# Patient Record
Sex: MALE | Race: WHITE
[De-identification: names, ages, dates, MRNs, and addresses within clinical notes are randomized per-mention and may not be internally consistent; named-entity substitution may affect disease eponyms.]

---

## 2018-02-25 ENCOUNTER — HOSPITAL ENCOUNTER (INPATIENT)
Dept: HOSPITAL 100 - ED | Age: 68
LOS: 1 days | Discharge: HOME | DRG: 343 | End: 2018-02-26
Payer: MEDICARE

## 2018-02-25 VITALS — BODY MASS INDEX: 29.1 KG/M2 | BODY MASS INDEX: 28 KG/M2 | BODY MASS INDEX: 29.2 KG/M2

## 2018-02-25 VITALS
HEART RATE: 120 BPM | RESPIRATION RATE: 18 BRPM | OXYGEN SATURATION: 97 % | SYSTOLIC BLOOD PRESSURE: 138 MMHG | TEMPERATURE: 98.42 F | DIASTOLIC BLOOD PRESSURE: 96 MMHG

## 2018-02-25 DIAGNOSIS — E11.9: ICD-10-CM

## 2018-02-25 DIAGNOSIS — N40.0: ICD-10-CM

## 2018-02-25 DIAGNOSIS — Z79.84: ICD-10-CM

## 2018-02-25 DIAGNOSIS — K35.80: Primary | ICD-10-CM

## 2018-02-25 LAB
ALANINE AMINOTRANSFER ALT/SGPT: 25 U/L (ref 16–61)
ALBUMIN SERPL-MCNC: 3.6 G/DL (ref 3.2–5)
ALKALINE PHOSPHATASE: 69 U/L (ref 45–117)
ANION GAP: 8 (ref 5–15)
AST(SGOT): 14 U/L (ref 15–37)
BILIRUB DIRECT SERPL-MCNC: 0.1 MG/DL (ref 0–0.3)
BUN SERPL-MCNC: 21 MG/DL (ref 7–18)
BUN/CREAT RATIO: 10.7 RATIO (ref 10–20)
CALCIUM SERPL-MCNC: 8.7 MG/DL (ref 8.5–10.1)
CARBON DIOXIDE: 30 MMOL/L (ref 21–32)
CHLORIDE: 103 MMOL/L (ref 98–107)
DEPRECATED RDW RBC: 43 FL (ref 35.1–43.9)
DIFFERENTIAL INDICATED: (no result)
ERYTHROCYTE [DISTWIDTH] IN BLOOD: 13.1 % (ref 11.6–14.6)
EST GLOM FILT RATE - AFR AMER: 44 ML/MIN (ref 60–?)
ESTIMATED CREATININE CLEARANCE: 39.82 ML/MIN
GLOBULIN: 2.8 G/DL (ref 2.2–4.2)
GLUCOSE, DIPSTICK: 1000 MG/DL
GLUCOSE: 187 MG/DL (ref 74–106)
HCT VFR BLD AUTO: 46.5 % (ref 40–54)
HEMOGLOBIN: 16.4 G/DL (ref 13–16.5)
HGB BLD-MCNC: 16.4 G/DL (ref 13–16.5)
IMMATURE GRANULOCYTES COUNT: 0.02 X10^3/UL (ref 0–0)
LIPASE: 232 U/L (ref 73–393)
MCV RBC: 91.7 FL (ref 80–94)
MEAN CORP HGB CONC: 35.3 G/GL (ref 32–36)
MEAN PLATELET VOL.: 10.5 FL (ref 6.2–12)
MUCOUS THREADS URNS QL MICRO: (no result) /HPF
PLATELET # BLD: 148 K/MM3 (ref 150–450)
PLATELET COUNT: 148 K/MM3 (ref 150–450)
POSITIVE COUNT: NO
POSITIVE DIFFERENTIAL: NO
POSITIVE MORPHOLOGY: NO
POTASSIUM: 4 MMOL/L (ref 3.5–5.1)
RBC # BLD AUTO: 5.07 M/MM3 (ref 4.6–6.2)
RBC DISTRIBUTION WIDTH CV: 13.1 % (ref 11.6–14.6)
RBC DISTRIBUTION WIDTH SD: 43 FL (ref 35.1–43.9)
RBC UR QL: (no result) /HPF (ref 0–5)
SP GR UR: 1.01 (ref 1–1.03)
SQUAMOUS URNS QL MICRO: (no result) /HPF (ref 0–5)
URINE PRESERVATIVE: (no result)
WBC # BLD AUTO: 10.1 K/MM3 (ref 4.4–11)
WHITE BLOOD COUNT: 10.1 K/MM3 (ref 4.4–11)

## 2018-02-25 PROCEDURE — 99282 EMERGENCY DEPT VISIT SF MDM: CPT

## 2018-02-25 PROCEDURE — 74176 CT ABD & PELVIS W/O CONTRAST: CPT

## 2018-02-25 PROCEDURE — 97802 MEDICAL NUTRITION INDIV IN: CPT

## 2018-02-25 PROCEDURE — 88304 TISSUE EXAM BY PATHOLOGIST: CPT

## 2018-02-25 PROCEDURE — 81001 URINALYSIS AUTO W/SCOPE: CPT

## 2018-02-25 PROCEDURE — A4216 STERILE WATER/SALINE, 10 ML: HCPCS

## 2018-02-25 PROCEDURE — 80076 HEPATIC FUNCTION PANEL: CPT

## 2018-02-25 PROCEDURE — 82962 GLUCOSE BLOOD TEST: CPT

## 2018-02-25 PROCEDURE — 83690 ASSAY OF LIPASE: CPT

## 2018-02-25 PROCEDURE — 93005 ELECTROCARDIOGRAM TRACING: CPT

## 2018-02-25 PROCEDURE — 85025 COMPLETE CBC W/AUTO DIFF WBC: CPT

## 2018-02-25 PROCEDURE — 80048 BASIC METABOLIC PNL TOTAL CA: CPT

## 2018-02-25 RX ADMIN — SODIUM CHLORIDE 125 ML: 9 INJECTION, SOLUTION INTRAVENOUS at 22:01

## 2018-02-26 VITALS
HEART RATE: 92 BPM | SYSTOLIC BLOOD PRESSURE: 121 MMHG | RESPIRATION RATE: 16 BRPM | OXYGEN SATURATION: 94 % | DIASTOLIC BLOOD PRESSURE: 71 MMHG

## 2018-02-26 VITALS
RESPIRATION RATE: 16 BRPM | DIASTOLIC BLOOD PRESSURE: 66 MMHG | SYSTOLIC BLOOD PRESSURE: 106 MMHG | OXYGEN SATURATION: 94 % | TEMPERATURE: 98.6 F | HEART RATE: 108 BPM

## 2018-02-26 VITALS — OXYGEN SATURATION: 94 %

## 2018-02-26 VITALS
SYSTOLIC BLOOD PRESSURE: 104 MMHG | OXYGEN SATURATION: 94 % | RESPIRATION RATE: 16 BRPM | TEMPERATURE: 98.06 F | DIASTOLIC BLOOD PRESSURE: 62 MMHG | HEART RATE: 100 BPM

## 2018-02-26 VITALS
OXYGEN SATURATION: 99 % | HEART RATE: 104 BPM | TEMPERATURE: 97.52 F | RESPIRATION RATE: 16 BRPM | SYSTOLIC BLOOD PRESSURE: 121 MMHG | DIASTOLIC BLOOD PRESSURE: 71 MMHG

## 2018-02-26 VITALS
SYSTOLIC BLOOD PRESSURE: 121 MMHG | DIASTOLIC BLOOD PRESSURE: 67 MMHG | HEART RATE: 98 BPM | OXYGEN SATURATION: 96 % | RESPIRATION RATE: 16 BRPM

## 2018-02-26 VITALS
DIASTOLIC BLOOD PRESSURE: 75 MMHG | HEART RATE: 90 BPM | RESPIRATION RATE: 16 BRPM | SYSTOLIC BLOOD PRESSURE: 121 MMHG | OXYGEN SATURATION: 96 %

## 2018-02-26 VITALS
OXYGEN SATURATION: 96 % | TEMPERATURE: 98.24 F | DIASTOLIC BLOOD PRESSURE: 71 MMHG | RESPIRATION RATE: 14 BRPM | SYSTOLIC BLOOD PRESSURE: 121 MMHG | HEART RATE: 90 BPM

## 2018-02-26 VITALS
TEMPERATURE: 99.32 F | HEART RATE: 103 BPM | RESPIRATION RATE: 18 BRPM | OXYGEN SATURATION: 94 % | SYSTOLIC BLOOD PRESSURE: 107 MMHG | DIASTOLIC BLOOD PRESSURE: 68 MMHG

## 2018-02-26 VITALS
OXYGEN SATURATION: 94 % | HEART RATE: 91 BPM | DIASTOLIC BLOOD PRESSURE: 60 MMHG | RESPIRATION RATE: 12 BRPM | SYSTOLIC BLOOD PRESSURE: 107 MMHG | TEMPERATURE: 97.5 F

## 2018-02-26 VITALS
TEMPERATURE: 97.3 F | SYSTOLIC BLOOD PRESSURE: 120 MMHG | RESPIRATION RATE: 16 BRPM | HEART RATE: 91 BPM | OXYGEN SATURATION: 92 % | DIASTOLIC BLOOD PRESSURE: 69 MMHG

## 2018-02-26 VITALS — TEMPERATURE: 98 F | HEART RATE: 88 BPM | RESPIRATION RATE: 16 BRPM | OXYGEN SATURATION: 96 %

## 2018-02-26 VITALS — DIASTOLIC BLOOD PRESSURE: 71 MMHG | SYSTOLIC BLOOD PRESSURE: 121 MMHG

## 2018-02-26 VITALS
RESPIRATION RATE: 16 BRPM | OXYGEN SATURATION: 96 % | SYSTOLIC BLOOD PRESSURE: 121 MMHG | DIASTOLIC BLOOD PRESSURE: 71 MMHG | HEART RATE: 96 BPM

## 2018-02-26 PROCEDURE — 0DTJ4ZZ RESECTION OF APPENDIX, PERCUTANEOUS ENDOSCOPIC APPROACH: ICD-10-PCS | Performed by: SURGERY

## 2018-02-26 RX ADMIN — SODIUM CHLORIDE 125 ML: 9 INJECTION, SOLUTION INTRAVENOUS at 05:23

## 2018-02-26 RX ADMIN — BUPIVACAINE HYDROCHLORIDE AND EPINEPHRINE BITARTRATE 30 ML: 5; .005 INJECTION, SOLUTION EPIDURAL; INTRACAUDAL; PERINEURAL at 04:35

## 2024-09-09 ENCOUNTER — APPOINTMENT (OUTPATIENT)
Dept: CARDIOLOGY | Facility: HOSPITAL | Age: 74
DRG: 322 | End: 2024-09-09
Payer: MEDICARE

## 2024-09-09 ENCOUNTER — APPOINTMENT (OUTPATIENT)
Dept: RADIOLOGY | Facility: HOSPITAL | Age: 74
DRG: 322 | End: 2024-09-09
Payer: MEDICARE

## 2024-09-09 ENCOUNTER — HOSPITAL ENCOUNTER (INPATIENT)
Facility: HOSPITAL | Age: 74
LOS: 1 days | Discharge: HOME | DRG: 322 | End: 2024-09-11
Attending: EMERGENCY MEDICINE | Admitting: HOSPITALIST
Payer: MEDICARE

## 2024-09-09 DIAGNOSIS — I21.4 NON-ST ELEVATION (NSTEMI) MYOCARDIAL INFARCTION (MULTI): ICD-10-CM

## 2024-09-09 DIAGNOSIS — I25.10 CORONARY ARTERY DISEASE INVOLVING NATIVE CORONARY ARTERY OF NATIVE HEART, UNSPECIFIED WHETHER ANGINA PRESENT: ICD-10-CM

## 2024-09-09 DIAGNOSIS — R79.89 ELEVATED TROPONIN: ICD-10-CM

## 2024-09-09 DIAGNOSIS — R07.9 CHEST PAIN, UNSPECIFIED TYPE: Primary | ICD-10-CM

## 2024-09-09 DIAGNOSIS — R07.89 OTHER CHEST PAIN: ICD-10-CM

## 2024-09-09 DIAGNOSIS — I20.89 EXERTIONAL ANGINA (CMS-HCC): ICD-10-CM

## 2024-09-09 LAB
ALBUMIN SERPL BCP-MCNC: 4.1 G/DL (ref 3.4–5)
ALP SERPL-CCNC: 57 U/L (ref 33–136)
ALT SERPL W P-5'-P-CCNC: 17 U/L (ref 10–52)
ANION GAP SERPL CALC-SCNC: 10 MMOL/L (ref 10–20)
ANION GAP SERPL CALC-SCNC: 12 MMOL/L (ref 10–20)
APTT PPP: 31 SECONDS (ref 27–38)
AST SERPL W P-5'-P-CCNC: 17 U/L (ref 9–39)
BASOPHILS # BLD AUTO: 0.05 X10*3/UL (ref 0–0.1)
BASOPHILS NFR BLD AUTO: 0.7 %
BILIRUB SERPL-MCNC: 0.5 MG/DL (ref 0–1.2)
BNP SERPL-MCNC: 54 PG/ML (ref 0–99)
BUN SERPL-MCNC: 16 MG/DL (ref 6–23)
BUN SERPL-MCNC: 16 MG/DL (ref 6–23)
CALCIUM SERPL-MCNC: 9.2 MG/DL (ref 8.6–10.3)
CALCIUM SERPL-MCNC: 9.2 MG/DL (ref 8.6–10.3)
CARDIAC TROPONIN I PNL SERPL HS: 54 NG/L (ref 0–20)
CARDIAC TROPONIN I PNL SERPL HS: 65 NG/L (ref 0–20)
CHLORIDE SERPL-SCNC: 102 MMOL/L (ref 98–107)
CHLORIDE SERPL-SCNC: 106 MMOL/L (ref 98–107)
CHOLEST SERPL-MCNC: 149 MG/DL (ref 0–199)
CHOLESTEROL/HDL RATIO: 5
CO2 SERPL-SCNC: 26 MMOL/L (ref 21–32)
CO2 SERPL-SCNC: 26 MMOL/L (ref 21–32)
CREAT SERPL-MCNC: 1.09 MG/DL (ref 0.5–1.3)
CREAT SERPL-MCNC: 1.19 MG/DL (ref 0.5–1.3)
D DIMER PPP FEU-MCNC: 418 NG/ML FEU
EGFRCR SERPLBLD CKD-EPI 2021: 65 ML/MIN/1.73M*2
EGFRCR SERPLBLD CKD-EPI 2021: 72 ML/MIN/1.73M*2
EOSINOPHIL # BLD AUTO: 0.3 X10*3/UL (ref 0–0.4)
EOSINOPHIL NFR BLD AUTO: 4.4 %
ERYTHROCYTE [DISTWIDTH] IN BLOOD BY AUTOMATED COUNT: 12.1 % (ref 11.5–14.5)
GLUCOSE BLD MANUAL STRIP-MCNC: 226 MG/DL (ref 74–99)
GLUCOSE SERPL-MCNC: 194 MG/DL (ref 74–99)
GLUCOSE SERPL-MCNC: 258 MG/DL (ref 74–99)
HCT VFR BLD AUTO: 49.2 % (ref 41–52)
HDLC SERPL-MCNC: 30 MG/DL
HGB BLD-MCNC: 16.7 G/DL (ref 13.5–17.5)
HOLD SPECIMEN: NORMAL
IMM GRANULOCYTES # BLD AUTO: 0.01 X10*3/UL (ref 0–0.5)
IMM GRANULOCYTES NFR BLD AUTO: 0.1 % (ref 0–0.9)
INR PPP: 0.9 (ref 0.9–1.1)
LDLC SERPL CALC-MCNC: 59 MG/DL
LYMPHOCYTES # BLD AUTO: 2 X10*3/UL (ref 0.8–3)
LYMPHOCYTES NFR BLD AUTO: 29.2 %
MAGNESIUM SERPL-MCNC: 1.93 MG/DL (ref 1.6–2.4)
MCH RBC QN AUTO: 31.3 PG (ref 26–34)
MCHC RBC AUTO-ENTMCNC: 33.9 G/DL (ref 32–36)
MCV RBC AUTO: 92 FL (ref 80–100)
MONOCYTES # BLD AUTO: 0.5 X10*3/UL (ref 0.05–0.8)
MONOCYTES NFR BLD AUTO: 7.3 %
NEUTROPHILS # BLD AUTO: 3.98 X10*3/UL (ref 1.6–5.5)
NEUTROPHILS NFR BLD AUTO: 58.3 %
NON HDL CHOLESTEROL: 119 MG/DL (ref 0–149)
NRBC BLD-RTO: 0 /100 WBCS (ref 0–0)
PLATELET # BLD AUTO: 190 X10*3/UL (ref 150–450)
POTASSIUM SERPL-SCNC: 4.4 MMOL/L (ref 3.5–5.3)
POTASSIUM SERPL-SCNC: 4.6 MMOL/L (ref 3.5–5.3)
PROT SERPL-MCNC: 6.1 G/DL (ref 6.4–8.2)
PROTHROMBIN TIME: 10.7 SECONDS (ref 9.8–12.8)
RBC # BLD AUTO: 5.33 X10*6/UL (ref 4.5–5.9)
SODIUM SERPL-SCNC: 135 MMOL/L (ref 136–145)
SODIUM SERPL-SCNC: 138 MMOL/L (ref 136–145)
T4 FREE SERPL-MCNC: 1.41 NG/DL (ref 0.61–1.12)
TRIGL SERPL-MCNC: 301 MG/DL (ref 0–149)
TSH SERPL-ACNC: 0.22 MIU/L (ref 0.44–3.98)
VLDL: 60 MG/DL (ref 0–40)
WBC # BLD AUTO: 6.8 X10*3/UL (ref 4.4–11.3)

## 2024-09-09 PROCEDURE — 84443 ASSAY THYROID STIM HORMONE: CPT | Performed by: NURSE PRACTITIONER

## 2024-09-09 PROCEDURE — 85730 THROMBOPLASTIN TIME PARTIAL: CPT | Performed by: PHYSICIAN ASSISTANT

## 2024-09-09 PROCEDURE — 93005 ELECTROCARDIOGRAM TRACING: CPT

## 2024-09-09 PROCEDURE — G0378 HOSPITAL OBSERVATION PER HR: HCPCS

## 2024-09-09 PROCEDURE — 82947 ASSAY GLUCOSE BLOOD QUANT: CPT

## 2024-09-09 PROCEDURE — 2500000001 HC RX 250 WO HCPCS SELF ADMINISTERED DRUGS (ALT 637 FOR MEDICARE OP): Performed by: PHYSICIAN ASSISTANT

## 2024-09-09 PROCEDURE — 84484 ASSAY OF TROPONIN QUANT: CPT | Performed by: EMERGENCY MEDICINE

## 2024-09-09 PROCEDURE — 36415 COLL VENOUS BLD VENIPUNCTURE: CPT | Performed by: NURSE PRACTITIONER

## 2024-09-09 PROCEDURE — 2500000004 HC RX 250 GENERAL PHARMACY W/ HCPCS (ALT 636 FOR OP/ED): Performed by: NURSE PRACTITIONER

## 2024-09-09 PROCEDURE — 99285 EMERGENCY DEPT VISIT HI MDM: CPT

## 2024-09-09 PROCEDURE — 83880 ASSAY OF NATRIURETIC PEPTIDE: CPT | Performed by: PHYSICIAN ASSISTANT

## 2024-09-09 PROCEDURE — 84439 ASSAY OF FREE THYROXINE: CPT | Performed by: NURSE PRACTITIONER

## 2024-09-09 PROCEDURE — 71045 X-RAY EXAM CHEST 1 VIEW: CPT | Performed by: RADIOLOGY

## 2024-09-09 PROCEDURE — 2500000002 HC RX 250 W HCPCS SELF ADMINISTERED DRUGS (ALT 637 FOR MEDICARE OP, ALT 636 FOR OP/ED): Performed by: INTERNAL MEDICINE

## 2024-09-09 PROCEDURE — 80061 LIPID PANEL: CPT | Performed by: NURSE PRACTITIONER

## 2024-09-09 PROCEDURE — 2500000001 HC RX 250 WO HCPCS SELF ADMINISTERED DRUGS (ALT 637 FOR MEDICARE OP): Performed by: NURSE PRACTITIONER

## 2024-09-09 PROCEDURE — 99291 CRITICAL CARE FIRST HOUR: CPT | Performed by: STUDENT IN AN ORGANIZED HEALTH CARE EDUCATION/TRAINING PROGRAM

## 2024-09-09 PROCEDURE — 2500000001 HC RX 250 WO HCPCS SELF ADMINISTERED DRUGS (ALT 637 FOR MEDICARE OP): Performed by: STUDENT IN AN ORGANIZED HEALTH CARE EDUCATION/TRAINING PROGRAM

## 2024-09-09 PROCEDURE — 83735 ASSAY OF MAGNESIUM: CPT | Performed by: PHYSICIAN ASSISTANT

## 2024-09-09 PROCEDURE — 85025 COMPLETE CBC W/AUTO DIFF WBC: CPT | Performed by: EMERGENCY MEDICINE

## 2024-09-09 PROCEDURE — 85610 PROTHROMBIN TIME: CPT | Performed by: PHYSICIAN ASSISTANT

## 2024-09-09 PROCEDURE — 36415 COLL VENOUS BLD VENIPUNCTURE: CPT | Performed by: EMERGENCY MEDICINE

## 2024-09-09 PROCEDURE — 71045 X-RAY EXAM CHEST 1 VIEW: CPT

## 2024-09-09 PROCEDURE — 96372 THER/PROPH/DIAG INJ SC/IM: CPT | Performed by: NURSE PRACTITIONER

## 2024-09-09 PROCEDURE — 85379 FIBRIN DEGRADATION QUANT: CPT | Performed by: PHYSICIAN ASSISTANT

## 2024-09-09 PROCEDURE — 84075 ASSAY ALKALINE PHOSPHATASE: CPT | Performed by: EMERGENCY MEDICINE

## 2024-09-09 PROCEDURE — 82374 ASSAY BLOOD CARBON DIOXIDE: CPT | Performed by: NURSE PRACTITIONER

## 2024-09-09 PROCEDURE — 2500000002 HC RX 250 W HCPCS SELF ADMINISTERED DRUGS (ALT 637 FOR MEDICARE OP, ALT 636 FOR OP/ED): Performed by: NURSE PRACTITIONER

## 2024-09-09 RX ORDER — ACETAMINOPHEN 325 MG/1
650 TABLET ORAL EVERY 4 HOURS PRN
Status: DISCONTINUED | OUTPATIENT
Start: 2024-09-09 | End: 2024-09-11 | Stop reason: HOSPADM

## 2024-09-09 RX ORDER — GLIPIZIDE 5 MG/1
5 TABLET ORAL
Status: DISCONTINUED | OUTPATIENT
Start: 2024-09-10 | End: 2024-09-11 | Stop reason: HOSPADM

## 2024-09-09 RX ORDER — DEXTROSE 50 % IN WATER (D50W) INTRAVENOUS SYRINGE
12.5
Status: DISCONTINUED | OUTPATIENT
Start: 2024-09-09 | End: 2024-09-11 | Stop reason: HOSPADM

## 2024-09-09 RX ORDER — DEXTROSE 50 % IN WATER (D50W) INTRAVENOUS SYRINGE
25
Status: DISCONTINUED | OUTPATIENT
Start: 2024-09-09 | End: 2024-09-11 | Stop reason: HOSPADM

## 2024-09-09 RX ORDER — MULTIVIT WITH IRON,MINERALS
1 TABLET,CHEWABLE ORAL EVERY MORNING
Status: ON HOLD | COMMUNITY

## 2024-09-09 RX ORDER — LEVOTHYROXINE SODIUM 112 UG/1
112 TABLET ORAL DAILY
Status: ON HOLD | COMMUNITY

## 2024-09-09 RX ORDER — HEPARIN SODIUM 10000 [USP'U]/100ML
0-4000 INJECTION, SOLUTION INTRAVENOUS CONTINUOUS
Status: DISCONTINUED | OUTPATIENT
Start: 2024-09-09 | End: 2024-09-09

## 2024-09-09 RX ORDER — LEVOTHYROXINE SODIUM 112 UG/1
112 TABLET ORAL DAILY
Status: DISCONTINUED | OUTPATIENT
Start: 2024-09-10 | End: 2024-09-11 | Stop reason: HOSPADM

## 2024-09-09 RX ORDER — TRIAMCINOLONE ACETONIDE 1 MG/G
CREAM TOPICAL DAILY PRN
Status: ON HOLD | COMMUNITY

## 2024-09-09 RX ORDER — ONDANSETRON 4 MG/1
4 TABLET, ORALLY DISINTEGRATING ORAL EVERY 8 HOURS PRN
Status: DISCONTINUED | OUTPATIENT
Start: 2024-09-09 | End: 2024-09-11 | Stop reason: HOSPADM

## 2024-09-09 RX ORDER — INSULIN LISPRO 100 [IU]/ML
0-10 INJECTION, SOLUTION INTRAVENOUS; SUBCUTANEOUS
Status: DISCONTINUED | OUTPATIENT
Start: 2024-09-09 | End: 2024-09-11 | Stop reason: HOSPADM

## 2024-09-09 RX ORDER — SITAGLIPTIN AND METFORMIN HYDROCHLORIDE 1000; 50 MG/1; MG/1
1 TABLET, FILM COATED ORAL
Status: ON HOLD | COMMUNITY

## 2024-09-09 RX ORDER — TAMSULOSIN HYDROCHLORIDE 0.4 MG/1
0.4 CAPSULE ORAL NIGHTLY
Status: DISCONTINUED | OUTPATIENT
Start: 2024-09-09 | End: 2024-09-11 | Stop reason: HOSPADM

## 2024-09-09 RX ORDER — SIMVASTATIN 20 MG/1
20 TABLET, FILM COATED ORAL NIGHTLY
COMMUNITY
End: 2024-09-11 | Stop reason: HOSPADM

## 2024-09-09 RX ORDER — PANTOPRAZOLE SODIUM 40 MG/1
40 TABLET, DELAYED RELEASE ORAL
Status: DISCONTINUED | OUTPATIENT
Start: 2024-09-10 | End: 2024-09-11 | Stop reason: HOSPADM

## 2024-09-09 RX ORDER — PHENYLEPHRINE HCL 10 MG
1000 TABLET ORAL 2 TIMES DAILY
Status: ON HOLD | COMMUNITY

## 2024-09-09 RX ORDER — NAPROXEN SODIUM 220 MG/1
1 TABLET ORAL DAILY
Status: ON HOLD | COMMUNITY

## 2024-09-09 RX ORDER — NAPROXEN SODIUM 220 MG/1
324 TABLET, FILM COATED ORAL ONCE
Status: COMPLETED | OUTPATIENT
Start: 2024-09-09 | End: 2024-09-09

## 2024-09-09 RX ORDER — ASPIRIN 81 MG/1
81 TABLET ORAL DAILY
Status: DISCONTINUED | OUTPATIENT
Start: 2024-09-10 | End: 2024-09-11 | Stop reason: HOSPADM

## 2024-09-09 RX ORDER — ONDANSETRON HYDROCHLORIDE 2 MG/ML
4 INJECTION, SOLUTION INTRAVENOUS EVERY 8 HOURS PRN
Status: DISCONTINUED | OUTPATIENT
Start: 2024-09-09 | End: 2024-09-11 | Stop reason: HOSPADM

## 2024-09-09 RX ORDER — ENOXAPARIN SODIUM 100 MG/ML
40 INJECTION SUBCUTANEOUS EVERY 24 HOURS
Status: DISCONTINUED | OUTPATIENT
Start: 2024-09-09 | End: 2024-09-11 | Stop reason: HOSPADM

## 2024-09-09 RX ORDER — LISINOPRIL 5 MG/1
5 TABLET ORAL DAILY
Status: ON HOLD | COMMUNITY

## 2024-09-09 RX ORDER — BISMUTH SUBSALICYLATE 262 MG
1 TABLET,CHEWABLE ORAL DAILY
Status: ON HOLD | COMMUNITY

## 2024-09-09 RX ORDER — MULTIVITAMIN/IRON/FOLIC ACID 18MG-0.4MG
1 TABLET ORAL DAILY
Status: ON HOLD | COMMUNITY

## 2024-09-09 RX ORDER — METOPROLOL SUCCINATE 25 MG/1
25 TABLET, EXTENDED RELEASE ORAL DAILY
Status: DISCONTINUED | OUTPATIENT
Start: 2024-09-09 | End: 2024-09-11 | Stop reason: HOSPADM

## 2024-09-09 RX ORDER — POLYETHYLENE GLYCOL 3350 17 G/17G
17 POWDER, FOR SOLUTION ORAL DAILY
Status: DISCONTINUED | OUTPATIENT
Start: 2024-09-09 | End: 2024-09-11 | Stop reason: HOSPADM

## 2024-09-09 RX ORDER — TAMSULOSIN HYDROCHLORIDE 0.4 MG/1
0.4 CAPSULE ORAL DAILY
Status: ON HOLD | COMMUNITY

## 2024-09-09 RX ORDER — TESTOSTERONE CYPIONATE 200 MG/ML
200 INJECTION, SOLUTION INTRAMUSCULAR
Status: ON HOLD | COMMUNITY

## 2024-09-09 RX ORDER — ATORVASTATIN CALCIUM 40 MG/1
40 TABLET, FILM COATED ORAL NIGHTLY
Status: DISCONTINUED | OUTPATIENT
Start: 2024-09-09 | End: 2024-09-11 | Stop reason: HOSPADM

## 2024-09-09 RX ORDER — OMEPRAZOLE 40 MG/1
40 CAPSULE, DELAYED RELEASE ORAL
Status: ON HOLD | COMMUNITY

## 2024-09-09 RX ORDER — GLIPIZIDE 5 MG/1
5 TABLET ORAL
Status: ON HOLD | COMMUNITY

## 2024-09-09 RX ORDER — LISINOPRIL 5 MG/1
5 TABLET ORAL NIGHTLY
Status: DISCONTINUED | OUTPATIENT
Start: 2024-09-09 | End: 2024-09-11 | Stop reason: HOSPADM

## 2024-09-09 RX ORDER — ASPIRIN 81 MG/1
81 TABLET ORAL DAILY
Status: ON HOLD | COMMUNITY

## 2024-09-09 RX ORDER — GLUCOSAMINE/CHONDR SU A SOD 167-133 MG
500 CAPSULE ORAL DAILY
COMMUNITY
End: 2024-09-11 | Stop reason: HOSPADM

## 2024-09-09 RX ORDER — SIMVASTATIN 20 MG/1
20 TABLET, FILM COATED ORAL NIGHTLY
Status: DISCONTINUED | OUTPATIENT
Start: 2024-09-09 | End: 2024-09-09

## 2024-09-09 RX ORDER — IBUPROFEN 100 MG/5ML
1000 SUSPENSION, ORAL (FINAL DOSE FORM) ORAL DAILY
Status: ON HOLD | COMMUNITY

## 2024-09-09 ASSESSMENT — COGNITIVE AND FUNCTIONAL STATUS - GENERAL
DAILY ACTIVITIY SCORE: 24
CLIMB 3 TO 5 STEPS WITH RAILING: A LITTLE
MOBILITY SCORE: 23
PATIENT BASELINE BEDBOUND: NO

## 2024-09-09 ASSESSMENT — HEART SCORE
ECG: NORMAL
HEART SCORE: 6
AGE: 65+
RISK FACTORS: >2 RISK FACTORS OR HX OF ATHEROSCLEROTIC DISEASE
TROPONIN: 1-3 TIMES NORMAL LIMIT
HISTORY: MODERATELY SUSPICIOUS

## 2024-09-09 ASSESSMENT — ENCOUNTER SYMPTOMS
BACK PAIN: 0
ABDOMINAL PAIN: 0
FEVER: 0
SEIZURES: 0
PALPITATIONS: 0
ARTHRALGIAS: 0
VOMITING: 0
HEMATURIA: 0
COLOR CHANGE: 0
EYE PAIN: 0
SORE THROAT: 0
SHORTNESS OF BREATH: 0
CHILLS: 0
COUGH: 0
DYSURIA: 0

## 2024-09-09 ASSESSMENT — ACTIVITIES OF DAILY LIVING (ADL)
JUDGMENT_ADEQUATE_SAFELY_COMPLETE_DAILY_ACTIVITIES: YES
HEARING - RIGHT EAR: HEARING AID
HEARING - LEFT EAR: HEARING AID
ADEQUATE_TO_COMPLETE_ADL: YES
BATHING: INDEPENDENT
WALKS IN HOME: INDEPENDENT
LACK_OF_TRANSPORTATION: NO
FEEDING YOURSELF: INDEPENDENT
GROOMING: INDEPENDENT
PATIENT'S MEMORY ADEQUATE TO SAFELY COMPLETE DAILY ACTIVITIES?: YES
DRESSING YOURSELF: INDEPENDENT
TOILETING: INDEPENDENT

## 2024-09-09 ASSESSMENT — COLUMBIA-SUICIDE SEVERITY RATING SCALE - C-SSRS
6. HAVE YOU EVER DONE ANYTHING, STARTED TO DO ANYTHING, OR PREPARED TO DO ANYTHING TO END YOUR LIFE?: NO
1. IN THE PAST MONTH, HAVE YOU WISHED YOU WERE DEAD OR WISHED YOU COULD GO TO SLEEP AND NOT WAKE UP?: NO
2. HAVE YOU ACTUALLY HAD ANY THOUGHTS OF KILLING YOURSELF?: NO

## 2024-09-09 ASSESSMENT — PAIN SCALES - GENERAL
PAINLEVEL_OUTOF10: 0 - NO PAIN
PAINLEVEL_OUTOF10: 3
PAINLEVEL_OUTOF10: 0 - NO PAIN
PAINLEVEL_OUTOF10: 3

## 2024-09-09 ASSESSMENT — PAIN - FUNCTIONAL ASSESSMENT
PAIN_FUNCTIONAL_ASSESSMENT: 0-10

## 2024-09-09 ASSESSMENT — PAIN DESCRIPTION - LOCATION: LOCATION: CHEST

## 2024-09-09 ASSESSMENT — PAIN DESCRIPTION - ORIENTATION: ORIENTATION: LEFT

## 2024-09-09 ASSESSMENT — PAIN DESCRIPTION - DESCRIPTORS: DESCRIPTORS: TIGHTNESS

## 2024-09-09 NOTE — ED PROVIDER NOTES
Patient is a 74-year-old male who presents to the emergency room with a chief complaint of chest pain.  Patient states that he has had chest pain mainly to his left side for the past week.  He states that this chest pain is present with exertion.  He states that the chest pain was specifically bad on Friday although still comes and goes when he is exerting himself.  He states that when he was walking from the parking lot into the hospital he developed chest pain that went away with rest.  At times he has associated shortness of breath.  He denies any fever or chills.  No cough.  He does have a history of hypertension, hypercholesterolemia, diabetes mellitus, and a family history of coronary artery disease.  Patient states that he did have a stress test approximately 5 years ago.  He denies any history of heart stents or heart attacks.           Review of Systems   Constitutional:  Negative for chills and fever.   HENT:  Negative for ear pain and sore throat.    Eyes:  Negative for pain and visual disturbance.   Respiratory:  Negative for cough and shortness of breath.    Cardiovascular:  Positive for chest pain. Negative for palpitations.   Gastrointestinal:  Negative for abdominal pain and vomiting.   Genitourinary:  Negative for dysuria and hematuria.   Musculoskeletal:  Negative for arthralgias and back pain.   Skin:  Negative for color change and rash.   Neurological:  Negative for seizures and syncope.   All other systems reviewed and are negative.       Physical Exam  Vitals and nursing note reviewed.   Constitutional:       General: He is not in acute distress.     Appearance: He is well-developed.   HENT:      Head: Normocephalic and atraumatic.   Eyes:      Conjunctiva/sclera: Conjunctivae normal.   Cardiovascular:      Rate and Rhythm: Normal rate and regular rhythm.      Heart sounds: No murmur heard.  Pulmonary:      Effort: Pulmonary effort is normal. No respiratory distress.      Breath sounds: Normal  breath sounds. No decreased breath sounds, wheezing, rhonchi or rales.   Abdominal:      Palpations: Abdomen is soft.      Tenderness: There is no abdominal tenderness.   Musculoskeletal:         General: No swelling. Normal range of motion.      Cervical back: Normal range of motion and neck supple.      Right lower leg: No edema.      Left lower leg: No edema.   Skin:     General: Skin is warm and dry.      Capillary Refill: Capillary refill takes less than 2 seconds.   Neurological:      General: No focal deficit present.      Mental Status: He is alert.   Psychiatric:         Mood and Affect: Mood normal.          Labs Reviewed   COMPREHENSIVE METABOLIC PANEL - Abnormal       Result Value    Glucose 258 (*)     Sodium 135 (*)     Potassium 4.6      Chloride 102      Bicarbonate 26      Anion Gap 12      Urea Nitrogen 16      Creatinine 1.19      eGFR 65      Calcium 9.2      Albumin 4.1      Alkaline Phosphatase 57      Total Protein 6.1 (*)     AST 17      Bilirubin, Total 0.5      ALT 17     SERIAL TROPONIN-INITIAL - Abnormal    Troponin I, High Sensitivity 65 (*)     Narrative:     Less than 99th percentile of normal range cutoff-  Female and children under 18 years old <14 ng/L; Male <21 ng/L: Negative  Repeat testing should be performed if clinically indicated.     Female and children under 18 years old 14-50 ng/L; Male 21-50 ng/L:  Consistent with possible cardiac damage and possible increased clinical   risk. Serial measurements may help to assess extent of myocardial damage.     >50 ng/L: Consistent with cardiac damage, increased clinical risk and  myocardial infarction. Serial measurements may help assess extent of   myocardial damage.      NOTE: Children less than 1 year old may have higher baseline troponin   levels and results should be interpreted in conjunction with the overall   clinical context.     NOTE: Troponin I testing is performed using a different   testing methodology at [facility] than at other   [facility] hospitals. Direct result comparisons should only   be made within the same method.   SERIAL TROPONIN, 1 HOUR - Abnormal    Troponin I, High Sensitivity 54 (*)     Narrative:     Less than 99th percentile of normal range cutoff-  Female and children under 18 years old <14 ng/L; Male <21 ng/L: Negative  Repeat testing should be performed if clinically indicated.     Female and children under 18 years old 14-50 ng/L; Male 21-50 ng/L:  Consistent with possible cardiac damage and possible increased clinical   risk. Serial measurements may help to assess extent of myocardial damage.     >50 ng/L: Consistent with cardiac damage, increased clinical risk and  myocardial infarction. Serial measurements may help assess extent of   myocardial damage.      NOTE: Children less than 1 year old may have higher baseline troponin   levels and results should be interpreted in conjunction with the overall   clinical context.     NOTE: Troponin I testing is performed using a different   testing methodology at [facility] than at other   [facility]. Direct result comparisons should only   be made within the same method.   MAGNESIUM - Normal    Magnesium 1.93     APTT - Normal    aPTT 31      Narrative:     The APTT is no longer used for monitoring Unfractionated Heparin Therapy. For monitoring Heparin Therapy, use the Heparin Assay.   PROTIME-INR - Normal    Protime 10.7      INR 0.9     D-DIMER, VTE EXCLUSION - Normal    D-Dimer, Quantitative VTE Exclusion 418      Narrative:     The VTE Exclusion D-Dimer assay is reported in ng/mL Fibrinogen Equivalent Units (FEU).    Per 's instructions for use, a value of less than 500 ng/mL (FEU) may help to exclude DVT or PE in outpatients when the assay is used with a clinical pretest probability assessment.(AEMR must utilize and document eCalc 'Wells Score Deep Vein Thrombosis Risk' for DVT exclusion only. Emergency Department should  utilize  Guidelines for Emergency Department Use of the VTE Exclusion D-Dimer and Clinical Pretest probability assessment model for DVT or PE exclusion.)   B-TYPE NATRIURETIC PEPTIDE - Normal    BNP 54      Narrative:        <100 pg/mL - Heart failure unlikely  100-299 pg/mL - Intermediate probability of acute heart                  failure exacerbation. Correlate with clinical                  context and patient history.    >=300 pg/mL - Heart Failure likely. Correlate with clinical                  context and patient history.    BNP testing is performed using different testing methodology at [facility] than at other [facility]. Direct result comparisons should only be made within the same method.      CBC WITH AUTO DIFFERENTIAL    WBC 6.8      nRBC 0.0      RBC 5.33      Hemoglobin 16.7      Hematocrit 49.2      MCV 92      MCH 31.3      MCHC 33.9      RDW 12.1      Platelets 190      Neutrophils % 58.3      Immature Granulocytes %, Automated 0.1      Lymphocytes % 29.2      Monocytes % 7.3      Eosinophils % 4.4      Basophils % 0.7      Neutrophils Absolute 3.98      Immature Granulocytes Absolute, Automated 0.01      Lymphocytes Absolute 2.00      Monocytes Absolute 0.50      Eosinophils Absolute 0.30      Basophils Absolute 0.05     TROPONIN SERIES- (INITIAL, 1 HR)    Narrative:     The following orders were created for panel order Troponin Series, (0, 1 HR).  Procedure                               Abnormality         Status                     ---------                               -----------         ------                     Troponin I, High Sensiti...[[id number]]  Abnormal            Final result               Troponin, High Sensitivi...[[id number]]  Abnormal            Final result                 Please view results for these tests on the individual orders.        XR chest 1 view   Final Result   1.  No evidence of acute cardiopulmonary process.                  MACRO:   None         Signed by: Sunita Akinsdylan 9/9/2024 11:59 AM   Dictation workstation:   [id number]           ECG 12 lead    Performed by: Leana Rodriguez PA-C  Authorized by: Leana Rodriguez PA-C    ECG interpreted by ED Physician in the absence of a cardiologist: yes    Comments:      EKG shows sinus tachycardia with a rate of 104 bpm.  No ST elevation.  Incomplete right bundle branch block.  SD interval is 162 ms and QRS duration is 108 ms.EKG interpretation per myself, Leana Rodriguez       Medical Decision Making  HEART SCORE 6    Patient is a 74-year-old male, Gael Norris hx of HTN, Hyperlipidemia, DM, and family history who presents with chest pain for 1 week.  His pain is present with exertion and is alleviated with rest. Was essentially pain free when he arrived to the ED but states that he had pain while walking in that has since resolved. Initial troponin was 65, repeat 54. Labwork otherwise unremarkable. EKG showed sinus tachycardia. He was given aspirin and nitroglycerin. Disucssed with cardiologist, Dr. Dixon who will perform cardiac catheterization tomorrow. Did not recommend heparin at this time. Patient seen and evaluated by myself along with attending physician, Dr. Kaye    Amount and/or Complexity of Data Reviewed  Labs: ordered. Decision-making details documented in ED Course.  Radiology: ordered. Decision-making details documented in ED Course.     Details: 1 view chest x-rays shows no acute process    Risk  Decision regarding hospitalization.         Diagnoses as of 09/09/24 1331   Chest pain, unspecified type   Exertional angina (CMS-HCC)   Elevated troponin                    Leana Rodriguez PA-C  09/09/24 1331

## 2024-09-09 NOTE — ASSESSMENT & PLAN NOTE
-Telemetry  -Cardiac diet n.p.o. at midnight  -Cardiology consulted and cath scheduled for tomorrow  -CBC BMP in the morning    Chronic conditions    Diabetes mellitus  -Cardiac diet controlled diet  -A1c 7.4  -Sliding scale insulin Accu-Cheks    Hyperlipidemia  -Continue aspirin and statin    Hypertension  -Currently continue home meds    For thyroidism  -Sinew levothyroxine    DVT prophylaxis continue Lovenox SCDs frequent ambulation    Position Home 24 to 48 hours when medically stable

## 2024-09-09 NOTE — Clinical Note
Angioplasty of the middle left anterior descending lesion. Inflation 1: Pressure = 15 susan; Duration = 15 sec. Inflation 2: Pressure = 15 susan; Duration = 15 sec. Removed after inflations

## 2024-09-09 NOTE — Clinical Note
Angioplasty of the left anterior descending diagonal lesion. Inflation 1: Pressure = 8 susan; Duration = 10 sec. Inflation 2: Pressure = 8 susan; Duration = 8 sec. Removed after inflations

## 2024-09-09 NOTE — H&P
History Of Present Illness  Gael Burnette is a 74 y.o. male past medical history of diabetes mellitus, hypertension, hyperlipidemia hypothyroidism hypergonadism presented to the emergency department with chief complaint of chest pain.Initial troponin was 65, repeat 54. Labwork otherwise unremarkable. EKG showed sinus tachycardia. He was given aspirin and nitroglycerin.  Case was discussed with Dr. Cooper who will perform cardiac catheterization in the morning.    Patient currently chest pain-free patient's daughter who is an NP is at bedside.  Patient states that he has had chest pain mainly to his left side for the past week. He states that this chest pain is present with exertion.  No associated symptoms.     Past Medical History  No past medical history on file.    Surgical History  No past surgical history on file.     Social History  He has no history on file for tobacco use, alcohol use, and drug use.    Family History  No family history on file.     Allergies  Patient has no known allergies.    Review of Systems   Constitutional:  Negative for chills and fever.   HENT:  Negative for ear pain and sore throat.    Eyes:  Negative for pain and visual disturbance.   Respiratory:  Negative for cough and shortness of breath.    Cardiovascular:  Positive for chest pain. Negative for palpitations.   Gastrointestinal:  Negative for abdominal pain and vomiting.   Genitourinary:  Negative for dysuria and hematuria.   Musculoskeletal:  Negative for arthralgias and back pain.   Skin:  Negative for color change and rash.   Neurological:  Negative for seizures and syncope.   All other systems reviewed and are negative.  Physical Exam   Constitutional:       General: He is not in acute distress.     Appearance: He is well-developed.   HENT:      Head: Normocephalic and atraumatic.   Eyes:      Conjunctiva/sclera: Conjunctivae normal.   Cardiovascular:      Rate and Rhythm: Normal rate and regular rhythm.      Heart sounds:  "No murmur heard.  Pulmonary:      Effort: Pulmonary effort is normal. No respiratory distress.      Breath sounds: Normal breath sounds. No decreased breath sounds, wheezing, rhonchi or rales.   Abdominal:      Palpations: Abdomen is soft.      Tenderness: There is no abdominal tenderness.   Musculoskeletal:         General: No swelling. Normal range of motion.      Cervical back: Normal range of motion and neck supple.      Right lower leg: No edema.      Left lower leg: No edema.   Skin:     General: Skin is warm and dry.      Capillary Refill: Capillary refill takes less than 2 seconds.   Neurological:      General: No focal deficit present.      Mental Status: He is alert.   Psychiatric:         Mood and Affect: Mood normal.  Last Recorded Vitals  Blood pressure 113/68, pulse 86, temperature 36.4 °C (97.6 °F), resp. rate 17, height 1.803 m (5' 11\"), weight 88.5 kg (195 lb), SpO2 96%.    Relevant Results      Results for orders placed or performed during the hospital encounter of 09/09/24 (from the past 24 hour(s))   Light Blue Top   Result Value Ref Range    Extra Tube Hold for add-ons.    aPTT   Result Value Ref Range    aPTT 31 27 - 38 seconds   Protime-INR   Result Value Ref Range    Protime 10.7 9.8 - 12.8 seconds    INR 0.9 0.9 - 1.1   D-Dimer, VTE Exclusion   Result Value Ref Range    D-Dimer, Quantitative VTE Exclusion 418 <=500 ng/mL FEU   Comprehensive metabolic panel   Result Value Ref Range    Glucose 258 (H) 74 - 99 mg/dL    Sodium 135 (L) 136 - 145 mmol/L    Potassium 4.6 3.5 - 5.3 mmol/L    Chloride 102 98 - 107 mmol/L    Bicarbonate 26 21 - 32 mmol/L    Anion Gap 12 10 - 20 mmol/L    Urea Nitrogen 16 6 - 23 mg/dL    Creatinine 1.19 0.50 - 1.30 mg/dL    eGFR 65 >60 mL/min/1.73m*2    Calcium 9.2 8.6 - 10.3 mg/dL    Albumin 4.1 3.4 - 5.0 g/dL    Alkaline Phosphatase 57 33 - 136 U/L    Total Protein 6.1 (L) 6.4 - 8.2 g/dL    AST 17 9 - 39 U/L    Bilirubin, Total 0.5 0.0 - 1.2 mg/dL    ALT 17 10 - 52 " U/L   CBC and Auto Differential   Result Value Ref Range    WBC 6.8 4.4 - 11.3 x10*3/uL    nRBC 0.0 0.0 - 0.0 /100 WBCs    RBC 5.33 4.50 - 5.90 x10*6/uL    Hemoglobin 16.7 13.5 - 17.5 g/dL    Hematocrit 49.2 41.0 - 52.0 %    MCV 92 80 - 100 fL    MCH 31.3 26.0 - 34.0 pg    MCHC 33.9 32.0 - 36.0 g/dL    RDW 12.1 11.5 - 14.5 %    Platelets 190 150 - 450 x10*3/uL    Neutrophils % 58.3 40.0 - 80.0 %    Immature Granulocytes %, Automated 0.1 0.0 - 0.9 %    Lymphocytes % 29.2 13.0 - 44.0 %    Monocytes % 7.3 2.0 - 10.0 %    Eosinophils % 4.4 0.0 - 6.0 %    Basophils % 0.7 0.0 - 2.0 %    Neutrophils Absolute 3.98 1.60 - 5.50 x10*3/uL    Immature Granulocytes Absolute, Automated 0.01 0.00 - 0.50 x10*3/uL    Lymphocytes Absolute 2.00 0.80 - 3.00 x10*3/uL    Monocytes Absolute 0.50 0.05 - 0.80 x10*3/uL    Eosinophils Absolute 0.30 0.00 - 0.40 x10*3/uL    Basophils Absolute 0.05 0.00 - 0.10 x10*3/uL   Troponin I, High Sensitivity, Initial   Result Value Ref Range    Troponin I, High Sensitivity 65 (HH) 0 - 20 ng/L   Magnesium   Result Value Ref Range    Magnesium 1.93 1.60 - 2.40 mg/dL   B-type natriuretic peptide   Result Value Ref Range    BNP 54 0 - 99 pg/mL   Troponin, High Sensitivity, 1 Hour   Result Value Ref Range    Troponin I, High Sensitivity 54 (HH) 0 - 20 ng/L         Gael Burnette is a 74 y.o. male past medical history of diabetes mellitus, hypertension, hyperlipidemia hypothyroidism hypergonadism presented to the emergency department with chief complaint of chest pain.Initial troponin was 65, repeat 54. Labwork otherwise unremarkable. EKG showed sinus tachycardia. He was given aspirin and nitroglycerin.  Case was discussed with Dr. Cooper who will perform cardiac catheterization in the morning.       Assessment/Plan   Assessment & Plan  Chest pain  -Telemetry  -Cardiac diet n.p.o. at midnight  -Cardiology consulted and cath scheduled for tomorrow  -CBC BMP in the morning    Chronic conditions    Diabetes  mellitus  -Cardiac diet controlled diet  -A1c 7.4  -Sliding scale insulin Accu-Cheks    Hyperlipidemia  -Continue aspirin and statin    Hypertension  -Currently continue home meds    For thyroidism  -Sinew levothyroxine    DVT prophylaxis continue Lovenox SCDs frequent ambulation    Position Home 24 to 48 hours when medically stable        Rach Francisco, APRN-CNP

## 2024-09-09 NOTE — CARE PLAN
The patient's goals for the shift include  no chest pain     The clinical goals for the shift include not to worry and be happy    Over the shift, the patient did not make progress toward the following goals. Barriers to progression include   Problem: Pain  Goal: Takes deep breaths with improved pain control throughout the shift  Outcome: Progressing     Problem: Pain  Goal: Walks with improved pain control throughout the shift  Outcome: Progressing     Problem: Pain  Goal: Participates in PT with improved pain control throughout the shift  Outcome: Progressing   . Recommendations to address these barriers include   Problem: Pain  Goal: Free from opioid side effects throughout the shift  Outcome: Progressing   .

## 2024-09-09 NOTE — CONSULTS
Inpatient consult to Cardiology  Consult performed by: Tyshawn Dixon MD  Consult ordered by: LAWRENCE Oakes-CNP  Reason for consult: chest pain      History Of Present Illness:    Mr. Gael Burnette is a 74 y.o. former smoker diabetic male being consulted by the Cardiology team for chest pain. Patient with prior medical history significant for hypertension, diabetes mellitus, hyperlipidemia hypothyroidism. He presented to ED [facility] on 09/09/2024 complaining of chest pain.Patient states that he has had left sided chest pain for the past 7 days, worse with exertion, no radiation, self limited. Initial troponin was 65, repeat 54. Labwork otherwise unremarkable. EKG showed sinus tachycardia with non-specific ST-T changes. He was given aspirin and nitroglycerin. Patient was admitted for clinical compensation.      Last Recorded Vitals:  Vitals:    09/09/24 1230 09/09/24 1300 09/09/24 1330 09/09/24 1428   BP: 114/64 120/72 113/68 131/76   Pulse: 80 81 86 93   Resp: 16 17 17 18   Temp:    36.1 °C (96.9 °F)   SpO2: 95% 96% 96% 95%   Weight:       Height:           Last Labs:  CBC - 9/9/2024: 11:07 AM  6.8 16.7 190    49.2      CMP - 9/9/2024:  2:50 PM  9.2 6.1 17 --- 0.5   _ 4.1 17 57      PTT - 9/9/2024: 11:06 AM  0.9   10.7 31     Troponin I, High Sensitivity   Date/Time Value Ref Range Status   09/09/2024 12:17 PM 54 (HH) 0 - 20 ng/L Final     Comment:     Previous result verified on 9/9/2024 1214 on specimen/case [id number] called with component [facility] for procedure Troponin I, High Sensitivity, Initial with value 65 ng/L.   09/09/2024 11:07 AM 65 (HH) 0 - 20 ng/L Final     BNP   Date/Time Value Ref Range Status   09/09/2024 11:07 AM 54 0 - 99 pg/mL Final     Hemoglobin A1C   Date/Time Value Ref Range Status   06/18/2024 10:56 AM 7.4 (H) 4.3 - 5.6 % Final     Comment:     American Diabetes Association guidelines indicate that patients with HgbA1c in the range 5.7-6.4% are at  "increased risk for development of diabetes, and intervention by lifestyle modification may be beneficial. HgbA1c greater or equal to 6.5% is considered diagnostic of diabetes.   12/11/2023 09:48 AM 6.8 (H) 4.3 - 5.6 % Final     Comment:     American Diabetes Association guidelines indicate that patients with HgbA1c in the range 5.7-6.4% are at increased risk for development of diabetes, and intervention by lifestyle modification may be beneficial. HgbA1c greater or equal to 6.5% is considered diagnostic of diabetes.      Last I/O:  No intake/output data recorded.    Past Cardiology Tests (Last 3 Years):  EKG:  ECG 12 lead 09/09/2024 (Wet Read)      ECG 12 lead 09/09/2024 (Preliminary)    Echo:  No results found for this or any previous visit from the past 1095 days.    Ejection Fractions:  No results found for: \"EF\"  Cath:  No results found for this or any previous visit from the past 1095 days.    Stress Test:  No results found for this or any previous visit from the past 1095 days.    Cardiac Imaging:  No results found for this or any previous visit from the past 1095 days.      Past Medical History:  He has no past medical history on file.    Past Surgical History:  He has no past surgical history on file.      Social History:  He has no history on file for tobacco use, alcohol use, and drug use.    Family History:  No family history on file.     Allergies:  Patient has no known allergies.    Inpatient Medications:  Scheduled medications   Medication Dose Route Frequency    [START ON 9/10/2024] aspirin  81 mg oral Daily    enoxaparin  40 mg subcutaneous q24h    [START ON 9/10/2024] levothyroxine  112 mcg oral Daily    lisinopril  5 mg oral Nightly    [START ON 9/10/2024] pantoprazole  40 mg oral Daily before breakfast    polyethylene glycol  17 g oral Daily    simvastatin  20 mg oral Nightly    tamsulosin  0.4 mg oral Nightly     PRN medications   Medication    acetaminophen    ondansetron ODT    Or    " ondansetron    oxygen     Continuous Medications   Medication Dose Last Rate     Outpatient Medications:  Current Outpatient Medications   Medication Instructions    ascorbic acid (VITAMIN C) 1,000 mg, oral, Daily    aspirin 81 mg, oral, Daily    b complex 0.4 mg tablet 1 tablet, oral, Daily    cinnamon (CINNAMON) 1,000 mg, oral, 2 times daily    empagliflozin (JARDIANCE) 25 mg    glipiZIDE (GLUCOTROL) 5 mg, oral, 2 times daily before meals    glucosamine-chondroitin (Osteo Bi-Flex) 250-200 mg tablet 1 tablet, oral, Every morning    levothyroxine (SYNTHROID, LEVOXYL) 112 mcg, oral, Daily, Take on an empty stomach at the same time each day, either 30 to 60 minutes prior to breakfast    lisinopril 5 mg, oral, Daily    multivitamin tablet 1 tablet, oral, Daily    niacin 500 mg, oral, Daily    omega 3-dha-epa-fish oil (Fish OiL) 1,200 (144-216) mg capsule oral    omeprazole (PRILOSEC) 40 mg, oral, Daily before breakfast, Do not crush or chew.    simvastatin (ZOCOR) 20 mg, oral, Nightly    SITagliptin phos-metformin (Janumet) 50-1,000 mg tablet 1 tablet, oral, 2 times daily (morning and late afternoon)    tamsulosin (FLOMAX) 0.4 mg, oral, Daily    testosterone cypionate (DEPO-TESTOSTERONE) 200 mg, intramuscular, Every 14 days    triamcinolone (Kenalog) 0.1 % cream Topical, Daily PRN     Physical Exam:  General: alert, oriented and in no acute distress  HEENT: NC/AT; EOMI; PERRLA, external ear is normal  Neck: supple; trachea midline; no masses; no JVD  Chest: clear breath sounds bilaterally; no wheezing  Cardio: regular rhythm, S1S2 normal, no murmurs  Abdomen: Soft, non-tender, non-distension, no organomegaly  Extremities: no clubbing/cyanosis/edema  Neuro: Grossly intact     Psychiatric: Normal mood and affect      Assessment/Plan     Mr. Gael Burnette is a 74 y.o. former smoker diabetic male being consulted by the Cardiology team for chest pain. Patient with prior medical history significant for hypertension,  diabetes mellitus, hyperlipidemia hypothyroidism. He presented to ED [facility] on 09/09/2024 complaining of chest pain.Patient states that he has had left sided chest pain for the past 7 days, worse with exertion, no radiation, self limited. Initial troponin was 65, repeat 54. Labwork otherwise unremarkable. EKG showed sinus tachycardia with non-specific ST-T changes. He was given aspirin and nitroglycerin. Patient was admitted for clinical compensation.     Assessment    # NSTEMI / Typical chest pain  - Troponin was 65, repeat 54.   - BNP 54.  - EKG showed sinus tachycardia with non-specific ST-T changes.  - We had a thorough conversation with the patient about the natural history of atherosclerosis and the importance for commitment with healthy lifestyle, including but not limited to healthy diet, regular exercises, avoid sedentary and compliance to medication.   - Therefore we will request echocardiogram and Left Heart Catheterization.  - The natural history of atherosclerosis was discussed with the patient. The treatment options including GDMT, percutaneous intervention or surgical intervention were discussed with the patient. All the risks, benefits and alternative procedures were discussed. Complications of the procedure were also discussed, including but not limited to risk of bleeding, stroke, infarct, infection, urgent cardiac surgery or death. All questions were answered and the informed consent was obtained. After aforementioned testing and consultation, Left Heart Catheterization with potential Percutaneous Coronary Intervention would be a reasonable approach if the patient is candidate.  - Please keep NPO after midnight.  - Keep ASA; switch Simvastatin for high intensity statin - Atorvastatin 40mg daily; keep ACE-I.  - Start beta-blocker if no contra-indications - Metoprolol succinate 25mg daily.  - Follow up with Cardiology clinic upon discharge.    # Hypertension  - Controlled blood pressure.  -  Keep current medications including lisinopril 5mg daily.  - Patient counseled to keep a healthy lifestyle including regular exercise and low-sodium diet.  - Recommended home blood pressure monitoring.  - Goal of BP < 130/80mmHg.    # Diabetes  - Counseled on healthy diet and regular exercises.  - Discussed need for weight loss and the benefits.   - Keep current medications including empaglifozin, glipizide.  - ISS.    # Hyperlipidemia  - Keep home medication with Atorvastatin 40mg daily.  - Counseled on healthy diet and regular exercise.     # Hypothyroidism  - Keep Levothyroxine 112mcg daily     Patient seen in the ER. This critically ill patient continues to be at-risk for clinically significant deterioration / failure due to the above mentioned dysfunctional, unstable organ systems.  I have personally identified and managed all complex critical care issues to prevent aforementioned clinical deterioration.  Critical care time is spent at bedside and/or the immediate area and has included, but is not limited to, the review of diagnostic tests, labs, radiographs, serial assessments of hemodynamics, respiratory status, ventilatory management, and family updates.  Time spent in procedures and teaching are reported separately.    Critical care time: 65 minutes     Peripheral IV 09/09/24 20 G Right Antecubital (Active)   Site Assessment Clean;Dry;Intact 09/09/24 1500   Dressing Type Transparent 09/09/24 1500   Line Status Saline locked 09/09/24 1500   Dressing Status Clean;Dry 09/09/24 1500   Number of days: 0     Code Status:  Full Code    Tyshawn Dixon MD  Cardiology

## 2024-09-09 NOTE — H&P (VIEW-ONLY)
Inpatient consult to Cardiology  Consult performed by: Tyshawn Dixon MD  Consult ordered by: LAWRENCE Oakes-CNP  Reason for consult: chest pain      History Of Present Illness:    Mr. Gael Burnette is a 74 y.o. former smoker diabetic male being consulted by the Cardiology team for chest pain. Patient with prior medical history significant for hypertension, diabetes mellitus, hyperlipidemia hypothyroidism. He presented to ED [facility] on 09/09/2024 complaining of chest pain.Patient states that he has had left sided chest pain for the past 7 days, worse with exertion, no radiation, self limited. Initial troponin was 65, repeat 54. Labwork otherwise unremarkable. EKG showed sinus tachycardia with non-specific ST-T changes. He was given aspirin and nitroglycerin. Patient was admitted for clinical compensation.      Last Recorded Vitals:  Vitals:    09/09/24 1230 09/09/24 1300 09/09/24 1330 09/09/24 1428   BP: 114/64 120/72 113/68 131/76   Pulse: 80 81 86 93   Resp: 16 17 17 18   Temp:    36.1 °C (96.9 °F)   SpO2: 95% 96% 96% 95%   Weight:       Height:           Last Labs:  CBC - 9/9/2024: 11:07 AM  6.8 16.7 190    49.2      CMP - 9/9/2024:  2:50 PM  9.2 6.1 17 --- 0.5   _ 4.1 17 57      PTT - 9/9/2024: 11:06 AM  0.9   10.7 31     Troponin I, High Sensitivity   Date/Time Value Ref Range Status   09/09/2024 12:17 PM 54 (HH) 0 - 20 ng/L Final     Comment:     Previous result verified on 9/9/2024 1214 on specimen/case [id number] called with component [facility] for procedure Troponin I, High Sensitivity, Initial with value 65 ng/L.   09/09/2024 11:07 AM 65 (HH) 0 - 20 ng/L Final     BNP   Date/Time Value Ref Range Status   09/09/2024 11:07 AM 54 0 - 99 pg/mL Final     Hemoglobin A1C   Date/Time Value Ref Range Status   06/18/2024 10:56 AM 7.4 (H) 4.3 - 5.6 % Final     Comment:     American Diabetes Association guidelines indicate that patients with HgbA1c in the range 5.7-6.4% are at  "increased risk for development of diabetes, and intervention by lifestyle modification may be beneficial. HgbA1c greater or equal to 6.5% is considered diagnostic of diabetes.   12/11/2023 09:48 AM 6.8 (H) 4.3 - 5.6 % Final     Comment:     American Diabetes Association guidelines indicate that patients with HgbA1c in the range 5.7-6.4% are at increased risk for development of diabetes, and intervention by lifestyle modification may be beneficial. HgbA1c greater or equal to 6.5% is considered diagnostic of diabetes.      Last I/O:  No intake/output data recorded.    Past Cardiology Tests (Last 3 Years):  EKG:  ECG 12 lead 09/09/2024 (Wet Read)      ECG 12 lead 09/09/2024 (Preliminary)    Echo:  No results found for this or any previous visit from the past 1095 days.    Ejection Fractions:  No results found for: \"EF\"  Cath:  No results found for this or any previous visit from the past 1095 days.    Stress Test:  No results found for this or any previous visit from the past 1095 days.    Cardiac Imaging:  No results found for this or any previous visit from the past 1095 days.      Past Medical History:  He has no past medical history on file.    Past Surgical History:  He has no past surgical history on file.      Social History:  He has no history on file for tobacco use, alcohol use, and drug use.    Family History:  No family history on file.     Allergies:  Patient has no known allergies.    Inpatient Medications:  Scheduled medications   Medication Dose Route Frequency    [START ON 9/10/2024] aspirin  81 mg oral Daily    enoxaparin  40 mg subcutaneous q24h    [START ON 9/10/2024] levothyroxine  112 mcg oral Daily    lisinopril  5 mg oral Nightly    [START ON 9/10/2024] pantoprazole  40 mg oral Daily before breakfast    polyethylene glycol  17 g oral Daily    simvastatin  20 mg oral Nightly    tamsulosin  0.4 mg oral Nightly     PRN medications   Medication    acetaminophen    ondansetron ODT    Or    " ondansetron    oxygen     Continuous Medications   Medication Dose Last Rate     Outpatient Medications:  Current Outpatient Medications   Medication Instructions    ascorbic acid (VITAMIN C) 1,000 mg, oral, Daily    aspirin 81 mg, oral, Daily    b complex 0.4 mg tablet 1 tablet, oral, Daily    cinnamon (CINNAMON) 1,000 mg, oral, 2 times daily    empagliflozin (JARDIANCE) 25 mg    glipiZIDE (GLUCOTROL) 5 mg, oral, 2 times daily before meals    glucosamine-chondroitin (Osteo Bi-Flex) 250-200 mg tablet 1 tablet, oral, Every morning    levothyroxine (SYNTHROID, LEVOXYL) 112 mcg, oral, Daily, Take on an empty stomach at the same time each day, either 30 to 60 minutes prior to breakfast    lisinopril 5 mg, oral, Daily    multivitamin tablet 1 tablet, oral, Daily    niacin 500 mg, oral, Daily    omega 3-dha-epa-fish oil (Fish OiL) 1,200 (144-216) mg capsule oral    omeprazole (PRILOSEC) 40 mg, oral, Daily before breakfast, Do not crush or chew.    simvastatin (ZOCOR) 20 mg, oral, Nightly    SITagliptin phos-metformin (Janumet) 50-1,000 mg tablet 1 tablet, oral, 2 times daily (morning and late afternoon)    tamsulosin (FLOMAX) 0.4 mg, oral, Daily    testosterone cypionate (DEPO-TESTOSTERONE) 200 mg, intramuscular, Every 14 days    triamcinolone (Kenalog) 0.1 % cream Topical, Daily PRN     Physical Exam:  General: alert, oriented and in no acute distress  HEENT: NC/AT; EOMI; PERRLA, external ear is normal  Neck: supple; trachea midline; no masses; no JVD  Chest: clear breath sounds bilaterally; no wheezing  Cardio: regular rhythm, S1S2 normal, no murmurs  Abdomen: Soft, non-tender, non-distension, no organomegaly  Extremities: no clubbing/cyanosis/edema  Neuro: Grossly intact     Psychiatric: Normal mood and affect      Assessment/Plan     Mr. Gael Burnette is a 74 y.o. former smoker diabetic male being consulted by the Cardiology team for chest pain. Patient with prior medical history significant for hypertension,  diabetes mellitus, hyperlipidemia hypothyroidism. He presented to ED [facility] on 09/09/2024 complaining of chest pain.Patient states that he has had left sided chest pain for the past 7 days, worse with exertion, no radiation, self limited. Initial troponin was 65, repeat 54. Labwork otherwise unremarkable. EKG showed sinus tachycardia with non-specific ST-T changes. He was given aspirin and nitroglycerin. Patient was admitted for clinical compensation.     Assessment    # NSTEMI / Typical chest pain  - Troponin was 65, repeat 54.   - BNP 54.  - EKG showed sinus tachycardia with non-specific ST-T changes.  - We had a thorough conversation with the patient about the natural history of atherosclerosis and the importance for commitment with healthy lifestyle, including but not limited to healthy diet, regular exercises, avoid sedentary and compliance to medication.   - Therefore we will request echocardiogram and Left Heart Catheterization.  - The natural history of atherosclerosis was discussed with the patient. The treatment options including GDMT, percutaneous intervention or surgical intervention were discussed with the patient. All the risks, benefits and alternative procedures were discussed. Complications of the procedure were also discussed, including but not limited to risk of bleeding, stroke, infarct, infection, urgent cardiac surgery or death. All questions were answered and the informed consent was obtained. After aforementioned testing and consultation, Left Heart Catheterization with potential Percutaneous Coronary Intervention would be a reasonable approach if the patient is candidate.  - Please keep NPO after midnight.  - Keep ASA; switch Simvastatin for high intensity statin - Atorvastatin 40mg daily; keep ACE-I.  - Start beta-blocker if no contra-indications - Metoprolol succinate 25mg daily.  - Follow up with Cardiology clinic upon discharge.    # Hypertension  - Controlled blood pressure.  -  Keep current medications including lisinopril 5mg daily.  - Patient counseled to keep a healthy lifestyle including regular exercise and low-sodium diet.  - Recommended home blood pressure monitoring.  - Goal of BP < 130/80mmHg.    # Diabetes  - Counseled on healthy diet and regular exercises.  - Discussed need for weight loss and the benefits.   - Keep current medications including empaglifozin, glipizide.  - ISS.    # Hyperlipidemia  - Keep home medication with Atorvastatin 40mg daily.  - Counseled on healthy diet and regular exercise.     # Hypothyroidism  - Keep Levothyroxine 112mcg daily     Patient seen in the ER. This critically ill patient continues to be at-risk for clinically significant deterioration / failure due to the above mentioned dysfunctional, unstable organ systems.  I have personally identified and managed all complex critical care issues to prevent aforementioned clinical deterioration.  Critical care time is spent at bedside and/or the immediate area and has included, but is not limited to, the review of diagnostic tests, labs, radiographs, serial assessments of hemodynamics, respiratory status, ventilatory management, and family updates.  Time spent in procedures and teaching are reported separately.    Critical care time: 65 minutes     Peripheral IV 09/09/24 20 G Right Antecubital (Active)   Site Assessment Clean;Dry;Intact 09/09/24 1500   Dressing Type Transparent 09/09/24 1500   Line Status Saline locked 09/09/24 1500   Dressing Status Clean;Dry 09/09/24 1500   Number of days: 0     Code Status:  Full Code    Tyshawn Dixon MD  Cardiology

## 2024-09-09 NOTE — Clinical Note
Angioplasty of the middle left anterior descending lesion. Inflation 1: Pressure = 8 susan; Duration = 8 sec. Inflation 2: Pressure = 12 susan; Duration = 12 sec. Removed after inflations

## 2024-09-09 NOTE — Clinical Note
Vessel: LAD (mid). Stent inserted. Inflation 1: Pressure = 12 susan; Duration = 30 sec. Removed after deployment

## 2024-09-10 ENCOUNTER — APPOINTMENT (OUTPATIENT)
Dept: CARDIOLOGY | Facility: HOSPITAL | Age: 74
DRG: 322 | End: 2024-09-10
Payer: MEDICARE

## 2024-09-10 ENCOUNTER — HOSPITAL ENCOUNTER (OUTPATIENT)
Facility: HOSPITAL | Age: 74
Setting detail: OUTPATIENT SURGERY
End: 2024-09-10
Attending: STUDENT IN AN ORGANIZED HEALTH CARE EDUCATION/TRAINING PROGRAM | Admitting: STUDENT IN AN ORGANIZED HEALTH CARE EDUCATION/TRAINING PROGRAM
Payer: MEDICARE

## 2024-09-10 DIAGNOSIS — I25.10 CORONARY ATHEROSCLEROSIS: ICD-10-CM

## 2024-09-10 PROBLEM — R07.9 CHEST PAIN, UNSPECIFIED TYPE: Status: ACTIVE | Noted: 2024-09-10

## 2024-09-10 LAB
ANION GAP SERPL CALC-SCNC: 10 MMOL/L (ref 10–20)
AORTIC VALVE MEAN GRADIENT: 4 MMHG
AORTIC VALVE PEAK VELOCITY: 1.27 M/S
ATRIAL RATE: 78 BPM
AV PEAK GRADIENT: 6.5 MMHG
AVA (PEAK VEL): 2.89 CM2
AVA (VTI): 2.97 CM2
BUN SERPL-MCNC: 20 MG/DL (ref 6–23)
CALCIUM SERPL-MCNC: 9 MG/DL (ref 8.6–10.3)
CHLORIDE SERPL-SCNC: 105 MMOL/L (ref 98–107)
CO2 SERPL-SCNC: 25 MMOL/L (ref 21–32)
CREAT SERPL-MCNC: 1.31 MG/DL (ref 0.5–1.3)
EGFRCR SERPLBLD CKD-EPI 2021: 58 ML/MIN/1.73M*2
EJECTION FRACTION APICAL 4 CHAMBER: 55.3
EJECTION FRACTION: 60 %
GLUCOSE BLD MANUAL STRIP-MCNC: 127 MG/DL (ref 74–99)
GLUCOSE BLD MANUAL STRIP-MCNC: 146 MG/DL (ref 74–99)
GLUCOSE BLD MANUAL STRIP-MCNC: 272 MG/DL (ref 74–99)
GLUCOSE SERPL-MCNC: 164 MG/DL (ref 74–99)
HOLD SPECIMEN: NORMAL
LEFT ATRIUM VOLUME AREA LENGTH INDEX BSA: 7 ML/M2
LEFT VENTRICLE INTERNAL DIMENSION DIASTOLE: 4.64 CM (ref 3.5–6)
LEFT VENTRICULAR OUTFLOW TRACT DIAMETER: 2 CM
LV EJECTION FRACTION BIPLANE: 60 %
MITRAL VALVE E/A RATIO: 0.76
P AXIS: 74 DEGREES
P OFFSET: 185 MS
P ONSET: 132 MS
POTASSIUM SERPL-SCNC: 4.3 MMOL/L (ref 3.5–5.3)
PR INTERVAL: 188 MS
Q ONSET: 226 MS
QRS COUNT: 12 BEATS
QRS DURATION: 120 MS
QT INTERVAL: 396 MS
QTC CALCULATION(BAZETT): 451 MS
QTC FREDERICIA: 432 MS
R AXIS: -14 DEGREES
RIGHT VENTRICLE FREE WALL PEAK S': 12.9 CM/S
SODIUM SERPL-SCNC: 136 MMOL/L (ref 136–145)
T AXIS: 43 DEGREES
T OFFSET: 424 MS
TRICUSPID ANNULAR PLANE SYSTOLIC EXCURSION: 2.1 CM
VENTRICULAR RATE: 78 BPM

## 2024-09-10 PROCEDURE — B2151ZZ FLUOROSCOPY OF LEFT HEART USING LOW OSMOLAR CONTRAST: ICD-10-PCS | Performed by: STUDENT IN AN ORGANIZED HEALTH CARE EDUCATION/TRAINING PROGRAM

## 2024-09-10 PROCEDURE — 0270346 DILATION OF CORONARY ARTERY, ONE ARTERY, BIFURCATION, WITH DRUG-ELUTING INTRALUMINAL DEVICE, PERCUTANEOUS APPROACH: ICD-10-PCS | Performed by: STUDENT IN AN ORGANIZED HEALTH CARE EDUCATION/TRAINING PROGRAM

## 2024-09-10 PROCEDURE — 36415 COLL VENOUS BLD VENIPUNCTURE: CPT | Performed by: NURSE PRACTITIONER

## 2024-09-10 PROCEDURE — 99153 MOD SED SAME PHYS/QHP EA: CPT | Performed by: STUDENT IN AN ORGANIZED HEALTH CARE EDUCATION/TRAINING PROGRAM

## 2024-09-10 PROCEDURE — 2500000004 HC RX 250 GENERAL PHARMACY W/ HCPCS (ALT 636 FOR OP/ED): Performed by: STUDENT IN AN ORGANIZED HEALTH CARE EDUCATION/TRAINING PROGRAM

## 2024-09-10 PROCEDURE — 99152 MOD SED SAME PHYS/QHP 5/>YRS: CPT | Performed by: STUDENT IN AN ORGANIZED HEALTH CARE EDUCATION/TRAINING PROGRAM

## 2024-09-10 PROCEDURE — 2500000001 HC RX 250 WO HCPCS SELF ADMINISTERED DRUGS (ALT 637 FOR MEDICARE OP): Performed by: INTERNAL MEDICINE

## 2024-09-10 PROCEDURE — 02703Z6 DILATION OF CORONARY ARTERY, ONE ARTERY, BIFURCATION, PERCUTANEOUS APPROACH: ICD-10-PCS | Performed by: STUDENT IN AN ORGANIZED HEALTH CARE EDUCATION/TRAINING PROGRAM

## 2024-09-10 PROCEDURE — 93306 TTE W/DOPPLER COMPLETE: CPT | Performed by: STUDENT IN AN ORGANIZED HEALTH CARE EDUCATION/TRAINING PROGRAM

## 2024-09-10 PROCEDURE — 93010 ELECTROCARDIOGRAM REPORT: CPT | Performed by: STUDENT IN AN ORGANIZED HEALTH CARE EDUCATION/TRAINING PROGRAM

## 2024-09-10 PROCEDURE — C1760 CLOSURE DEV, VASC: HCPCS | Performed by: STUDENT IN AN ORGANIZED HEALTH CARE EDUCATION/TRAINING PROGRAM

## 2024-09-10 PROCEDURE — 2500000005 HC RX 250 GENERAL PHARMACY W/O HCPCS: Performed by: STUDENT IN AN ORGANIZED HEALTH CARE EDUCATION/TRAINING PROGRAM

## 2024-09-10 PROCEDURE — 2500000002 HC RX 250 W HCPCS SELF ADMINISTERED DRUGS (ALT 637 FOR MEDICARE OP, ALT 636 FOR OP/ED)

## 2024-09-10 PROCEDURE — 1200000002 HC GENERAL ROOM WITH TELEMETRY DAILY

## 2024-09-10 PROCEDURE — 92928 PRQ TCAT PLMT NTRAC ST 1 LES: CPT | Performed by: STUDENT IN AN ORGANIZED HEALTH CARE EDUCATION/TRAINING PROGRAM

## 2024-09-10 PROCEDURE — 94761 N-INVAS EAR/PLS OXIMETRY MLT: CPT

## 2024-09-10 PROCEDURE — 2500000005 HC RX 250 GENERAL PHARMACY W/O HCPCS: Performed by: NURSE PRACTITIONER

## 2024-09-10 PROCEDURE — C1769 GUIDE WIRE: HCPCS | Performed by: STUDENT IN AN ORGANIZED HEALTH CARE EDUCATION/TRAINING PROGRAM

## 2024-09-10 PROCEDURE — 93306 TTE W/DOPPLER COMPLETE: CPT

## 2024-09-10 PROCEDURE — C1887 CATHETER, GUIDING: HCPCS | Performed by: STUDENT IN AN ORGANIZED HEALTH CARE EDUCATION/TRAINING PROGRAM

## 2024-09-10 PROCEDURE — 92921 PR PRQ TRLUML CORONARY ANGIOPLASTY ADDL BRANCH: CPT | Performed by: STUDENT IN AN ORGANIZED HEALTH CARE EDUCATION/TRAINING PROGRAM

## 2024-09-10 PROCEDURE — C9600 PERC DRUG-EL COR STENT SING: HCPCS | Mod: LD | Performed by: STUDENT IN AN ORGANIZED HEALTH CARE EDUCATION/TRAINING PROGRAM

## 2024-09-10 PROCEDURE — 93005 ELECTROCARDIOGRAM TRACING: CPT

## 2024-09-10 PROCEDURE — 2550000001 HC RX 255 CONTRASTS: Performed by: STUDENT IN AN ORGANIZED HEALTH CARE EDUCATION/TRAINING PROGRAM

## 2024-09-10 PROCEDURE — 92979 ENDOLUMINL IVUS OCT C EA: CPT | Performed by: STUDENT IN AN ORGANIZED HEALTH CARE EDUCATION/TRAINING PROGRAM

## 2024-09-10 PROCEDURE — 2500000004 HC RX 250 GENERAL PHARMACY W/ HCPCS (ALT 636 FOR OP/ED): Performed by: NURSE PRACTITIONER

## 2024-09-10 PROCEDURE — C1894 INTRO/SHEATH, NON-LASER: HCPCS | Performed by: STUDENT IN AN ORGANIZED HEALTH CARE EDUCATION/TRAINING PROGRAM

## 2024-09-10 PROCEDURE — C1874 STENT, COATED/COV W/DEL SYS: HCPCS | Performed by: STUDENT IN AN ORGANIZED HEALTH CARE EDUCATION/TRAINING PROGRAM

## 2024-09-10 PROCEDURE — 2500000001 HC RX 250 WO HCPCS SELF ADMINISTERED DRUGS (ALT 637 FOR MEDICARE OP): Performed by: STUDENT IN AN ORGANIZED HEALTH CARE EDUCATION/TRAINING PROGRAM

## 2024-09-10 PROCEDURE — 92978 ENDOLUMINL IVUS OCT C 1ST: CPT | Performed by: STUDENT IN AN ORGANIZED HEALTH CARE EDUCATION/TRAINING PROGRAM

## 2024-09-10 PROCEDURE — 82947 ASSAY GLUCOSE BLOOD QUANT: CPT

## 2024-09-10 PROCEDURE — B2111ZZ FLUOROSCOPY OF MULTIPLE CORONARY ARTERIES USING LOW OSMOLAR CONTRAST: ICD-10-PCS | Performed by: STUDENT IN AN ORGANIZED HEALTH CARE EDUCATION/TRAINING PROGRAM

## 2024-09-10 PROCEDURE — 80048 BASIC METABOLIC PNL TOTAL CA: CPT | Performed by: NURSE PRACTITIONER

## 2024-09-10 PROCEDURE — 93458 L HRT ARTERY/VENTRICLE ANGIO: CPT | Performed by: STUDENT IN AN ORGANIZED HEALTH CARE EDUCATION/TRAINING PROGRAM

## 2024-09-10 PROCEDURE — 2500000002 HC RX 250 W HCPCS SELF ADMINISTERED DRUGS (ALT 637 FOR MEDICARE OP, ALT 636 FOR OP/ED): Performed by: NURSE PRACTITIONER

## 2024-09-10 PROCEDURE — 4A023N7 MEASUREMENT OF CARDIAC SAMPLING AND PRESSURE, LEFT HEART, PERCUTANEOUS APPROACH: ICD-10-PCS | Performed by: STUDENT IN AN ORGANIZED HEALTH CARE EDUCATION/TRAINING PROGRAM

## 2024-09-10 PROCEDURE — 99291 CRITICAL CARE FIRST HOUR: CPT | Performed by: STUDENT IN AN ORGANIZED HEALTH CARE EDUCATION/TRAINING PROGRAM

## 2024-09-10 PROCEDURE — 2720000007 HC OR 272 NO HCPCS: Performed by: STUDENT IN AN ORGANIZED HEALTH CARE EDUCATION/TRAINING PROGRAM

## 2024-09-10 PROCEDURE — 2780000003 HC OR 278 NO HCPCS: Performed by: STUDENT IN AN ORGANIZED HEALTH CARE EDUCATION/TRAINING PROGRAM

## 2024-09-10 PROCEDURE — 2500000002 HC RX 250 W HCPCS SELF ADMINISTERED DRUGS (ALT 637 FOR MEDICARE OP, ALT 636 FOR OP/ED): Performed by: INTERNAL MEDICINE

## 2024-09-10 PROCEDURE — C1753 CATH, INTRAVAS ULTRASOUND: HCPCS | Performed by: STUDENT IN AN ORGANIZED HEALTH CARE EDUCATION/TRAINING PROGRAM

## 2024-09-10 PROCEDURE — 96373 THER/PROPH/DIAG INJ IA: CPT | Performed by: STUDENT IN AN ORGANIZED HEALTH CARE EDUCATION/TRAINING PROGRAM

## 2024-09-10 PROCEDURE — C1725 CATH, TRANSLUMIN NON-LASER: HCPCS | Performed by: STUDENT IN AN ORGANIZED HEALTH CARE EDUCATION/TRAINING PROGRAM

## 2024-09-10 PROCEDURE — 2500000001 HC RX 250 WO HCPCS SELF ADMINISTERED DRUGS (ALT 637 FOR MEDICARE OP): Performed by: NURSE PRACTITIONER

## 2024-09-10 DEVICE — STENT ONYXNG25022UX ONYX 2.50X22RX
Type: IMPLANTABLE DEVICE | Site: HEART | Status: FUNCTIONAL
Brand: ONYX FRONTIER™

## 2024-09-10 RX ORDER — SODIUM CHLORIDE 9 MG/ML
100 INJECTION, SOLUTION INTRAVENOUS CONTINUOUS
Status: ACTIVE | OUTPATIENT
Start: 2024-09-10 | End: 2024-09-10

## 2024-09-10 RX ORDER — MIDAZOLAM HYDROCHLORIDE 1 MG/ML
INJECTION, SOLUTION INTRAMUSCULAR; INTRAVENOUS AS NEEDED
Status: DISCONTINUED | OUTPATIENT
Start: 2024-09-10 | End: 2024-09-10 | Stop reason: HOSPADM

## 2024-09-10 RX ORDER — VERAPAMIL HYDROCHLORIDE 2.5 MG/ML
INJECTION, SOLUTION INTRAVENOUS AS NEEDED
Status: DISCONTINUED | OUTPATIENT
Start: 2024-09-10 | End: 2024-09-10 | Stop reason: HOSPADM

## 2024-09-10 RX ORDER — FENTANYL CITRATE 50 UG/ML
INJECTION, SOLUTION INTRAMUSCULAR; INTRAVENOUS AS NEEDED
Status: DISCONTINUED | OUTPATIENT
Start: 2024-09-10 | End: 2024-09-10 | Stop reason: HOSPADM

## 2024-09-10 RX ORDER — HEPARIN SODIUM 1000 [USP'U]/ML
INJECTION, SOLUTION INTRAVENOUS; SUBCUTANEOUS AS NEEDED
Status: DISCONTINUED | OUTPATIENT
Start: 2024-09-10 | End: 2024-09-10 | Stop reason: HOSPADM

## 2024-09-10 RX ORDER — LIDOCAINE HYDROCHLORIDE 20 MG/ML
INJECTION, SOLUTION INFILTRATION; PERINEURAL AS NEEDED
Status: DISCONTINUED | OUTPATIENT
Start: 2024-09-10 | End: 2024-09-10 | Stop reason: HOSPADM

## 2024-09-10 RX ORDER — NITROGLYCERIN 40 MG/100ML
INJECTION INTRAVENOUS AS NEEDED
Status: DISCONTINUED | OUTPATIENT
Start: 2024-09-10 | End: 2024-09-10 | Stop reason: HOSPADM

## 2024-09-10 SDOH — SOCIAL STABILITY: SOCIAL INSECURITY: HAVE YOU HAD THOUGHTS OF HARMING ANYONE ELSE?: NO

## 2024-09-10 SDOH — ECONOMIC STABILITY: HOUSING INSECURITY: IN THE PAST 12 MONTHS, HOW MANY TIMES HAVE YOU MOVED WHERE YOU WERE LIVING?: 0

## 2024-09-10 SDOH — SOCIAL STABILITY: SOCIAL INSECURITY: WERE YOU ABLE TO COMPLETE ALL THE BEHAVIORAL HEALTH SCREENINGS?: YES

## 2024-09-10 ASSESSMENT — PAIN - FUNCTIONAL ASSESSMENT
PAIN_FUNCTIONAL_ASSESSMENT: 0-10

## 2024-09-10 ASSESSMENT — PAIN SCALES - GENERAL

## 2024-09-10 ASSESSMENT — PATIENT HEALTH QUESTIONNAIRE - PHQ9
2. FEELING DOWN, DEPRESSED OR HOPELESS: NOT AT ALL
SUM OF ALL RESPONSES TO PHQ9 QUESTIONS 1 & 2: 0
1. LITTLE INTEREST OR PLEASURE IN DOING THINGS: NOT AT ALL

## 2024-09-10 ASSESSMENT — LIFESTYLE VARIABLES
HOW OFTEN DO YOU HAVE A DRINK CONTAINING ALCOHOL: NEVER
SKIP TO QUESTIONS 9-10: 1
AUDIT-C TOTAL SCORE: 0
HOW MANY STANDARD DRINKS CONTAINING ALCOHOL DO YOU HAVE ON A TYPICAL DAY: PATIENT DOES NOT DRINK
AUDIT-C TOTAL SCORE: 0
HOW OFTEN DO YOU HAVE 6 OR MORE DRINKS ON ONE OCCASION: NEVER

## 2024-09-10 NOTE — CARE PLAN
Problem: Pain  Goal: Takes deep breaths with improved pain control throughout the shift  Outcome: Progressing  Goal: Turns in bed with improved pain control throughout the shift  Outcome: Progressing  Goal: Walks with improved pain control throughout the shift  Outcome: Progressing  Goal: Performs ADL's with improved pain control throughout shift  Outcome: Progressing  Goal: Participates in PT with improved pain control throughout the shift  Outcome: Progressing  Goal: Free from opioid side effects throughout the shift  Outcome: Progressing  Goal: Free from acute confusion related to pain meds throughout the shift  Outcome: Progressing     Problem: Pain - Adult  Goal: Verbalizes/displays adequate comfort level or baseline comfort level  Outcome: Progressing     Problem: Safety - Adult  Goal: Free from fall injury  Outcome: Progressing     Problem: Discharge Planning  Goal: Discharge to home or other facility with appropriate resources  Outcome: Progressing     Problem: Chronic Conditions and Co-morbidities  Goal: Patient's chronic conditions and co-morbidity symptoms are monitored and maintained or improved  Outcome: Progressing    The clinical goals for the shift include ambulation as tolerated

## 2024-09-10 NOTE — NURSING NOTE
I met with Gael Burnette in room 319  S/P PCI on 09/10/2024 with wife at bedside. He is alert and oriented and open to discussion. I reviewed contact information, allergies, current medications, and past medical history. We discussed what cardiac rehab entails, length of time, session length, education, and when we would be reaching out for Gael Burnette to start. Patient reported he is a former smoker that quit in 1981. Gael Burnette is interested in coming to cardiac rehab, however per patient he will be having another procedure in a couple weeks. Cardiac rehab folder reviewed and provided along with our contact information. We will reach out following the second procedure to discuss further. All questions answered.

## 2024-09-10 NOTE — ASSESSMENT & PLAN NOTE
-Telemetry  -Cardiac diet  -Cardiology consulted and treating  -Status post PCI to LAD.   -CBC BMP in the morning  -Cardiac rehab nurse into see patient.  -Patient loaded with ASA 325mg and Ticagrelor 180mg. Should be discharged home keeping ASA 81mg daily and Ticagrelor 90mg BID   -Keep hydration 100mL/h for at least 3 hours, ideally 6 hours.   -suggest to schedule PCI to 1st OM. Keep conservative treatment and guideline-directed medical therapy     Chronic conditions    Diabetes mellitus  -Cardiac diet controlled diet  -A1c 7.4  -Sliding scale insulin Accu-Cheks    Hyperlipidemia  -Continue aspirin and statin    Hypertension  -Currently continue home meds    For thyroidism  -Continue levothyroxine    DVT prophylaxis continue Lovenox SCDs frequent ambulation    Disposition Home in a.m. if patient remains medically stable

## 2024-09-10 NOTE — PROGRESS NOTES
Subjective Data:  Patient reports feeling well, no new adverse events overnight. Patient denies any chest pain, shortness of breath, palpitations, dizziness or syncope. Patient is hemodynamically stable.     Overnight Events:    No     Objective Data:  Last Recorded Vitals:  Vitals:    09/10/24 0500 09/10/24 0650 09/10/24 0745 09/10/24 0842   BP:   106/64 119/79   BP Location:   Right arm    Patient Position:   Lying    Pulse:   75 75   Resp:   18 14   Temp:   36.6 °C (97.9 °F) 36.6 °C (97.9 °F)   TempSrc:   Oral Temporal   SpO2:  94% 91%    Weight: 87.3 kg (192 lb 7.4 oz)      Height:           Last Labs:  CBC - 9/9/2024: 11:07 AM  6.8 16.7 190    49.2      CMP - 9/10/2024:  4:22 AM  9.0 6.1 17 --- 0.5   _ 4.1 17 57      PTT - 9/9/2024: 11:06 AM  0.9   10.7 31     TROPHS   Date/Time Value Ref Range Status   09/09/2024 12:17 PM 54 0 - 20 ng/L Final     Comment:     Previous result verified on 9/9/2024 1214 on specimen/case [id number] called with component [facility] for procedure Troponin I, High Sensitivity, Initial with value 65 ng/L.   09/09/2024 11:07 AM 65 0 - 20 ng/L Final     BNP   Date/Time Value Ref Range Status   09/09/2024 11:07 AM 54 0 - 99 pg/mL Final     HGBA1C   Date/Time Value Ref Range Status   06/18/2024 10:56 AM 7.4 4.3 - 5.6 % Final     Comment:     American Diabetes Association guidelines indicate that patients with HgbA1c in the range 5.7-6.4% are at increased risk for development of diabetes, and intervention by lifestyle modification may be beneficial. HgbA1c greater or equal to 6.5% is considered diagnostic of diabetes.   12/11/2023 09:48 AM 6.8 4.3 - 5.6 % Final     Comment:     American Diabetes Association guidelines indicate that patients with HgbA1c in the range 5.7-6.4% are at increased risk for development of diabetes, and intervention by lifestyle modification may be beneficial. HgbA1c greater or equal to 6.5% is considered diagnostic of diabetes.     LDLCALC   Date/Time Value Ref  "Range Status   09/09/2024 02:50 PM 59 <=99 mg/dL Final     Comment:                                 Near   Borderline      AGE      Desirable  Optimal    High     High     Very High     0-19 Y     0 - 109     ---    110-129   >/= 130     ----    20-24 Y     0 - 119     ---    120-159   >/= 160     ----      >24 Y     0 -  99   100-129  130-159   160-189     >/=190       VLDL   Date/Time Value Ref Range Status   09/09/2024 02:50 PM 60 0 - 40 mg/dL Final      Last I/O:  I/O last 3 completed shifts:  In: 640 (7.3 mL/kg) [P.O.:640]  Out: 1350 (15.5 mL/kg) [Urine:1350 (0.4 mL/kg/hr)]  Weight: 87.3 kg     Past Cardiology Tests (Last 3 Years):  EKG:  ECG 12 lead 09/09/2024 (Wet Read)      ECG 12 lead 09/09/2024 (Preliminary)    Echo:  No results found for this or any previous visit from the past 1095 days.    Ejection Fractions:  No results found for: \"EF\"  Cath:  No results found for this or any previous visit from the past 1095 days.    Stress Test:  No results found for this or any previous visit from the past 1095 days.    Cardiac Imaging:  No results found for this or any previous visit from the past 1095 days.      Inpatient Medications:  Scheduled medications   Medication Dose Route Frequency    aspirin  81 mg oral Daily    atorvastatin  40 mg oral Nightly    empagliflozin  25 mg oral Daily    enoxaparin  40 mg subcutaneous q24h    glipiZIDE  5 mg oral BID AC    insulin lispro  0-10 Units subcutaneous Before meals & nightly    levothyroxine  112 mcg oral Daily    lisinopril  5 mg oral Nightly    metoprolol succinate XL  25 mg oral Daily    pantoprazole  40 mg oral Daily before breakfast    polyethylene glycol  17 g oral Daily    tamsulosin  0.4 mg oral Nightly     PRN medications   Medication    acetaminophen    dextrose    dextrose    glucagon    glucagon    ondansetron ODT    Or    ondansetron    oxygen     Continuous Medications   Medication Dose Last Rate       Physical Exam:  General: alert, oriented and in no " acute distress  HEENT: NC/AT; EOMI; PERRLA, external ear is normal  Neck: supple; trachea midline; no masses; no JVD  Chest: clear breath sounds bilaterally; no wheezing  Cardio: regular rhythm, S1S2 normal, no murmurs  Abdomen: Soft, non-tender, non-distension, no organomegaly  Extremities: no clubbing/cyanosis/edema  Neuro: Grossly intact     Psychiatric: Normal mood and affect      Assessment/Plan     Mr. Gael Burnette is a 74 y.o. former smoker diabetic male being consulted by the Cardiology team for chest pain. Patient with prior medical history significant for hypertension, diabetes mellitus, hyperlipidemia hypothyroidism. He presented to ED [facility] on 09/09/2024 complaining of chest pain.Patient states that he has had left sided chest pain for the past 7 days, worse with exertion, no radiation, self limited. Initial troponin was 65, repeat 54. Labwork otherwise unremarkable. EKG showed sinus tachycardia with non-specific ST-T changes. He was given aspirin and nitroglycerin. Patient was admitted for clinical compensation.      Assessment     # NSTEMI / Typical chest pain  - Troponin was 65, repeat 54.   - BNP 54.  - EKG showed sinus tachycardia with non-specific ST-T changes.  - We had a thorough conversation with the patient about the natural history of atherosclerosis and the importance for commitment with healthy lifestyle, including but not limited to healthy diet, regular exercises, avoid sedentary and compliance to medication.   - Therefore we will request echocardiogram and Left Heart Catheterization.  - The natural history of atherosclerosis was discussed with the patient. The treatment options including GDMT, percutaneous intervention or surgical intervention were discussed with the patient. All the risks, benefits and alternative procedures were discussed. Complications of the procedure were also discussed, including but not limited to risk of bleeding, stroke, infarct, infection, urgent  cardiac surgery or death. All questions were answered and the informed consent was obtained. After aforementioned testing and consultation, Left Heart Catheterization with potential Percutaneous Coronary Intervention would be a reasonable approach if the patient is candidate.  - Please keep NPO.  - Keep ASA; Atorvastatin 40mg daily; keep ACE-I.  - Keep Metoprolol succinate 25mg daily.  - Follow up with Cardiology clinic upon discharge.     # Hypertension  - Controlled blood pressure.  - Keep current medications including lisinopril 5mg daily.  - Patient counseled to keep a healthy lifestyle including regular exercise and low-sodium diet.  - Recommended home blood pressure monitoring.  - Goal of BP < 130/80mmHg.     # Diabetes  - Counseled on healthy diet and regular exercises.  - Discussed need for weight loss and the benefits.   - Keep current medications including empaglifozin, glipizide.  - ISS.     # Hyperlipidemia  - Keep home medication with Atorvastatin 40mg daily.  - Counseled on healthy diet and regular exercise.      # Hypothyroidism  - Keep Levothyroxine 112mcg daily      Patient previously seen in the ER. This critically ill patient continues to be at-risk for clinically significant deterioration / failure due to the above mentioned dysfunctional, unstable organ systems.  I have personally identified and managed all complex critical care issues to prevent aforementioned clinical deterioration.  Critical care time is spent at bedside and/or the immediate area and has included, but is not limited to, the review of diagnostic tests, labs, radiographs, serial assessments of hemodynamics, respiratory status, ventilatory management, and family updates.  Time spent in procedures and teaching are reported separately.     Critical care time: 60 minutes    Peripheral IV 09/09/24 20 G Right Antecubital (Active)   Site Assessment Clean;Dry;Intact 09/10/24 0600   Number of days: 1       Code Status:  Full  Code    Tyshawn Dixon MD  Cardiology

## 2024-09-10 NOTE — DISCHARGE INSTRUCTIONS
CARDIAC CATHETERIZATION DISCHARGE INSTRUCTIONS     FOR SUDDEN AND SEVERE CHEST PAIN, SHORTNESS OF BREATH, EXCESSIVE BLEEDING, SIGNS OF STROKE, OR CHANGES IN MENTAL STATUS YOU SHOULD CALL 911 IMMEDIATELY.     If your provider has prescribed aspirin and/or clopidogrel (Plavix), or prasugrel (Effient), or ticagrelor (Brilinta), DO NOT STOP THESE MEDICATIONS for any reason without talking to your cardiologist first. If any of these were prescribed, you must take them every day without missing a single dose. If you are getting low on these medications, contact your provider immediately for a refill.     FOR NEXT 24 HOURS  - Upon discharge, you should return home and rest for the remainder of the day and evening. You do not have to stay on bed rest but should not be very active.  It is recommended a responsible adult be with you for the first 24 hours after the procedure.    - No driving for 24 hours after procedure. Please arrange for someone to drive you home from the hospital today.     - Do not drive, operate machinery, or use power tools for 24 hours after your procedure.     - Do not make any legal decisions for 24 hours after your procedure.     - Do not drink alcoholic beverages for 24 hours after your procedure.    WOUND CARE   *FOR FEMORAL (LEG) ACCESS*  ·      Avoid heavy lifting (over 10 pounds) for 7 days, squatting or excessive bending for 2 days, and strenuous exercise for 7 days.  ·      No submerged bathing, swimming, or hot tubs for the next 7 days, or until fully healed.  ·      Avoid sexual activity for 3-4 days until any groin discomfort has ceased.    *FOR RADIAL (WRIST) ACCESS*  ·      No lifting more than 5 pounds or excessive use of the wrist for 24 hours - for example, treat your wrist as if it is sprained.  ·      Do not engage in vigorous activities (tennis, golf, bowling, weights) for at least 48 hours after the procedure.  ·      Do not submerge the wrist for 7 days after the  procedure.  ·      You should expect mild tingling in your hand and tenderness at the puncture site for up to 3 days.    - The transparent dressing should be removed from the site 24 hours after the procedure.  Wash the site gently with soap and water. Rinse well and pat dry. Keep the area clean and dry. You may apply a Band-Aid to the site. Avoid lotions, ointments, or powders until fully healed.     - You may shower the day after your procedure.      - It is normal to notice a small bruise around the puncture site and/or a small grape sized or smaller lump. Any large bruising or large lump warrants a call to the office.     - If bleeding should occur, lay down and apply pressure to the affected area for 10 minutes.  If the bleeding stops notify your physician.  If there is a large amount of bleeding or spurting of blood CALL 911 immediately.  DO NOT drive yourself to the hospital.    - You may experience some tenderness, bruising or minimal inflammation.  If you have any concerns, you may contact the Cath Lab or if any of these symptoms become excessive, contact your cardiologist or go to the emergency room.     OTHER INSTRUCTIONS  - You may take acetaminophen (Tylenol) as directed for discomfort.  If pain is not relieved with acetaminophen (Tylenol), contact your doctor.    - If you notice or experience any of the following, you should notify your doctor or seek medical attention  Chest pain or discomfort  Change in mental status or weakness in extremities.  Dizziness, light headedness, or feeling faint.  Change in the site where the procedure was performed, such as bleeding or an increased area of bruising or swelling.  Tingling, numbness, pain, or coolness in the leg/arm beyond the site where the procedure was performed.  Signs of infection (i.e. shaking chills, temperature > 100 degrees Fahrenheit, warmth, redness) in the leg/arm area where the procedure was performed.  Changes in urination   Bloody or black  stools  Vomiting blood  Severe nose bleeds  Any excessive bleeding    - If you DO NOT have an appointment with your cardiologist within 2-4 weeks following your procedure, please contact their office.    Lifestyle Recommendations for a Healthier Life:    The following lifestyle changes can have a significant positive impact on your overall health - helping you to achieve healthy weight, lower metabolic and heart disease risk and manage stress more effectively:      1. Eat whole, fresh foods. Food is one of the biggest drivers of our overall health.  Make your meals whole foods based, focusing on a wide variety of non starchy vegetables and fruits.  It also beneficial to include various nuts, seeds and high-quality animal proteins for overall balanced diet.    2. Remove the sweeteners from your diet.  Artificial sweeteners have a negative impact on our metabolic health - they can cause increase in both glucose and insulin, increasing the risk or potential for insulin resistance and abnormal blood sugar levels.  Artificial sweeteners are also excessively sweeter than regular sugar, they trick our taste buds into craving extreme forms of sweet, like an addiction.  I encourage you to avoid sugar, but also stevia, aspartame, sucralose, sugar alcohols like xylitol and maltitol.    3. Get rid of the inflammatory factors in your diet - this mainly comes from sugars of all kinds and refined vegetable oils.  These can increase our risk for changes in our metabolic health which can lead to diabetes, heart disease and other chronic disease.  You can reverse or combat the effective of inflammatory foods by increasing your intake of anti-inflammatory foods like wild-caught fish, fresh ground flax seed, fish oil and variety of fruits & vegetables.      4. Eat plenty of fiber. The standard American diet has very low levels of daily dietary fiber, many people barely get 15 grams per day.  There is plenty of nutrition research that  shows how high-fiber foods can help lower our blood sugar.   Eat a wide variety of fiber-rich plant-based foods including nuts, seeds, fruits, vegetables, and legumes.    5. Get enough sleep. Studies from experts in endocrinology & metabolism have shown that even a few nights of poor sleep can increase the risk of insulin resistance and abnormal blood sugar values. Make sleep a priority - it is an important factor in your health.  Develop a sleep routine including: avoid eating two-three hours before bed, practice relaxation techniques (warm bath, meditation, yoga, mindfulness) to help relax your muscles and prepare you for sleep.    Go to bed and wake up at consistent times.  Avoid screen time for at least 60-90 minutes before bedtime.    6. Make exercise part of your regular routine.  Exercise helps us manage blood sugar more effectively and makes our cells more receptive to the effect of the insulin our body makes (lowers blood sugar).  Regular aerobic exercise AND interval or strength training are ideal to help maintain a healthy weight, metabolism & lower the risk of chronic disease.      7. Control stress levels. Chronic stress can lead to elevated blood sugar, elevated blood pressure, accumulation of fat around the belly and these things increase the risk for metabolic syndrome, diabetes and heart disease.  We all have various levels of stress in our lives, we can't control all of it, but we can control how we respond to it and manage it's impact.  Find healthy outlets for stress management like meditation, yoga, deep breathing, or exercise.    Home BP monitoring instructions:   Goal < 130 / 80   Remain still, Avoid smoking, caffeinated beverages, or exercise within 30 min before BP measurements.  Ensure 5 min of quiet rest before BP measurements.  Sit correctly with back straight and supported (on a straight-backed dining chair, for example, rather than a sofa).  Sit with feet flat on the floor and legs  uncrossed.  Keep arm supported on a flat surface (such as a table), with the upper arm at heart level.  Bottom of the cuff should be placed directly above the bend of the elbow  Take a reading in the AM before breakfast 2-3 times / week   Record all readings accurately:  A written log should be brought to all appointments   Monitors with built-in memory should be brought to all clinic appointments and calibrated to our machines      Weight Management:  Since food equals calories, in order to lose weight you must either eat fewer calories, exercise more to burn off calories with activity, or both. Food that is not used to fuel the body is stored as fat.    A major component of losing weight is to make smarter food choices. Here's how:    Limit non-nutritious foods, such as:  Sugar, honey, syrups and candy  Pastries, donuts, pies, cakes and cookies  Soft drinks, sweetened juices and alcoholic beverages    Cut down on high-fat foods by:  Choosing poultry, fish or lean red meat  Choosing low-fat cooking methods, such as baking, broiling, steaming, grilling and boiling  Using low-fat or non-fat dairy products  Using vinaigrette, herbs, lemon or fat-free salad dressings  Avoiding fatty meats, such as garcia, sausage, hpam, ribs and luncheon meats  Avoiding high-fat snacks like nuts, chips and chocolate  Avoiding fried foods  Using less butter, margarine, oil and mayonnaise  Avoiding high-fat gravies, cream sauces and cream-based soups    Eat a variety of foods, including:  Fruit and vegetables that are raw, steamed or baked  Whole grains, breads, cereal, rice and pasta  Dairy products, such as low-fat or non-fat milk or yogurt, low-fat cottage cheese and low-fat cheese  Protein-rich foods like chicken, turkey, fish, lean meat and legumes, or beans    Change your eating habits:  Eat three balanced meals a day to help control your hunger  Watch portion sizes and eat small servings of a variety of foods  Choose low-calorie  snacks  Eat only when you are hungry and stop when you are satisfied  Eat slowly and try not to perform other tasks while eating  Find other activities to distract you from food, such as walking, taking up a hobby or being involved in the community  Include regular exercise in your daily routine  Find a support group, if necessary, for emotional support in your weight loss effort    Patient Education: Smoking Cessation  Making the commitment to quit smoking is one of the best choices that smokers can make for themselves, but also a difficult one. There are various opportunities for support available. Here is an overview of them.  There are various forms of nicotine replacement therapy available to assist with minimizing these symptoms. They are nicotine gum, nicotine patch, nicotine nasal spray, nicotine inhaler, and nicotine lozenge.  Which kind of nicotine replacement therapy will best work for you can be discussed with your doctor or nurse to help you understand the pros and cons of each.  Non-nicotine replacement therapy is also available. Bupropion (Wellbutrin, Zyban) is a mild anti-depressant that is also effective in helping people to quit smoking. It can be used alone or with nicotine replacement therapy.  Varenicline (Chantix) is another medication that helps people who what to quit. This medication is not a form of nicotine replacement therapy, but it helps with the withdrawal symptoms.  Studies have shown that the best success rates for people trying to quit include counseling and/or support groups such as the National Hel[employer] that is a free, confidential, 24/7, 365-day-a-year treatment referral and information service: 0-413-975-HELP (9357)    Some helpful hints include:  Avoidance. Stay away from smokers and places where you are tempted to smoke.  Activities. Exercise or do hobbies that keep your hands busy.  Alternatives. Use oral substitutes such as sugarless gum, hard candy, and carrot  sticks.  Change of habits. For example, if you usually smoke during a coffee break, then go for a walk instead.  Deep breathing. When you have the urge to smoke, do a deep breathing exercise and remind yourself why you quit.  Delay tactic. If you feel that you are about to light up, delay. Tell yourself you must wait 10 minutes. Often this simple trick will allow you to move beyond the urge  Discussion. Call a friend for support.  Drink of water. Use as an oral substitute such as water.  Many people say the reason they smoke is to deal with stress. Unfortunately, stress is a part of all our lives. When quitting, you will need to find new strategies to deal with stress. There are stress-management classes, and self-help books that can help you discover new ways to reduce and deal with stress.  The bottom line is: don't just try to go it alone-reach out for support to help you achieve your goal.

## 2024-09-10 NOTE — POST-PROCEDURE NOTE
Physician Transition of Care Summary  Invasive Cardiovascular Lab    Procedure Date: 9/10/2024  Attending:    * Tyshawn Dixon - Primary  Resident/Fellow/Other Assistant: Surgeons and Role:  * No surgeons found with a matching role *    Indications:   Pre-op Diagnosis      * Chest pain, unspecified type [R07.9]     * Elevated troponin [R79.89]    Post-procedure diagnosis:   Post-op Diagnosis     * Chest pain, unspecified type [R07.9]     * Elevated troponin [R79.89]    Procedure(s):   Left Heart Cath, With LV    PCI XOCHILT Stent- Coronary    IVUS - Coronary  53248 - [place] ENDOLUMINAL CORONARY IVUS OCT I&R INITIAL VESSEL    Procedure Findings:   Double vessel Coronary Artery Disease  Mid LAD 95% calcified lesion - bifurcation with 1st Dg (70% ostial lesion) and 2nd Dg (50% ostial lesion)   Mid 1st OM 90% calcified lesion  RCA with irregularities  Preserved LV systolic function  S/P Successful PCI to mid LAD (bifurcation with 1st and 2nd Dg) using one XOCHILT, guided by IVUS    Description of the Procedure:     R radial artery access with 6F sheath. LV and Ao hemodynamic measurements. S/P Left Heart Catheterization that showed double vessel obstructive coronary artery disease.   Mid LAD 95% calcified lesion - bifurcation with 1st Dg (70% ostial lesion) and 2nd Dg (50% ostial lesion)  Mid 1st OM 90% calcified lesion  RCA with irregularities  Preserved LV systolic function    Preserved LV systolic function. Patient remained stable during the entire procedure. No complications. Hemostasis with TR Band.    Catheter has been switched to 6F XB 3.5. Heparinization 100ui/Kg. ACT > 250s.     S/P successful percutaneous coronary intervention to mid LAD using one drug-eluting stent (XOCHILT) 2.5/22mm (post-dil 2.75mm NC balloon) guided by intra-vascular ultrasound (IVUS). Successful POBA to 1st Dg (bifurcation lesion). Patient remained stable during the entire procedure. No complications. Hemostasis with TR Band.     Plan:     Patient loaded with ASA 325mg and Ticagrelor 180mg. Should be discharged home keeping ASA 81mg daily and Ticagrelor 90mg BID.   Compared to the pre-procedural EKG, post-procedural EKG with no new abnormalities and no signs of acute coronary syndrome.   Keep hydration 100mL/h for at least 3 hours, ideally 6 hours.   Bed rest for 3 hours. Constant check for bleeding. Maintain compression band (CB) for 120 minutes past procedure. Remove 3mL of air from CB every 15 minutes until fully deflated. If recurrent bleeding occurs, re-inflate with enough air to fully restore hemostasis (2 to 3 mL, maximum of 18 mL). Maintain CB at this same level for 30 minutes before attempting to re-deflate. Once fully deflated, carefully remove CB and place a sterile dressing over access site.  Observe for one hour, checking pulse every 15 minutes.  Instruct patient not to use or bend their wrist for four hours.    Cardiac rehab. Cardiology follow up. Would suggest to schedule PCI to 1st OM. Keep conservative treatment and guideline-directed medical therapy (GDMT).     Complications:   No    Stents/Implants:   Implants       Stent    Stent, Buzz Tie Siding Phillip, 2.50 X 22rx - [id number] - Implanted        Inventory item: STENT, BUZZ FRONTIER PHILLIP, 2.50 X 22RX Model/Cat number: [id number]    : [employer] Lot number: [id number]    Device identifier: [id number]        As of 9/10/2024       Status: Implanted                              Anticoagulation/Antiplatelet Plan:   Should be discharged home keeping ASA 81mg daily and Ticagrelor 90mg BID.     Estimated Blood Loss:   5 mL    Anesthesia: Moderate Sedation Anesthesia Staff: No anesthesia staff entered.    Any Specimen(s) Removed:   No specimens collected during this procedure.    Disposition:   Floor    Electronically signed by: Tyshawn Dixon MD, 9/10/2024 12:28 PM

## 2024-09-10 NOTE — PROGRESS NOTES
Troy Burnette is a 74 y.o. male on day 0 of admission presenting with Chest pain.      Subjective   Patient sitting up in bed eating lunch his wife brought him.  He denies chest pain or shortness of breath.  No bleeding noted at access site.        Objective     Last Recorded Vitals  /88 (BP Location: Left arm, Patient Position: Sitting)   Pulse 96   Temp 36.6 °C (97.9 °F) (Temporal)   Resp 16   Wt 87.3 kg (192 lb 7.4 oz)   SpO2 97%   Intake/Output last 3 Shifts:    Intake/Output Summary (Last 24 hours) at 9/10/2024 1446  Last data filed at 9/10/2024 1106  Gross per 24 hour   Intake 640 ml   Output 1905 ml   Net -1265 ml       Admission Weight  Weight: 88.5 kg (195 lb) (09/09/24 1103)    Daily Weight  09/10/24 : 87.3 kg (192 lb 7.4 oz)    Image Results  ECG 12 Lead  Normal sinus rhythm  Nonspecific intraventricular conduction delay  Borderline ECG  When compared with ECG of 09-SEP-2024 10:52, (unconfirmed)  Nonspecific intraventricular conduction delay has replaced Incomplete right bundle branch block  Cardiac Catheterization Procedure           [facility]     [address] [place] [address]  Phone [phone], Fax [phone]    Cardiovascular Catheterization Report    Patient Name:      TROY BURNETTE     Performing Physician:  Purvi Dixon MD  Study Date:        9/10/2024           Verifying Physician:   Purvi Dixon MD  MRN/PID:           [id number]            Cardiologist/Co-Scrub:  Accession#:        [id number]        Ordering Provider:     Purvi DIXON  Date of Birth/Age: 1950 / 74 years Cardiologist:  Gender:            M                   Fellow:  Encounter#:        [id number]          Surgeon:       Study:             Left Heart Cath  Additional Study: PCI - Percutaneous Coronary Intervention  Additional Study: IVUS - Intravascular Ultrasound       Indications:  TROY CARRANZA is a 74 year old male who presents with hypertension, dyslipidemia, obesity and a chest pain assessment of typical angina. NSTE - ACS.     Appropriate Use Criteria:  Non ST elevation myocardial infarction with high risk score; AUC score = 9.  Stress test performed: No. CTA performed: No. Jacob accessed: No. LVEF  Assessed: No.  Cardiac arrest: No.  Cardiac surgical consult: No.  Cardiovascular Instability: No  Frailty status of patient entering lab: 6 = Moderately frail.       Procedure Description:  After infiltration with 2% Lidocaine, the right radial artery was cannulated with a modified Seldinger technique. Subsequently a 6 Lao sheath was placed in the right radial artery. Selective coronary catheterization was performed using a 5 Fr catheter(s) exchanged over a guide wire to cannulate the coronary arteries. A 5 Fr Tiger catheter was used for left and right coronary artery injections.  Additional catheter(s) used to visualize the coronary arteries were: 6F XB 3.5. Multiple injections of contrast were made into the left and right coronary arteries with angiograms recorded in multiple projections. After completion of the procedure, the arterial sheath was pulled and a TR Band Radial Compression Device was utilized to obtain patent hemostasis.     Coronary Angiography:  The coronary circulation is right dominant.     Left Main Coronary Artery:  The left main coronary artery is a normal caliber vessel. The left main arises normally from the left coronary sinus of Valsalva and bifurcates into the LAD and circumflex coronary arteries. The left main coronary artery showed a normal vessel.     Left Anterior Descending Coronary Artery Distribution:  The left anterior descending coronary artery is a normal caliber vessel. The LAD arises  normally from the left main coronary artery. The LAD demonstrated atherosclerotic disease. The mid left anterior descending coronary artery showed 95% stenosis. This lesion was calcified. The 1st diagonal branch is a normal caliber vessel. The 1st diagonal branch showed atherosclerotic disease. The ostial 1st diagonal branch revealed 70% stenosis. This lesion was irregular. The 2nd diagonal branch is a medium-sized caliber vessel. The 2nd diagonal branch demonstrated atherosclerotic disease. The ostial 2nd diagonal branch showed 50% stenosis. This lesion was irregular.     Circumflex Coronary Artery Distribution:  The circumflex coronary artery is a normal caliber vessel. The circumflex arises normally from the left main coronary artery and terminates in the AV groove. The circumflex revealed no significant disease or stenosis greater than 30%. The 1st obtuse marginal branch is a normal caliber vessel. The 1st obtuse marginal branch showed atherosclerotic disease and calcification. The mid 1st obtuse marginal branch showed 90% stenosis. This lesion was calcified. The 2nd obtuse marginal branch is a small caliber vessel. The 2nd obtuse marginal branch demonstrated no significant disease or stenosis greater than 30%.     Right Coronary Artery Distribution:    The right coronary artery is a normal caliber vessel. The RCA arises normally from the right sinus of Valsalva. The RCA showed no significant disease or stenosis greater than 30%. The right posterolateral branch is a normal caliber vessel. The right posterolateral branch showed no significant disease or stenosis greater than 30%. The right posterior descending artery is a normal caliber vessel. The right posterior descending artery showed no significant disease or stenosis greater than 30%.       Left Ventriculography:  The LV ejection fraction was 60 to 65%. All left ventricular regional wall segments contract normally.     Coronary Interventions:  Angiography  reveals a 95% stenosis of the mid left anterior descending coronary artery. Bifurcation with 1st Dg 70% with ostial lesion and 2nd Dg 50% ostial lesion. Pre-intervention JANUARY flow was 3. Percutaneous coronary intervention was performed within the mid left anterior descending. The vessel was pre-dilated using a compliant balloon 2.5 mm x 15 mm at 12 HUONG. [place] Jovon drug-eluting stent 2.5 mm x 22 mm was advanced to the lesion and implanted at 12 HUONG. The stent was post dilated using a non-compliant balloon 2.75 mm x 15 mm at 24 HUONG. The stenosis was successfully reduced from 95% to <10%. Post intervention Intravascular Ultrasound (IVUS) was performed within the mid left anterior descending . The stent demonstrated good apposition. No thrombus was visualized. No edge dissection is visualized. Post-intervention JANUARY flow was 3.     This was a bifurcation lesion. Angiography reveals an 70% stenosis of the ostial 1st diagonal. Pre-intervention JANUARY flow was 3. Percutaneous coronary intervention was performed within the ostial 1st diagonal. The vessel was pre-dilated using a compliant balloon 2.5 mm x 15 mm at 8 HUONG. The stenosis was successfully reduced from 70% to <10%. Post-intervention JANUARY flow was 3.     Coronary Lesion Summary:  Vessel         Stenosis   Vessel Segment  LAD          95% stenosis      mid  1st Diagonal 70% stenosis     ostial  2nd Diagonal 50% stenosis     ostial  OM 1         90% stenosis      mid       Hemo Personnel:  +-----------------------------+---------+  Name                         Duty       +-----------------------------+---------+  Tyshawn Tatum MD 1  +-----------------------------+---------+       Hemodynamic Pressures:     +----+---------------+----------+-------------+--------------+-------+---------+  Site   Date Time   Phase NameSystolic mmHgDiastolic mmHgED mmHgMean  mmHg  +----+---------------+----------+-------------+--------------+-------+---------+    LV      9/10/2024  AIR REST          108             0      5                   10:29:47 AM                                                       +----+---------------+----------+-------------+--------------+-------+---------+    LV      9/10/2024  AIR REST          102             1     10                   10:29:55 AM                                                       +----+---------------+----------+-------------+--------------+-------+---------+   LVp      9/10/2024  AIR REST           90            -1      3                   10:30:13 AM                                                       +----+---------------+----------+-------------+--------------+-------+---------+   AOp      9/10/2024  AIR REST           87            46              60          10:30:20 AM                                                       +----+---------------+----------+-------------+--------------+-------+---------+    AO      9/10/2024  AIR REST           73            36              55          10:32:07 AM                                                       +----+---------------+----------+-------------+--------------+-------+---------+    AO      9/10/2024  AIR REST           65            40              50          10:33:01 AM                                                       +----+---------------+----------+-------------+--------------+-------+---------+    AO      9/10/2024  AIR REST           93            53              69          10:40:24 AM                                                       +----+---------------+----------+-------------+--------------+-------+---------+    AO      9/10/2024  AIR REST           99            49              70          10:40:36 AM                                                        +----+---------------+----------+-------------+--------------+-------+---------+    AO      9/10/2024  AIR REST           85            47              64          10:43:54 AM                                                       +----+---------------+----------+-------------+--------------+-------+---------+    AO      9/10/2024  AIR REST           93            44              64          10:49:45 AM                                                       +----+---------------+----------+-------------+--------------+-------+---------+    AO      9/10/2024  AIR REST           95            26              52          10:57:03 AM                                                       +----+---------------+----------+-------------+--------------+-------+---------+       Complications:  No in-lab complications observed.     Cardiac Cath Post Procedure Notes:  Post Procedure Diagnosis: Double vessel disease and XOCHILT of LAD.  Blood Loss:               Estimated blood loss during the procedure was 0 mls.  Specimens Removed:        Number of specimen(s) removed: none.       Recommendations:  Maximize medical therapy.  Agressive risk factor modification efforts.  Follow-up with cardiology clinic.  Anti-platelet therapy with Aspirin and Ticagrelor 90mgs BID.    ____________________________________________________________________________________  CONCLUSIONS:   1. Right coronary artery system dominance.   2. Double vessel Coronary Artery Disease.   3. Mid LAD 95% calcified lesion - bifurcation with 1st Dg (70% ostial lesion) and 2nd Dg (50% ostial lesion).   4. Mid 1st OM 90% calcified lesion.   5. RCA with irregularities.   6. Preserved LV systolic function.   7. S/P Successful PCI to mid LAD (bifurcation with 1st and 2nd Dg) using one XOCHILT, guided by IVUS.    ICD 10 Codes:  Non ST elevation (NSTEMI) myocardial infarction-I21.4     CPT Codes:  Left Heart  Cath (visualization of coronaries) and LV-27957; Stent w angioplasty Left Anterior Descending single major Artery branch (PCI)-28155.LD; IVUS, Left Anterior Descending initial Vessel (PCI)-36298.LD; IVUS, Left Main ea add'l Vessel (PCI)-68172.LM; Moderate Sedation Services initial 15 minutes patient >5 years-41358; Moderate Sedation Services 1st additional 15 minutes patient >5 years-52286     [id number] Suri Dixon MD  Performing Physician  Electronically signed by [id number] Suri Dixon MD on 9/10/2024 at  12:44:33 PM         ** Final **  Transthoracic Echo (TTE) [place] [address]  Phone [phone], Fax [phone]    TRANSTHORACIC ECHOCARDIOGRAM REPORT    Patient Name:      TROY Jacques Physician:    [id number] Ar Allen MD  Study Date:        9/10/2024             Ordering Provider:    [id number] SURI DIXON  MRN/PID:           [id number]              Fellow:  Accession#:        [id number]          Nurse:  Date of Birth/Age: 1950 / 74 years   Sonographer:          Octaviano Galvez RDCS  Gender:            M                     Additional Staff:  Height:            180.34 cm             Admit Date:  Weight:            87.09 kg              Admission Status:     Inpatient -                                                                 Routine  BSA / BMI:         2.07 m2 / 26.78 kg/m2 Department Location:  [address]  Blood Pressure: 99 /63 mmHg    Study Type:    TRANSTHORACIC ECHO (TTE) COMPLETE  Diagnosis/ICD: Other chest pain-R07.89  CPT Codes:     Echo Complete w Full Doppler-64974   Study Detail: The following Echo studies were performed: 2D, M-Mode, color flow                and  Doppler.       PHYSICIAN INTERPRETATION:  Left Ventricle: The left ventricular systolic function is normal, with a Basilio's biplane calculated ejection fraction of 60%. There are no regional wall motion abnormalities. The left ventricular cavity size is normal. Spectral Doppler shows a normal pattern of left ventricular diastolic filling.  Left Atrium: The left atrium is normal in size.  Right Ventricle: The right ventricle is normal in size. There is normal right ventricular global systolic function.  Right Atrium: The right atrium is normal in size.  Aortic Valve: The aortic valve is trileaflet. The aortic valve dimensionless index is 0.95. There is no evidence of aortic valve regurgitation. The peak instantaneous gradient of the aortic valve is 6.5 mmHg. The mean gradient of the aortic valve is 4.0 mmHg.  Mitral Valve: The mitral valve is normal in structure. There is no evidence of mitral valve regurgitation.  Tricuspid Valve: The tricuspid valve is structurally normal. No evidence of tricuspid regurgitation.  Pulmonic Valve: The pulmonic valve is not well visualized. There is no indication of pulmonic valve regurgitation.  Pericardium: There is no pericardial effusion noted.  Aorta: The aortic root is normal.  Systemic Veins: The inferior vena cava appears to be of normal size, IVC inspiratory collapse greater than 50%.       CONCLUSIONS:   1. The left ventricular systolic function is normal, with a Basilio's biplane calculated ejection fraction of 60%.   2. There is normal right ventricular global systolic function.    QUANTITATIVE DATA SUMMARY:     2D MEASUREMENTS:           Normal Ranges:  Ao Root d:       3.20 cm   (2.0-3.7cm)  LAs:             3.20 cm   (2.7-4.0cm)  IVSd:            1.02 cm   (0.6-1.1cm)  LVPWd:           0.84 cm   (0.6-1.1cm)  LVIDd:           4.64 cm   (3.9-5.9cm)  LVIDs:           2.88 cm  LV Mass Index:   70.5 g/m2  LV % FS          37.9 %       LA VOLUME:                   Normal  Ranges:  LA Vol A4C:        13.8 ml   (22+/-6mL/m2)  LA Vol A2C:        14.0 ml  LA Vol BP:         14.5 ml  LA Vol Index A4C:  6.7ml/m2  LA Vol Index A2C:  6.8 ml/m2  LA Vol Index BP:   7.0 ml/m2  LA Area A4C:       8.2 cm2  LA Area A2C:       7.9 cm2  LA Major Axis A4C: 4.2 cm  LA Major Axis A2C: 3.8 cm  LA Volume Index:   6.6 ml/m2  LA Vol A4C:        13.6 ml  LA Vol A2C:        14.0 ml  LA Vol Index BSA:  6.7 ml/m2       LV SYSTOLIC FUNCTION BY 2D PLANIMETRY (MOD):                       Normal Ranges:  EF-A4C View:    55 % (>=55%)  EF-A2C View:    65 %  EF-Biplane:     60 %  LV EF Reported: 60 %       LV DIASTOLIC FUNCTION:           Normal Ranges:  MV Peak E:             0.56 m/s  (0.7-1.2 m/s)  MV Peak A:             0.74 m/s  (0.42-0.7 m/s)  E/A Ratio:             0.76      (1.0-2.2)  MV e'                  0.108 m/s (>8.0)  MV lateral e'          0.12 m/s  MV medial e'           0.09 m/s  E/e' Ratio:            5.23      (<8.0)       MITRAL VALVE:          Normal Ranges:  MV DT:        243 msec (150-240msec)       AORTIC VALVE:                     Normal Ranges:  AoV Vmax:                1.27 m/s (<=1.7m/s)  AoV Peak P.5 mmHg (<20mmHg)  AoV Mean P.0 mmHg (1.7-11.5mmHg)  LVOT Max Daniel:            1.17 m/s (<=1.1m/s)  AoV VTI:                 25.60 cm (18-25cm)  LVOT VTI:                24.20 cm  LVOT Diameter:           2.00 cm  (1.8-2.4cm)  AoV Area, VTI:           2.97 cm2 (2.5-5.5cm2)  AoV Area,Vmax:           2.89 cm2 (2.5-4.5cm2)  AoV Dimensionless Index: 0.95       RIGHT VENTRICLE:  RV Basal 4.34 cm  RV Mid   3.30 cm  RV Major 7.6 cm  TAPSE:   21.2 mm  RV s'    0.13 m/s       [id number] Ar Allen MD  Electronically signed on 9/10/2024 at 10:36:53 AM       ** Final **      Physical Exam  Constitutional:       General: He is not in acute distress.     Appearance: He is well-developed.   HENT:      Head: Normocephalic and atraumatic.   Eyes:      Conjunctiva/sclera:  Conjunctivae normal.   Cardiovascular:      Rate and Rhythm: Normal rate and regular rhythm.      Heart sounds: No murmur heard.  Pulmonary:      Effort: Pulmonary effort is normal. No respiratory distress.      Breath sounds: Normal breath sounds. No decreased breath sounds, wheezing, rhonchi or rales.   Abdominal:      Palpations: Abdomen is soft.      Tenderness: There is no abdominal tenderness.   Musculoskeletal:         General: No swelling. Normal range of motion.      Cervical back: Normal range of motion and neck supple.      Right lower leg: No edema.      Left lower leg: No edema.   Skin:     General: Skin is warm and dry.      Capillary Refill: Capillary refill takes less than 2 seconds.   Neurological:      General: No focal deficit present.      Mental Status: He is alert.   Psychiatric:         Mood and Affect: Mood normal.  Relevant Results    Results for orders placed or performed during the hospital encounter of 09/09/24 (from the past 24 hour(s))   Basic Metabolic Panel   Result Value Ref Range    Glucose 194 (H) 74 - 99 mg/dL    Sodium 138 136 - 145 mmol/L    Potassium 4.4 3.5 - 5.3 mmol/L    Chloride 106 98 - 107 mmol/L    Bicarbonate 26 21 - 32 mmol/L    Anion Gap 10 10 - 20 mmol/L    Urea Nitrogen 16 6 - 23 mg/dL    Creatinine 1.09 0.50 - 1.30 mg/dL    eGFR 72 >60 mL/min/1.73m*2    Calcium 9.2 8.6 - 10.3 mg/dL   Lipid Panel   Result Value Ref Range    Cholesterol 149 0 - 199 mg/dL    HDL-Cholesterol 30.0 mg/dL    Cholesterol/HDL Ratio 5.0     LDL Calculated 59 <=99 mg/dL    VLDL 60 (H) 0 - 40 mg/dL    Triglycerides 301 (H) 0 - 149 mg/dL    Non HDL Cholesterol 119 0 - 149 mg/dL   POCT GLUCOSE   Result Value Ref Range    POCT Glucose 226 (H) 74 - 99 mg/dL   Basic Metabolic Panel   Result Value Ref Range    Glucose 164 (H) 74 - 99 mg/dL    Sodium 136 136 - 145 mmol/L    Potassium 4.3 3.5 - 5.3 mmol/L    Chloride 105 98 - 107 mmol/L    Bicarbonate 25 21 - 32 mmol/L    Anion Gap 10 10 - 20 mmol/L     Urea Nitrogen 20 6 - 23 mg/dL    Creatinine 1.31 (H) 0.50 - 1.30 mg/dL    eGFR 58 (L) >60 mL/min/1.73m*2    Calcium 9.0 8.6 - 10.3 mg/dL   Lavender Top   Result Value Ref Range    Extra Tube Hold for add-ons.    Transthoracic Echo (TTE) Complete   Result Value Ref Range    LVOT diam 2.00 cm    MV E/A ratio 0.76     AV pk lamine 1.27 m/s    AV mn grad 4.0 mmHg    LV Biplane EF 60 %    Tricuspid annular plane systolic excursion 2.1 cm    LA vol index A/L 7.0 ml/m2    LV EF 60 %    RV free wall pk S' 12.90 cm/s    LVIDd 4.64 cm    Aortic Valve Area by Continuity of Peak Velocity 2.89 cm2    Aortic Valve Area by Continuity of VTI 2.97 cm2    AV pk grad 6.5 mmHg    LV A4C EF 55.3    POCT GLUCOSE   Result Value Ref Range    POCT Glucose 146 (H) 74 - 99 mg/dL   ECG 12 Lead   Result Value Ref Range    Ventricular Rate 78 BPM    Atrial Rate 78 BPM    LA Interval 188 ms    QRS Duration 120 ms    QT Interval 396 ms    QTC Calculation(Bazett) 451 ms    P Axis 74 degrees    R Axis -14 degrees    T Axis 43 degrees    QRS Count 12 beats    Q Onset 226 ms    P Onset 132 ms    P Offset 185 ms    T Offset 424 ms    QTC Fredericia 432 ms         Assessment/Plan         Gael Burnette is a 74 y.o. male past medical history of diabetes mellitus, hypertension, hyperlipidemia hypothyroidism hypergonadism presented to the emergency department with chief complaint of chest pain.Initial troponin was 65, repeat 54. Labwork otherwise unremarkable. EKG showed sinus tachycardia. He was given aspirin and nitroglycerin.  Case was discussed with Dr. Cooper who will perform cardiac catheterization in the morning.    Assessment & Plan  Chest pain  -Telemetry  -Cardiac diet  -Cardiology consulted and treating  -Status post PCI to LAD.   -CBC BMP in the morning  -Cardiac rehab nurse into see patient.  -Patient loaded with ASA 325mg and Ticagrelor 180mg. Should be discharged home keeping ASA 81mg daily and Ticagrelor 90mg BID   -Keep hydration 100mL/h  for at least 3 hours, ideally 6 hours.   -suggest to schedule PCI to 1st OM. Keep conservative treatment and guideline-directed medical therapy     Chronic conditions    Diabetes mellitus  -Cardiac diet controlled diet  -A1c 7.4  -Sliding scale insulin Accu-Cheks    Hyperlipidemia  -Continue aspirin and statin    Hypertension  -Currently continue home meds    For thyroidism  -Continue levothyroxine    DVT prophylaxis continue Lovenox SCDs frequent ambulation    Disposition Home in a.m. if patient remains medically stable  Elevated troponin    Chest pain, unspecified type                  Rach Francisco, APRN-CNP

## 2024-09-11 VITALS
WEIGHT: 192.46 LBS | TEMPERATURE: 97.5 F | SYSTOLIC BLOOD PRESSURE: 97 MMHG | HEART RATE: 64 BPM | HEIGHT: 71 IN | RESPIRATION RATE: 19 BRPM | BODY MASS INDEX: 26.94 KG/M2 | DIASTOLIC BLOOD PRESSURE: 60 MMHG | OXYGEN SATURATION: 94 %

## 2024-09-11 PROBLEM — R07.9 CHEST PAIN: Status: RESOLVED | Noted: 2024-09-09 | Resolved: 2024-09-11

## 2024-09-11 PROBLEM — R79.89 ELEVATED TROPONIN: Status: RESOLVED | Noted: 2024-09-09 | Resolved: 2024-09-11

## 2024-09-11 PROBLEM — R07.9 CHEST PAIN, UNSPECIFIED TYPE: Status: RESOLVED | Noted: 2024-09-10 | Resolved: 2024-09-11

## 2024-09-11 LAB
ANION GAP SERPL CALC-SCNC: 11 MMOL/L (ref 10–20)
BUN SERPL-MCNC: 20 MG/DL (ref 6–23)
CALCIUM SERPL-MCNC: 9 MG/DL (ref 8.6–10.3)
CHLORIDE SERPL-SCNC: 105 MMOL/L (ref 98–107)
CO2 SERPL-SCNC: 25 MMOL/L (ref 21–32)
CREAT SERPL-MCNC: 1.21 MG/DL (ref 0.5–1.3)
EGFRCR SERPLBLD CKD-EPI 2021: 64 ML/MIN/1.73M*2
ERYTHROCYTE [DISTWIDTH] IN BLOOD BY AUTOMATED COUNT: 12.3 % (ref 11.5–14.5)
GLUCOSE BLD MANUAL STRIP-MCNC: 159 MG/DL (ref 74–99)
GLUCOSE BLD MANUAL STRIP-MCNC: 228 MG/DL (ref 74–99)
GLUCOSE SERPL-MCNC: 150 MG/DL (ref 74–99)
HCT VFR BLD AUTO: 46.7 % (ref 41–52)
HGB BLD-MCNC: 15.9 G/DL (ref 13.5–17.5)
MCH RBC QN AUTO: 31.5 PG (ref 26–34)
MCHC RBC AUTO-ENTMCNC: 34 G/DL (ref 32–36)
MCV RBC AUTO: 93 FL (ref 80–100)
NRBC BLD-RTO: 0 /100 WBCS (ref 0–0)
PLATELET # BLD AUTO: 189 X10*3/UL (ref 150–450)
POTASSIUM SERPL-SCNC: 4.2 MMOL/L (ref 3.5–5.3)
RBC # BLD AUTO: 5.05 X10*6/UL (ref 4.5–5.9)
SODIUM SERPL-SCNC: 137 MMOL/L (ref 136–145)
WBC # BLD AUTO: 8.2 X10*3/UL (ref 4.4–11.3)

## 2024-09-11 PROCEDURE — 36415 COLL VENOUS BLD VENIPUNCTURE: CPT | Performed by: NURSE PRACTITIONER

## 2024-09-11 PROCEDURE — 85027 COMPLETE CBC AUTOMATED: CPT | Performed by: NURSE PRACTITIONER

## 2024-09-11 PROCEDURE — 2500000001 HC RX 250 WO HCPCS SELF ADMINISTERED DRUGS (ALT 637 FOR MEDICARE OP): Performed by: NURSE PRACTITIONER

## 2024-09-11 PROCEDURE — 82947 ASSAY GLUCOSE BLOOD QUANT: CPT

## 2024-09-11 PROCEDURE — 2500000001 HC RX 250 WO HCPCS SELF ADMINISTERED DRUGS (ALT 637 FOR MEDICARE OP): Performed by: INTERNAL MEDICINE

## 2024-09-11 PROCEDURE — 2500000001 HC RX 250 WO HCPCS SELF ADMINISTERED DRUGS (ALT 637 FOR MEDICARE OP): Performed by: STUDENT IN AN ORGANIZED HEALTH CARE EDUCATION/TRAINING PROGRAM

## 2024-09-11 PROCEDURE — 2500000002 HC RX 250 W HCPCS SELF ADMINISTERED DRUGS (ALT 637 FOR MEDICARE OP, ALT 636 FOR OP/ED)

## 2024-09-11 PROCEDURE — 99222 1ST HOSP IP/OBS MODERATE 55: CPT

## 2024-09-11 PROCEDURE — 80048 BASIC METABOLIC PNL TOTAL CA: CPT | Performed by: NURSE PRACTITIONER

## 2024-09-11 PROCEDURE — 2500000002 HC RX 250 W HCPCS SELF ADMINISTERED DRUGS (ALT 637 FOR MEDICARE OP, ALT 636 FOR OP/ED): Performed by: INTERNAL MEDICINE

## 2024-09-11 RX ORDER — METOPROLOL SUCCINATE 25 MG/1
25 TABLET, EXTENDED RELEASE ORAL DAILY
Qty: 30 TABLET | Refills: 0 | Status: SHIPPED | OUTPATIENT
Start: 2024-09-12 | End: 2024-09-11

## 2024-09-11 RX ORDER — ATORVASTATIN CALCIUM 40 MG/1
40 TABLET, FILM COATED ORAL NIGHTLY
Qty: 30 TABLET | Refills: 0 | Status: SHIPPED | OUTPATIENT
Start: 2024-09-11 | End: 2024-09-11

## 2024-09-11 RX ORDER — ATORVASTATIN CALCIUM 40 MG/1
40 TABLET, FILM COATED ORAL NIGHTLY
Qty: 30 TABLET | Refills: 0 | Status: ON HOLD | OUTPATIENT
Start: 2024-09-11

## 2024-09-11 RX ORDER — METOPROLOL SUCCINATE 25 MG/1
25 TABLET, EXTENDED RELEASE ORAL DAILY
Qty: 30 TABLET | Refills: 0 | Status: ON HOLD | OUTPATIENT
Start: 2024-09-12 | End: 2024-10-12

## 2024-09-11 ASSESSMENT — PAIN SCALES - GENERAL
PAINLEVEL_OUTOF10: 0 - NO PAIN

## 2024-09-11 ASSESSMENT — COGNITIVE AND FUNCTIONAL STATUS - GENERAL
MOBILITY SCORE: 24
DAILY ACTIVITIY SCORE: 24

## 2024-09-11 ASSESSMENT — ACTIVITIES OF DAILY LIVING (ADL): LACK_OF_TRANSPORTATION: NO

## 2024-09-11 ASSESSMENT — PAIN - FUNCTIONAL ASSESSMENT
PAIN_FUNCTIONAL_ASSESSMENT: 0-10
PAIN_FUNCTIONAL_ASSESSMENT: 0-10

## 2024-09-11 NOTE — PROGRESS NOTES
Cardiology Inpatient Progress Note  [facility] [place]    ASSESSMENT AND PLAN  Chest pain  NSTEMI  Coronary artery disease  -Troponin 65-54  -BNP 54  -EKG shows sinus tachycardia with some nonspecific ST segment changes  -Echocardiogram showed normal biventricular systolic function with estimated LVEF of 60%  -Status post PCI to LAD with XOCHILT x 1 and plano balloon angioplasty to diagonal 1 on 9/10/2024 via right radial artery approach  -Scheduled for outpatient PCI to [facility] on 9/30/2024  -Please continue DAPT with aspirin and Brilinta 90 mg daily, other GDMT includes high intensity statin, beta-blocker and ACE inhibitor.  -Right radial arterial access site clean and dry with no hematoma or pain, strong distal pulse  -Stable from a cardiac standpoint for discharge today    Subjective  denies chest pain, shortness of breath, palpitations, leg edema, fever, chills, orthopnea, paroxysmal nocturnal dyspnea or syncope.     Objective:  Intake & Output  Net IO Since Admission: -625 mL [09/11/24 0834]    Today's Weight:  Vitals:    09/10/24 0500   Weight: 87.3 kg (192 lb 7.4 oz)       PHYSICAL EXAM  Physical Exam  Vitals and nursing note reviewed.   Constitutional:       General: He is not in acute distress.  HENT:      Head: Normocephalic and atraumatic.      Mouth/Throat:      Mouth: Mucous membranes are moist.      Pharynx: Oropharynx is clear.   Eyes:      General: No scleral icterus.     Pupils: Pupils are equal, round, and reactive to light.   Cardiovascular:      Rate and Rhythm: Normal rate and regular rhythm.      Pulses: Normal pulses.      Heart sounds: Normal heart sounds, S1 normal and S2 normal. No murmur heard.     No friction rub.   Pulmonary:      Effort: Pulmonary effort is normal.      Breath sounds: Normal breath sounds.   Abdominal:      General: Bowel sounds are normal. There is no distension.      Palpations: Abdomen is soft.      Tenderness: There is no  "abdominal tenderness.   Musculoskeletal:         General: No swelling. Normal range of motion.      Cervical back: Normal range of motion and neck supple.      Right lower leg: No edema.      Left lower leg: No edema.   Skin:     General: Skin is warm and dry.      Capillary Refill: Capillary refill takes less than 2 seconds.      Findings: No rash.   Neurological:      General: No focal deficit present.      Mental Status: He is alert.   Psychiatric:         Mood and Affect: Mood normal.         Behavior: Behavior normal.        Labs:     CMP:  Recent Labs     09/11/24  0428 09/10/24  0422 09/09/24  1450 09/09/24  1107    136 138 135*   K 4.2 4.3 4.4 4.6    105 106 102   CO2 25 25 26 26   ANIONGAP 11 10 10 12   BUN 20 20 16 16   CREATININE 1.21 1.31* 1.09 1.19   EGFR 64 58* 72 65   MG  --   --   --  1.93     Recent Labs     09/09/24  1107   ALBUMIN 4.1   ALKPHOS 57   ALT 17   AST 17   BILITOT 0.5     CBC:  Recent Labs     09/11/24  0428 09/09/24  1107   WBC 8.2 6.8   HGB 15.9 16.7   HCT 46.7 49.2    190   MCV 93 92     COAG:   Recent Labs     09/09/24  1106   INR 0.9   DDIMERVTE 418     ABO: No results for input(s): \"ABO\" in the last 08860 hours.  HEME/ENDO:  Recent Labs     09/09/24  1217 06/18/24  1056 12/11/23  0948 09/05/23  0955 06/12/23  0950   TSH 0.22*  --   --   --   --    HGBA1C  --  7.4* 6.8* 7.7* 7.7*      CARDIAC:   Recent Labs     09/09/24  1217 09/09/24  1107   TROPHS 54* 65*   BNP  --  54     Recent Labs     09/09/24  1450   CHOL 149   HDL 30.0   TRIG 301*       Inpatient Medications:    Current Facility-Administered Medications:     acetaminophen (Tylenol) tablet 650 mg, 650 mg, oral, q4h PRN, Rach Francisco APRN-CNP    aspirin EC tablet 81 mg, 81 mg, oral, Daily, LAWRENCE Oakes-CNP, 81 mg at 09/11/24 0824    atorvastatin (Lipitor) tablet 40 mg, 40 mg, oral, Nightly, Tyshawn Dixon MD, 40 mg at 09/10/24 2120    dextrose 50 % injection 12.5 g, 12.5 " g, intravenous, q15 min PRN, Roger Heller MD    dextrose 50 % injection 25 g, 25 g, intravenous, q15 min PRN, Roger Heller MD    empagliflozin (Jardiance) tablet 25 mg, 25 mg, oral, Daily, Roger Heller MD, 25 mg at 09/11/24 0824    enoxaparin (Lovenox) syringe 40 mg, 40 mg, subcutaneous, q24h, MALIK Oakes, 40 mg at 09/10/24 1511    glipiZIDE (Glucotrol) tablet 5 mg, 5 mg, oral, BID AC, Roger Heller MD, 5 mg at 09/11/24 0655    glucagon (Glucagen) injection 1 mg, 1 mg, intramuscular, q15 min PRN, Roger Heller MD    glucagon (Glucagen) injection 1 mg, 1 mg, intramuscular, q15 min PRN, Roger Heller MD    insulin lispro (HumaLOG) injection 0-10 Units, 0-10 Units, subcutaneous, Before meals & nightly, Roger Heller MD, 6 Units at 09/10/24 1720    levothyroxine (Synthroid, Levoxyl) tablet 112 mcg, 112 mcg, oral, Daily, MALIK Oakes, 112 mcg at 09/11/24 0655    lisinopril tablet 5 mg, 5 mg, oral, Nightly, MALIK Oakes, 5 mg at 09/10/24 2120    metoprolol succinate XL (Toprol-XL) 24 hr tablet 25 mg, 25 mg, oral, Daily, Tyshawn Dixon MD, 25 mg at 09/11/24 0824    ondansetron ODT (Zofran-ODT) disintegrating tablet 4 mg, 4 mg, oral, q8h PRN **OR** ondansetron (Zofran) injection 4 mg, 4 mg, intravenous, q8h PRN, MALIK Oakes    oxygen (O2) therapy, , inhalation, Continuous PRN - O2/gases, MALIK Oakes, 21 percent at 09/10/24 1837    pantoprazole (ProtoNix) EC tablet 40 mg, 40 mg, oral, Daily before breakfast, Rach Francisco, APRN-CNP, 40 mg at 09/11/24 0655    perflutren lipid microspheres (Definity) injection 0.5-10 mL of dilution, 0.5-10 mL of dilution, intravenous, Once in imaging, Tyshawn Dixon MD    perflutren protein A microsphere (Optison) injection 0.5 mL, 0.5 mL, intravenous, Once in imaging, Tyshawn Dixon MD    polyethylene glycol (Glycolax,  Miralax) packet 17 g, 17 g, oral, Daily, MALIK Oakes    sulfur hexafluoride microsphr (Lumason) injection 24.28 mg, 2 mL, intravenous, Once in imaging, Tyshawn Dixon MD    tamsulosin (Flomax) 24 hr capsule 0.4 mg, 0.4 mg, oral, Nightly, MALIK Oakes, 0.4 mg at 09/10/24 2120    ticagrelor (Brilinta) tablet 90 mg, 90 mg, oral, BID, MALIK Brown, 90 mg at 09/11/24 0824     VITALS  Vitals:    09/11/24 0734   BP: 108/66   Pulse: 78   Resp: 19   Temp: 36.2 °C (97.2 °F)   SpO2: 95%       Cardiology will continue to follow.     Thank you for this interesting clinical case and allowing me to participate in the care of this patient.  Please reach me out if you have any questions or if you need any clarifications regarding the patient's care.    **Disclaimer: This note was dictated by speech recognition, and every effort has been made to prevent any error in transcription, however minor errors may be present**  _________________________________________________________  Gael Kapoor, MSN, CNP, ACNPC, CCRN  Advanced Practice Provider, Nurse Practitioner  Division of Cardiovascular Medicine  [facility]  [facility]

## 2024-09-11 NOTE — CARE PLAN
The patient's goals for the shift include    Problem: Pain  Goal: Takes deep breaths with improved pain control throughout the shift  Outcome: Progressing  Goal: Turns in bed with improved pain control throughout the shift  Outcome: Progressing  Goal: Walks with improved pain control throughout the shift  Outcome: Progressing  Goal: Performs ADL's with improved pain control throughout shift  Outcome: Progressing  Goal: Participates in PT with improved pain control throughout the shift  Outcome: Progressing  Goal: Free from opioid side effects throughout the shift  Outcome: Progressing  Goal: Free from acute confusion related to pain meds throughout the shift  Outcome: Progressing     Problem: Pain - Adult  Goal: Verbalizes/displays adequate comfort level or baseline comfort level  Outcome: Progressing     Problem: Safety - Adult  Goal: Free from fall injury  Outcome: Progressing     Problem: Discharge Planning  Goal: Discharge to home or other facility with appropriate resources  Outcome: Progressing     Problem: Chronic Conditions and Co-morbidities  Goal: Patient's chronic conditions and co-morbidity symptoms are monitored and maintained or improved  Outcome: Progressing       The clinical goals for the shift include Pt will have no episodes of chest pain by end of shift.    Plans for pt to be discharged home today.

## 2024-09-11 NOTE — NURSING NOTE
Discharge Note: 9/11/2024 1235 Declines wheelchair, ambulates to elevator accompanied by wife, personal belongings taken by pt, no distress noted, no complaints voiced. Liliana MORILLO

## 2024-09-11 NOTE — NURSING NOTE
Discharge Note: 9/11/2024 1152 AVS and pt responsibilities reviewed with pt, pt wife, and copy given. Cardiac stent card given to pt. Heart Cath, chest pain, education reviewed with pt, pt wife, and information sheets given. Pt and wife verbalizes understanding of instructions received, verbalizes understanding of when to seek medical attention, denies any home going or personal care needs. Denies further questions or concerns. Reviewed follow up appts with pt, pt wife, and verbalizes understanding. Dressing dry and intact to right wrist. Liliana MORILLO

## 2024-09-11 NOTE — PROGRESS NOTES
09/11/24 1050   Discharge Planning   Living Arrangements Spouse/significant other   Support Systems Children;Spouse/significant other   Assistance Needed none   Type of Residence Private residence   Number of Stairs to Enter Residence 6   Number of Stairs Within Residence 12   Do you have animals or pets at home? No   Who is requesting discharge planning? Provider   Home or Post Acute Services None   Expected Discharge Disposition Home   Does the patient need discharge transport arranged? No   Financial Resource Strain   How hard is it for you to pay for the very basics like food, housing, medical care, and heating? Not hard   Housing Stability   In the last 12 months, was there a time when you were not able to pay the mortgage or rent on time? N   At any time in the past 12 months, were you homeless or living in a shelter (including now)? N   Transportation Needs   In the past 12 months, has lack of transportation kept you from medical appointments or from getting medications? no   In the past 12 months, has lack of transportation kept you from meetings, work, or from getting things needed for daily living? No     Care Transitions: Met with pt at the bedside and verified address, phone number and emergency contact information. PCP is  Dr Rivera last seen in February and preferred pharmacy is [facility] for long term and [facility] for short term, denies issues obtaining or affording medications and takes as ordered. Pt is independent, lives at home with wife and feels safe. Pt uses no DME or oxygen. He is diabetic and checks his blood sugar everyday. Plan is home no needs wife to transport. Reviewed IMM, denies questions, signed and dated, pt copy and original on chart. [facility] 24/24. ADOD today. CT to follow. Sandrine Weiss BSN/RN-TCC

## 2024-09-11 NOTE — DISCHARGE SUMMARY
Discharge Diagnosis  Chest pain    Issues Requiring Follow-Up  Double vessel coronary artery disease status post PCI    Discharge Meds     Medication List      START taking these medications     atorvastatin 40 mg tablet; Commonly known as: Lipitor; Take 1 tablet (40   mg) by mouth once daily at bedtime.   metoprolol succinate XL 25 mg 24 hr tablet; Commonly known as:   Toprol-XL; Take 1 tablet (25 mg) by mouth once daily. Do not crush or   chew.; Start taking on: September 12, 2024   ticagrelor 90 mg tablet; Commonly known as: Brilinta; Take 1 tablet (90   mg) by mouth 2 times a day.     CONTINUE taking these medications     ascorbic acid 1,000 mg tablet; Commonly known as: Vitamin C   aspirin 81 mg EC tablet   b complex 0.4 mg tablet   cinnamon 500 mg capsule   empagliflozin 25 mg; Commonly known as: Jardiance   Fish OiL 1,200 (144-216) mg capsule; Generic drug: omega 3-dha-epa-fish   oil   glipiZIDE 5 mg tablet; Commonly known as: Glucotrol   Janumet 50-1,000 mg tablet; Generic drug: SITagliptin phos-metformin   levothyroxine 112 mcg tablet; Commonly known as: Synthroid, Levoxyl   lisinopril 5 mg tablet   multivitamin tablet   omeprazole 40 mg DR capsule; Commonly known as: PriLOSEC   Osteo Bi-Flex 250-200 mg tablet; Generic drug: glucosamine-chondroitin   tamsulosin 0.4 mg 24 hr capsule; Commonly known as: Flomax   testosterone cypionate 200 mg/mL injection; Commonly known as:   Depo-Testosterone   triamcinolone 0.1 % cream; Commonly known as: Kenalog     STOP taking these medications     niacin 500 mg tablet   simvastatin 20 mg tablet; Commonly known as: Zocor       Test Results Pending At Discharge  Pending Labs       No current pending labs.            Hospital Course   Gael Burnette is a 74 y.o. male past medical history of diabetes mellitus, hypertension, hyperlipidemia hypothyroidism hypergonadism presented to the emergency department with chief complaint of chest pain x 7 days.  It was worse with  exertion, no radiation, self-limited.  Initial troponin was 65, repeat 54. Labwork otherwise unremarkable. EKG showed sinus tachycardia. He was given aspirin and nitroglycerin. Case was discussed with Dr. Cooper who will perform cardiac catheterization on 9/10/2024.      S/P successful percutaneous coronary intervention to mid LAD using one drug-eluting stent. Patient loaded with ASA 325mg and Ticagrelor 180mg. Should be discharged home keeping ASA 81mg daily and Ticagrelor 90mg BID.   Compared to the pre-procedural EKG, post-procedural EKG with no new abnormalities and no signs of acute coronary syndrome. Right radial arterial access site clean and dry with no hematoma or pain, strong distal pulse.   Additional recommendations for high intensity statin.  Zocor was changed to Lipitor.  Metoprolol 25 mg daily was also added.  He was discharged with a 30-day supply.  He was enrolled in cardiac rehab.  [facility]  Patient is scheduled for outpatient PCI to [facility] on 9/30/2024.  Patient is chest pain-free and has remained hemodynamically stable.  He will be discharged home today in stable condition.  He is to follow-up with cardiology as scheduled.  Recommend transition of care visit with PCP.        Pertinent Physical Exam At Time of Discharge  Physical Exam  Constitutional:       General: He is not in acute distress.  HENT:      Head: Normocephalic and atraumatic.      Mouth: Mucous membranes are moist.      Pharynx: Oropharynx is clear.   Eyes:      General: No scleral icterus.     Pupils: Pupils are equal, round, and reactive to light.   Cardiovascular:      Rate and Rhythm: Normal rate and regular rhythm.      Pulses: Normal pulses.      Heart sounds: Normal heart sounds, S1 normal and S2 normal. No murmur heard.     No friction rub.   Pulmonary:      Effort: Pulmonary effort is normal.      Breath sounds: Normal breath sounds.   Abdominal:      General: Bowel sounds  are normal. There is no distension.      Palpations: Abdomen is soft.      Tenderness: There is no abdominal tenderness.   Musculoskeletal:         General: No swelling. Normal range of motion.      Cervical back: Normal range of motion and neck supple.      Right lower leg: No edema.      Left lower leg: No edema.   Skin:     General: Skin is warm and dry.      Capillary Refill: Capillary refill takes less than 2 seconds.      Findings: No rash.   Neurological:      General: No focal deficit present.      Mental Status: He is alert.   Psychiatric:         Mood and Affect: Mood normal.         Behavior: Behavior normal.  Outpatient Follow-Up  Future Appointments   Date Time Provider Department Center   10/7/2024  2:30 PM Tyshawn Dixon MD [id number]         LAWRENCE Oakes-CNP

## 2024-09-14 ENCOUNTER — APPOINTMENT (OUTPATIENT)
Dept: RADIOLOGY | Facility: HOSPITAL | Age: 74
DRG: 322 | End: 2024-09-14
Payer: MEDICARE

## 2024-09-14 ENCOUNTER — HOSPITAL ENCOUNTER (OUTPATIENT)
Facility: HOSPITAL | Age: 74
Setting detail: OBSERVATION
DRG: 322 | End: 2024-09-14
Attending: EMERGENCY MEDICINE | Admitting: STUDENT IN AN ORGANIZED HEALTH CARE EDUCATION/TRAINING PROGRAM
Payer: MEDICARE

## 2024-09-14 ENCOUNTER — APPOINTMENT (OUTPATIENT)
Dept: CARDIOLOGY | Facility: HOSPITAL | Age: 74
DRG: 322 | End: 2024-09-14
Payer: MEDICARE

## 2024-09-14 DIAGNOSIS — I20.0 UNSTABLE ANGINA (MULTI): Primary | ICD-10-CM

## 2024-09-14 PROBLEM — R07.9 CHEST PAIN: Status: ACTIVE | Noted: 2024-09-14

## 2024-09-14 LAB
ALBUMIN SERPL BCP-MCNC: 4 G/DL (ref 3.4–5)
ALP SERPL-CCNC: 57 U/L (ref 33–136)
ALT SERPL W P-5'-P-CCNC: 19 U/L (ref 10–52)
ANION GAP SERPL CALC-SCNC: 11 MMOL/L (ref 10–20)
AST SERPL W P-5'-P-CCNC: 19 U/L (ref 9–39)
BASOPHILS # BLD AUTO: 0.04 X10*3/UL (ref 0–0.1)
BASOPHILS NFR BLD AUTO: 0.5 %
BILIRUB SERPL-MCNC: 0.6 MG/DL (ref 0–1.2)
BUN SERPL-MCNC: 21 MG/DL (ref 6–23)
CALCIUM SERPL-MCNC: 9.2 MG/DL (ref 8.6–10.3)
CARDIAC TROPONIN I PNL SERPL HS: 60 NG/L (ref 0–20)
CARDIAC TROPONIN I PNL SERPL HS: 63 NG/L (ref 0–20)
CHLORIDE SERPL-SCNC: 102 MMOL/L (ref 98–107)
CO2 SERPL-SCNC: 25 MMOL/L (ref 21–32)
CREAT SERPL-MCNC: 1.42 MG/DL (ref 0.5–1.3)
EGFRCR SERPLBLD CKD-EPI 2021: 53 ML/MIN/1.73M*2
EOSINOPHIL # BLD AUTO: 0.5 X10*3/UL (ref 0–0.4)
EOSINOPHIL NFR BLD AUTO: 6.7 %
ERYTHROCYTE [DISTWIDTH] IN BLOOD BY AUTOMATED COUNT: 12.4 % (ref 11.5–14.5)
GLUCOSE BLD MANUAL STRIP-MCNC: 121 MG/DL (ref 74–99)
GLUCOSE SERPL-MCNC: 107 MG/DL (ref 74–99)
HCT VFR BLD AUTO: 46.6 % (ref 41–52)
HGB BLD-MCNC: 16 G/DL (ref 13.5–17.5)
HOLD SPECIMEN: NORMAL
HOLD SPECIMEN: NORMAL
IMM GRANULOCYTES # BLD AUTO: 0.01 X10*3/UL (ref 0–0.5)
IMM GRANULOCYTES NFR BLD AUTO: 0.1 % (ref 0–0.9)
LYMPHOCYTES # BLD AUTO: 2.56 X10*3/UL (ref 0.8–3)
LYMPHOCYTES NFR BLD AUTO: 34.4 %
MAGNESIUM SERPL-MCNC: 1.98 MG/DL (ref 1.6–2.4)
MCH RBC QN AUTO: 31.4 PG (ref 26–34)
MCHC RBC AUTO-ENTMCNC: 34.3 G/DL (ref 32–36)
MCV RBC AUTO: 91 FL (ref 80–100)
MONOCYTES # BLD AUTO: 0.68 X10*3/UL (ref 0.05–0.8)
MONOCYTES NFR BLD AUTO: 9.1 %
NEUTROPHILS # BLD AUTO: 3.66 X10*3/UL (ref 1.6–5.5)
NEUTROPHILS NFR BLD AUTO: 49.2 %
NRBC BLD-RTO: 0 /100 WBCS (ref 0–0)
PLATELET # BLD AUTO: 203 X10*3/UL (ref 150–450)
POTASSIUM SERPL-SCNC: 4.2 MMOL/L (ref 3.5–5.3)
PROT SERPL-MCNC: 5.8 G/DL (ref 6.4–8.2)
RBC # BLD AUTO: 5.1 X10*6/UL (ref 4.5–5.9)
SODIUM SERPL-SCNC: 134 MMOL/L (ref 136–145)
WBC # BLD AUTO: 7.5 X10*3/UL (ref 4.4–11.3)

## 2024-09-14 PROCEDURE — G0378 HOSPITAL OBSERVATION PER HR: HCPCS

## 2024-09-14 PROCEDURE — 84484 ASSAY OF TROPONIN QUANT: CPT | Performed by: EMERGENCY MEDICINE

## 2024-09-14 PROCEDURE — 80053 COMPREHEN METABOLIC PANEL: CPT | Performed by: EMERGENCY MEDICINE

## 2024-09-14 PROCEDURE — 2500000004 HC RX 250 GENERAL PHARMACY W/ HCPCS (ALT 636 FOR OP/ED): Performed by: INTERNAL MEDICINE

## 2024-09-14 PROCEDURE — 2500000001 HC RX 250 WO HCPCS SELF ADMINISTERED DRUGS (ALT 637 FOR MEDICARE OP): Performed by: EMERGENCY MEDICINE

## 2024-09-14 PROCEDURE — 2500000001 HC RX 250 WO HCPCS SELF ADMINISTERED DRUGS (ALT 637 FOR MEDICARE OP): Performed by: INTERNAL MEDICINE

## 2024-09-14 PROCEDURE — 83735 ASSAY OF MAGNESIUM: CPT | Performed by: EMERGENCY MEDICINE

## 2024-09-14 PROCEDURE — 93005 ELECTROCARDIOGRAM TRACING: CPT

## 2024-09-14 PROCEDURE — 71045 X-RAY EXAM CHEST 1 VIEW: CPT | Performed by: RADIOLOGY

## 2024-09-14 PROCEDURE — 2500000002 HC RX 250 W HCPCS SELF ADMINISTERED DRUGS (ALT 637 FOR MEDICARE OP, ALT 636 FOR OP/ED): Performed by: INTERNAL MEDICINE

## 2024-09-14 PROCEDURE — 36415 COLL VENOUS BLD VENIPUNCTURE: CPT | Performed by: EMERGENCY MEDICINE

## 2024-09-14 PROCEDURE — 2500000004 HC RX 250 GENERAL PHARMACY W/ HCPCS (ALT 636 FOR OP/ED): Performed by: STUDENT IN AN ORGANIZED HEALTH CARE EDUCATION/TRAINING PROGRAM

## 2024-09-14 PROCEDURE — 99285 EMERGENCY DEPT VISIT HI MDM: CPT

## 2024-09-14 PROCEDURE — 71045 X-RAY EXAM CHEST 1 VIEW: CPT

## 2024-09-14 PROCEDURE — 99291 CRITICAL CARE FIRST HOUR: CPT | Performed by: STUDENT IN AN ORGANIZED HEALTH CARE EDUCATION/TRAINING PROGRAM

## 2024-09-14 PROCEDURE — 85025 COMPLETE CBC W/AUTO DIFF WBC: CPT | Performed by: EMERGENCY MEDICINE

## 2024-09-14 PROCEDURE — 82947 ASSAY GLUCOSE BLOOD QUANT: CPT

## 2024-09-14 PROCEDURE — 96372 THER/PROPH/DIAG INJ SC/IM: CPT | Performed by: STUDENT IN AN ORGANIZED HEALTH CARE EDUCATION/TRAINING PROGRAM

## 2024-09-14 PROCEDURE — 99223 1ST HOSP IP/OBS HIGH 75: CPT | Performed by: INTERNAL MEDICINE

## 2024-09-14 RX ORDER — ACETAMINOPHEN 325 MG/1
650 TABLET ORAL EVERY 4 HOURS PRN
Status: DISCONTINUED | OUTPATIENT
Start: 2024-09-14 | End: 2024-09-17 | Stop reason: HOSPADM

## 2024-09-14 RX ORDER — NITROGLYCERIN 0.4 MG/1
0.4 TABLET SUBLINGUAL EVERY 5 MIN PRN
Status: DISCONTINUED | OUTPATIENT
Start: 2024-09-14 | End: 2024-09-17 | Stop reason: HOSPADM

## 2024-09-14 RX ORDER — SODIUM CHLORIDE, SODIUM LACTATE, POTASSIUM CHLORIDE, CALCIUM CHLORIDE 600; 310; 30; 20 MG/100ML; MG/100ML; MG/100ML; MG/100ML
75 INJECTION, SOLUTION INTRAVENOUS CONTINUOUS
Status: DISCONTINUED | OUTPATIENT
Start: 2024-09-14 | End: 2024-09-17 | Stop reason: HOSPADM

## 2024-09-14 RX ORDER — TAMSULOSIN HYDROCHLORIDE 0.4 MG/1
0.4 CAPSULE ORAL DAILY
Status: DISCONTINUED | OUTPATIENT
Start: 2024-09-15 | End: 2024-09-17 | Stop reason: HOSPADM

## 2024-09-14 RX ORDER — DEXTROSE 50 % IN WATER (D50W) INTRAVENOUS SYRINGE
12.5
Status: DISCONTINUED | OUTPATIENT
Start: 2024-09-14 | End: 2024-09-17 | Stop reason: HOSPADM

## 2024-09-14 RX ORDER — ENOXAPARIN SODIUM 100 MG/ML
40 INJECTION SUBCUTANEOUS EVERY 24 HOURS
Status: DISCONTINUED | OUTPATIENT
Start: 2024-09-14 | End: 2024-09-17 | Stop reason: HOSPADM

## 2024-09-14 RX ORDER — NAPROXEN SODIUM 220 MG/1
243 TABLET, FILM COATED ORAL ONCE
Status: COMPLETED | OUTPATIENT
Start: 2024-09-14 | End: 2024-09-14

## 2024-09-14 RX ORDER — INSULIN LISPRO 100 [IU]/ML
0-20 INJECTION, SOLUTION INTRAVENOUS; SUBCUTANEOUS
Status: DISCONTINUED | OUTPATIENT
Start: 2024-09-15 | End: 2024-09-17 | Stop reason: HOSPADM

## 2024-09-14 RX ORDER — METOPROLOL SUCCINATE 25 MG/1
25 TABLET, EXTENDED RELEASE ORAL DAILY
Status: DISCONTINUED | OUTPATIENT
Start: 2024-09-15 | End: 2024-09-17 | Stop reason: HOSPADM

## 2024-09-14 RX ORDER — ACETAMINOPHEN 160 MG/5ML
650 SOLUTION ORAL EVERY 4 HOURS PRN
Status: DISCONTINUED | OUTPATIENT
Start: 2024-09-14 | End: 2024-09-17 | Stop reason: HOSPADM

## 2024-09-14 RX ORDER — ATORVASTATIN CALCIUM 40 MG/1
40 TABLET, FILM COATED ORAL NIGHTLY
Status: DISCONTINUED | OUTPATIENT
Start: 2024-09-14 | End: 2024-09-17 | Stop reason: HOSPADM

## 2024-09-14 RX ORDER — LEVOTHYROXINE SODIUM 112 UG/1
112 TABLET ORAL DAILY
Status: DISCONTINUED | OUTPATIENT
Start: 2024-09-15 | End: 2024-09-17 | Stop reason: HOSPADM

## 2024-09-14 RX ORDER — PANTOPRAZOLE SODIUM 40 MG/1
40 TABLET, DELAYED RELEASE ORAL
Status: DISCONTINUED | OUTPATIENT
Start: 2024-09-15 | End: 2024-09-17 | Stop reason: HOSPADM

## 2024-09-14 RX ORDER — DEXTROSE 50 % IN WATER (D50W) INTRAVENOUS SYRINGE
25
Status: DISCONTINUED | OUTPATIENT
Start: 2024-09-14 | End: 2024-09-17 | Stop reason: HOSPADM

## 2024-09-14 RX ORDER — NAPROXEN SODIUM 220 MG/1
81 TABLET, FILM COATED ORAL ONCE
Status: COMPLETED | OUTPATIENT
Start: 2024-09-14 | End: 2024-09-14

## 2024-09-14 RX ORDER — ACETAMINOPHEN 650 MG/1
650 SUPPOSITORY RECTAL EVERY 4 HOURS PRN
Status: DISCONTINUED | OUTPATIENT
Start: 2024-09-14 | End: 2024-09-17 | Stop reason: HOSPADM

## 2024-09-14 RX ORDER — ASPIRIN 81 MG/1
81 TABLET ORAL DAILY
Status: DISCONTINUED | OUTPATIENT
Start: 2024-09-15 | End: 2024-09-16 | Stop reason: ALTCHOICE

## 2024-09-14 RX ADMIN — ENOXAPARIN SODIUM 40 MG: 40 INJECTION SUBCUTANEOUS at 20:10

## 2024-09-14 RX ADMIN — ASPIRIN 81 MG CHEWABLE TABLET 243 MG: 81 TABLET CHEWABLE at 17:56

## 2024-09-14 RX ADMIN — SODIUM CHLORIDE, POTASSIUM CHLORIDE, SODIUM LACTATE AND CALCIUM CHLORIDE 75 ML/HR: 600; 310; 30; 20 INJECTION, SOLUTION INTRAVENOUS at 22:21

## 2024-09-14 RX ADMIN — ATORVASTATIN CALCIUM 40 MG: 40 TABLET, FILM COATED ORAL at 22:21

## 2024-09-14 RX ADMIN — ASPIRIN 81 MG CHEWABLE TABLET 81 MG: 81 TABLET CHEWABLE at 17:59

## 2024-09-14 RX ADMIN — NITROGLYCERIN 0.4 MG: 0.4 TABLET SUBLINGUAL at 17:55

## 2024-09-14 RX ADMIN — NITROGLYCERIN 0.5 INCH: 20 OINTMENT TOPICAL at 18:42

## 2024-09-14 RX ADMIN — TICAGRELOR 90 MG: 90 TABLET ORAL at 22:21

## 2024-09-14 SDOH — SOCIAL STABILITY: SOCIAL INSECURITY: ABUSE: ADULT

## 2024-09-14 SDOH — HEALTH STABILITY: MENTAL HEALTH: HOW OFTEN DO YOU HAVE 6 OR MORE DRINKS ON ONE OCCASION?: NEVER

## 2024-09-14 SDOH — ECONOMIC STABILITY: HOUSING INSECURITY: AT ANY TIME IN THE PAST 12 MONTHS, WERE YOU HOMELESS OR LIVING IN A SHELTER (INCLUDING NOW)?: NO

## 2024-09-14 SDOH — HEALTH STABILITY: MENTAL HEALTH: HOW MANY STANDARD DRINKS CONTAINING ALCOHOL DO YOU HAVE ON A TYPICAL DAY?: PATIENT DOES NOT DRINK

## 2024-09-14 SDOH — ECONOMIC STABILITY: TRANSPORTATION INSECURITY
IN THE PAST 12 MONTHS, HAS LACK OF TRANSPORTATION KEPT YOU FROM MEETINGS, WORK, OR FROM GETTING THINGS NEEDED FOR DAILY LIVING?: NO

## 2024-09-14 SDOH — HEALTH STABILITY: MENTAL HEALTH: HOW OFTEN DO YOU HAVE A DRINK CONTAINING ALCOHOL?: NEVER

## 2024-09-14 SDOH — SOCIAL STABILITY: SOCIAL INSECURITY: HAS ANYONE EVER THREATENED TO HURT YOUR FAMILY OR YOUR PETS?: NO

## 2024-09-14 SDOH — SOCIAL STABILITY: SOCIAL INSECURITY: HAVE YOU HAD THOUGHTS OF HARMING ANYONE ELSE?: NO

## 2024-09-14 SDOH — ECONOMIC STABILITY: TRANSPORTATION INSECURITY
IN THE PAST 12 MONTHS, HAS THE LACK OF TRANSPORTATION KEPT YOU FROM MEDICAL APPOINTMENTS OR FROM GETTING MEDICATIONS?: NO

## 2024-09-14 SDOH — ECONOMIC STABILITY: INCOME INSECURITY: IN THE LAST 12 MONTHS, WAS THERE A TIME WHEN YOU WERE NOT ABLE TO PAY THE MORTGAGE OR RENT ON TIME?: NO

## 2024-09-14 SDOH — SOCIAL STABILITY: SOCIAL INSECURITY: ARE YOU OR HAVE YOU BEEN THREATENED OR ABUSED PHYSICALLY, EMOTIONALLY, OR SEXUALLY BY ANYONE?: NO

## 2024-09-14 SDOH — SOCIAL STABILITY: SOCIAL INSECURITY: DOES ANYONE TRY TO KEEP YOU FROM HAVING/CONTACTING OTHER FRIENDS OR DOING THINGS OUTSIDE YOUR HOME?: NO

## 2024-09-14 SDOH — SOCIAL STABILITY: SOCIAL INSECURITY: ARE THERE ANY APPARENT SIGNS OF INJURIES/BEHAVIORS THAT COULD BE RELATED TO ABUSE/NEGLECT?: NO

## 2024-09-14 SDOH — SOCIAL STABILITY: SOCIAL INSECURITY: HAVE YOU HAD ANY THOUGHTS OF HARMING ANYONE ELSE?: NO

## 2024-09-14 SDOH — SOCIAL STABILITY: SOCIAL INSECURITY: DO YOU FEEL UNSAFE GOING BACK TO THE PLACE WHERE YOU ARE LIVING?: NO

## 2024-09-14 SDOH — SOCIAL STABILITY: SOCIAL INSECURITY: WERE YOU ABLE TO COMPLETE ALL THE BEHAVIORAL HEALTH SCREENINGS?: YES

## 2024-09-14 SDOH — ECONOMIC STABILITY: HOUSING INSECURITY: IN THE PAST 12 MONTHS, HOW MANY TIMES HAVE YOU MOVED WHERE YOU WERE LIVING?: 0

## 2024-09-14 SDOH — ECONOMIC STABILITY: INCOME INSECURITY: HOW HARD IS IT FOR YOU TO PAY FOR THE VERY BASICS LIKE FOOD, HOUSING, MEDICAL CARE, AND HEATING?: NOT HARD AT ALL

## 2024-09-14 SDOH — SOCIAL STABILITY: SOCIAL INSECURITY: DO YOU FEEL ANYONE HAS EXPLOITED OR TAKEN ADVANTAGE OF YOU FINANCIALLY OR OF YOUR PERSONAL PROPERTY?: NO

## 2024-09-14 ASSESSMENT — PAIN SCALES - GENERAL
PAINLEVEL_OUTOF10: 4
PAINLEVEL_OUTOF10: 1
PAINLEVEL_OUTOF10: 0 - NO PAIN

## 2024-09-14 ASSESSMENT — PAIN DESCRIPTION - PAIN TYPE
TYPE: ACUTE PAIN

## 2024-09-14 ASSESSMENT — ACTIVITIES OF DAILY LIVING (ADL)
DRESSING YOURSELF: INDEPENDENT
HEARING - RIGHT EAR: HEARING AID
BATHING: INDEPENDENT
ADEQUATE_TO_COMPLETE_ADL: YES
ASSISTIVE_DEVICE: CANE;WALKER;WHEELCHAIR;SHOWER CHAIR
PATIENT'S MEMORY ADEQUATE TO SAFELY COMPLETE DAILY ACTIVITIES?: YES
JUDGMENT_ADEQUATE_SAFELY_COMPLETE_DAILY_ACTIVITIES: YES
HEARING - LEFT EAR: HEARING AID
TOILETING: INDEPENDENT
FEEDING YOURSELF: INDEPENDENT
GROOMING: INDEPENDENT
WALKS IN HOME: INDEPENDENT

## 2024-09-14 ASSESSMENT — COGNITIVE AND FUNCTIONAL STATUS - GENERAL
PATIENT BASELINE BEDBOUND: NO
MOBILITY SCORE: 24
DAILY ACTIVITIY SCORE: 24

## 2024-09-14 ASSESSMENT — PAIN DESCRIPTION - LOCATION
LOCATION: CHEST

## 2024-09-14 ASSESSMENT — PAIN DESCRIPTION - DESCRIPTORS
DESCRIPTORS: ACHING

## 2024-09-14 ASSESSMENT — LIFESTYLE VARIABLES
PRESCIPTION_ABUSE_PAST_12_MONTHS: NO
SKIP TO QUESTIONS 9-10: 1
AUDIT-C TOTAL SCORE: 0
HOW MANY STANDARD DRINKS CONTAINING ALCOHOL DO YOU HAVE ON A TYPICAL DAY: PATIENT DOES NOT DRINK
HOW OFTEN DO YOU HAVE 6 OR MORE DRINKS ON ONE OCCASION: NEVER
SKIP TO QUESTIONS 9-10: 1
SUBSTANCE_ABUSE_PAST_12_MONTHS: NO
AUDIT-C TOTAL SCORE: 0
HOW OFTEN DO YOU HAVE A DRINK CONTAINING ALCOHOL: NEVER
AUDIT-C TOTAL SCORE: 0

## 2024-09-14 ASSESSMENT — PATIENT HEALTH QUESTIONNAIRE - PHQ9
2. FEELING DOWN, DEPRESSED OR HOPELESS: NOT AT ALL
1. LITTLE INTEREST OR PLEASURE IN DOING THINGS: NOT AT ALL
SUM OF ALL RESPONSES TO PHQ9 QUESTIONS 1 & 2: 0

## 2024-09-14 ASSESSMENT — PAIN DESCRIPTION - ORIENTATION
ORIENTATION: LEFT

## 2024-09-14 ASSESSMENT — COLUMBIA-SUICIDE SEVERITY RATING SCALE - C-SSRS
1. IN THE PAST MONTH, HAVE YOU WISHED YOU WERE DEAD OR WISHED YOU COULD GO TO SLEEP AND NOT WAKE UP?: NO
2. HAVE YOU ACTUALLY HAD ANY THOUGHTS OF KILLING YOURSELF?: NO
6. HAVE YOU EVER DONE ANYTHING, STARTED TO DO ANYTHING, OR PREPARED TO DO ANYTHING TO END YOUR LIFE?: NO

## 2024-09-14 ASSESSMENT — PAIN - FUNCTIONAL ASSESSMENT
PAIN_FUNCTIONAL_ASSESSMENT: 0-10

## 2024-09-14 ASSESSMENT — HEART SCORE
RISK FACTORS: >2 RISK FACTORS OR HX OF ATHEROSCLEROTIC DISEASE
TROPONIN: GREATER THAN OR EQUAL TO 3 TIMES NORMAL LIMIT
ECG: NORMAL
HISTORY: MODERATELY SUSPICIOUS
AGE: 65+
HEART SCORE: 7

## 2024-09-14 NOTE — Clinical Note
Vessel: circumflex (1st marginal). Stent inserted. Inflation 1: Pressure = 12 susan; Duration = 30 sec. STENT DEPLOYED

## 2024-09-14 NOTE — ED PROVIDER NOTES
HPI   Chief Complaint   Patient presents with    Chest Pain     Patient to ED via wheelchair with c/o chest pain, onset yesterday but worse today. Describes as aching discomfort. + intermittent shortness of breath. + nausea without vomiting yesterday, denies nausea today. Denies diaphoresis. Patient had cardiac cath on 9/10 with stent placement per Dr. Dixon. Scheduled to return on November 30 for additional stent placement.       Limitations to History: None    HPI: 74-year-old male presents with concern for chest pain.  Had some slight chest pain yesterday.  Is worsened today.  Aching in his left chest.  Worsened with exertion.  Mild shortness of breath.  Denies any nausea, vomiting, diaphoresis.  Patient discharged from the hospital 3 days ago following PCI resulting in single drug-eluting stent to LAD.  Denies any fever, chills, cough, abdominal pain, fall or trauma.    Additional History Obtained from: Wife at the bedside.    ------------------------------------------------------------------------------------------------------------------------------------------  Physical Exam:    VS: As documented in the triage note and EMR flowsheet from this visit were reviewed.    Appearance: Alert. cooperative,  in no acute distress.   Skin: Intact,  dry skin, no lesions, rash, petechiae or purpura.   Eyes: PERRLA, EOMs intact,  Conjunctiva pink with no redness or exudates.   HENT: Normocephalic, atraumatic. Nares patent. No intraoral lesions.   Neck: Supple, without meningismus. Trachea at midline. No lymphadenopathy.  Pulmonary: Clear bilaterally with good chest wall excursion. No rales, rhonchi or wheezing. No accessory muscle use or stridor.  Cardiac: Regular rate and rhythm, no rubs, murmurs, or gallops.   Abdomen: Abdomen is soft, nontender, and nondistended.  No palpable organomegaly.  No rebound or guarding.  No CVA tenderness. Nonsurgical abdomen.  Genitourinary: Exam deferred.  Musculoskeletal: Full range of  motion.  Pulses full and equal. No cyanosis, clubbing, or edema.  Neurological:  Cranial nerves are grossly intact, grossly normal sensation, no weakness, no focal findings identified.  Psychiatric: Appropriate mood and affect.                Patient History   Past Medical History:   Diagnosis Date    Coronary artery disease     Diabetes mellitus (Multi)     Disease of thyroid gland     Hyperlipidemia     Hypertension      Past Surgical History:   Procedure Laterality Date    CARDIAC CATHETERIZATION N/A 9/10/2024    Procedure: Left Heart Cath, With LV;  Surgeon: Tyshawn Dixon MD;  Location: [facility] Cardiac Cath Lab;  Service: Cardiovascular;  Laterality: N/A;    CARDIAC CATHETERIZATION N/A 9/10/2024    Procedure: PCI XOCHILT Stent- Coronary;  Surgeon: Tyshawn Dixon MD;  Location: [facility] Cardiac Cath Lab;  Service: Cardiovascular;  Laterality: N/A;    CARDIAC CATHETERIZATION N/A 9/10/2024    Procedure: IVUS - Coronary;  Surgeon: Tyshawn Dixon MD;  Location: [facility] Cardiac Cath Lab;  Service: Cardiovascular;  Laterality: N/A;     No family history on file.  Social History     Tobacco Use    Smoking status: Former     Current packs/day: 0.00     Types: Cigarettes     Quit date:      Years since quittin.7    Smokeless tobacco: Never   Substance Use Topics    Alcohol use: Never    Drug use: Never       Physical Exam   ED Triage Vitals [24 1722]   Temperature Heart Rate Respirations BP   36.2 °C (97.1 °F) 75 18 133/63      Pulse Ox Temp Source Heart Rate Source Patient Position   95 % Oral Monitor Sitting      BP Location FiO2 (%)     Left arm --       Physical Exam      ED Course & MDM   Diagnoses as of 24 1837   Unstable angina (Multi)                 No data recorded     Raul Coma Scale Score: 15 (24 1724 : Ewa Samayoa RN)                           Medical Decision Making  Labs Reviewed  CBC WITH AUTO DIFFERENTIAL - Abnormal     WBC                            7.5                    nRBC                          0.0                    RBC                           5.10                   Hemoglobin                    16.0                   Hematocrit                    46.6                   MCV                           91                     MCH                           31.4                   MCHC                          34.3                   RDW                           12.4                   Platelets                     203                    Neutrophils %                 49.2                   Immature Granulocytes %, Automated   0.1                    Lymphocytes %                 34.4                   Monocytes %                   9.1                    Eosinophils %                 6.7                    Basophils %                   0.5                    Neutrophils Absolute          3.66                   Immature Granulocytes Absolute, Au*   0.01                   Lymphocytes Absolute          2.56                   Monocytes Absolute            0.68                   Eosinophils Absolute          0.50 (*)               Basophils Absolute            0.04                COMPREHENSIVE METABOLIC PANEL - Abnormal     Glucose                       107 (*)                Sodium                        134 (*)                Potassium                     4.2                    Chloride                      102                    Bicarbonate                   25                     Anion Gap                     11                     Urea Nitrogen                 21                     Creatinine                    1.42 (*)               eGFR                          53 (*)                 Calcium                       9.2                    Albumin                       4.0                    Alkaline Phosphatase          57                     Total Protein                 5.8 (*)                AST                           19                     Bilirubin,  Total              0.6                    ALT                           19                  SERIAL TROPONIN-INITIAL - Abnormal     Troponin I, High Sensitivity   60 (*)                     Narrative: Less than 99th percentile of normal range cutoff-                  Female and children under 18 years old <14 ng/L; Male <21 ng/L: Negative                  Repeat testing should be performed if clinically indicated.                                     Female and children under 18 years old 14-50 ng/L; Male 21-50 ng/L:                  Consistent with possible cardiac damage and possible increased clinical                   risk. Serial measurements may help to assess extent of myocardial damage.                                     >50 ng/L: Consistent with cardiac damage, increased clinical risk and                  myocardial infarction. Serial measurements may help assess extent of                   myocardial damage.                                      NOTE: Children less than 1 year old may have higher baseline troponin                   levels and results should be interpreted in conjunction with the overall                   clinical context.                                     NOTE: Troponin I testing is performed using a different                   testing methodology at [facility] than at other                   [facility]. Direct result comparisons should only                   be made within the same method.  MAGNESIUM - Normal     Magnesium                     1.98                TROPONIN SERIES- (INITIAL, 1 HR)         Narrative: The following orders were created for panel order Troponin I Series, High Sensitivity (0, 1 HR).                  Procedure                               Abnormality         Status                                     ---------                               -----------         ------                                     Troponin I, High Sensiti...[[id number]]   Abnormal            Final result                               Troponin, High Sensitivi...[[id number]]                                                                                   Please view results for these tests on the individual orders.  SERIAL TROPONIN, 1 HOUR  XR chest 1 view   Final Result    No active disease in the chest.          MACRO:    None          Signed by: Venanciocristi Montiel 2024 5:40 PM    Dictation workstation:   [id number]     Medical Decision Makin-year-old male who was recently discharged after stenting to his LAD. Had evidence of 90% occlusion of his obtuse marginal artery. Plan for recatheterization at a later date. Patient began having chest pain last night into this morning. EKG nonischemic. Troponin of 60. Patient treated with aspirin. Sublingual nitroglycerin relieved his resting chest pain. Patient had Nitropatch placed. Case discussed with Dr. Cooper who advised admission for cardiac catheterization on Monday. Advised against heparin. Advised to continue aspirin and Brilinta.  Case discussed with hospitalist who is agreeable with admission.  Patient admitted in stable condition.    Differential Diagnoses Considered: ACS, unstable angina, electrolyte abnormality    Independent Interpretation of Studies:  I independently interpreted: Chest x-ray shows no evidence of pneumonia or pneumothorax.    Escalation of Care:  Appropriate for admission for further treatment and evaluation.    Discussion of Management with Other Providers:   I discussed the patient/results with: Admitting hospitalist.  Cardiologist on-call, Dr. Dixon.    Chronic Medical Conditions Significantly Affecting Care: Coronary artery disease.    External Records Reviewed: I reviewed recent and relevant outside records including: Previous cardiac catheterization report which shows evidence of 90% occlusion of the obtuse marginal artery.          Procedure  ECG 12 lead    Performed by: Marck Ortiz,    Authorized by: Marck Martin DO    ECG interpreted by ED Physician in the absence of a cardiologist: yes    Comments:      EKG interpreted by Dr. Marck Martin: Normal sinus rhythm at 78 bpm.  WI interval 194 ms.  QTc of 435 ms.       Marck Martin DO  09/14/24 1838

## 2024-09-14 NOTE — Clinical Note
Angioplasty of the ramus lesion. Inflation 1: Pressure = 8 susan; Duration = 12 sec. Inflation 2: Pressure = 12 susan; Duration = 12 sec. Inflation 3: Pressure = 12 susan; Duration = 12 sec. Inflation 4: Pressure = 15 susan; Duration = 12 sec.

## 2024-09-14 NOTE — Clinical Note
Closure device placed in the right radial artery. Site closed by radial compression system. 10cc of air / 1+ PULSE

## 2024-09-14 NOTE — Clinical Note
Angioplasty of the ramus lesion. Inflation 1: Pressure = 8 susan; Duration = 14 sec. Inflation 2: Pressure = 10 susan; Duration = 10 sec. Inflation 3: Pressure = 8 susan; Duration = 13 sec. Inflation 4: Pressure = 10 susan; Duration = 10 sec.

## 2024-09-14 NOTE — Clinical Note
Angioplasty of the marginal circumflex lesion. Inflation 1: Pressure = 12 susan; Duration = 17 sec. Inflation 2: Pressure = 10 susan; Duration = 20 sec. Inflation 3: Pressure = 12 susan; Duration = 12 sec.

## 2024-09-14 NOTE — CONSULTS
"Inpatient consult to Cardiology  Consult performed by: Tyshawn Dixon MD  Consult ordered by: Marck Ortiz DO  Reason for consult: Unstable angina      History Of Present Illness:    Mr. Gael Burnette is a 74 y.o. former smoker diabetic male being consulted by the Cardiology team for chest pain. Patient with prior medical history significant for CAD S/P recent PCI to LAD (and residual lesion to OM), hypertension, diabetes mellitus, hyperlipidemia, hypothyroidism. He presented to ED [facility] on 09/14/2024 complaining of chest pain. Patient states that he had left sided chest pain last week and underwent successful PCI to LAD, with remaining lesion to OM that was scheduled for treatment as outpatient. However, due to new onset chest pain, we have opted to admit the patient and to schedule the PCI to the remaining lesion as inpatient. Initial troponin was 60. Labwork otherwise unremarkable. EKG showed sinus rhythm with incomplete RBBB and non-specific ST-T changes. He was given nitroglycerin with improvement in his pain. Patient was admitted for clinical compensation.      Last Recorded Vitals:  Vitals:    09/14/24 1722 09/14/24 1755 09/14/24 1759 09/14/24 1830   BP: 133/63 118/62 106/59 116/67   BP Location: Left arm  Left arm Left arm   Patient Position: Sitting  Lying Lying   Pulse: 75 68 77 66   Resp: 18  17 14   Temp: 36.2 °C (97.1 °F)      TempSrc: Oral      SpO2: 95%  (!) 93% 97%   Weight: 87.1 kg (192 lb)      Height: 1.803 m (5' 11\")          Last Labs:  CBC - 9/14/2024:  5:27 PM  7.5 16.0 203    46.6      CMP - 9/14/2024:  5:27 PM  9.2 5.8 19 --- 0.6   _ 4.0 19 57      PTT - 9/9/2024: 11:06 AM  0.9   10.7 31     Troponin I, High Sensitivity   Date/Time Value Ref Range Status   09/14/2024 05:27 PM 60 (HH) 0 - 20 ng/L Final   09/09/2024 12:17 PM 54 (HH) 0 - 20 ng/L Final     Comment:     Previous result verified on 9/9/2024 1214 on specimen/case [id number] called with component " [facility] for procedure Troponin I, High Sensitivity, Initial with value 65 ng/L.   09/09/2024 11:07 AM 65 (HH) 0 - 20 ng/L Final     BNP   Date/Time Value Ref Range Status   09/09/2024 11:07 AM 54 0 - 99 pg/mL Final     Hemoglobin A1C   Date/Time Value Ref Range Status   06/18/2024 10:56 AM 7.4 (H) 4.3 - 5.6 % Final     Comment:     American Diabetes Association guidelines indicate that patients with HgbA1c in the range 5.7-6.4% are at increased risk for development of diabetes, and intervention by lifestyle modification may be beneficial. HgbA1c greater or equal to 6.5% is considered diagnostic of diabetes.   12/11/2023 09:48 AM 6.8 (H) 4.3 - 5.6 % Final     Comment:     American Diabetes Association guidelines indicate that patients with HgbA1c in the range 5.7-6.4% are at increased risk for development of diabetes, and intervention by lifestyle modification may be beneficial. HgbA1c greater or equal to 6.5% is considered diagnostic of diabetes.     LDL Calculated   Date/Time Value Ref Range Status   09/09/2024 02:50 PM 59 <=99 mg/dL Final     Comment:                                 Near   Borderline      AGE      Desirable  Optimal    High     High     Very High     0-19 Y     0 - 109     ---    110-129   >/= 130     ----    20-24 Y     0 - 119     ---    120-159   >/= 160     ----      >24 Y     0 -  99   100-129  130-159   160-189     >/=190       VLDL   Date/Time Value Ref Range Status   09/09/2024 02:50 PM 60 (H) 0 - 40 mg/dL Final      Last I/O:  No intake/output data recorded.    Past Cardiology Tests (Last 3 Years):  EKG:  ECG 12 lead 09/14/2024 (Wet Read)      ECG 12 Lead 09/10/2024      ECG 12 lead 09/09/2024 (Wet Read)      ECG 12 lead 09/09/2024 (Preliminary)    Echo:  Transthoracic Echo (TTE) Complete 09/10/2024    Ejection Fractions:  EF   Date/Time Value Ref Range Status   09/10/2024 07:37 AM 60 %      Cath:  Cardiac Catheterization Procedure 09/10/2024    Stress Test:  No results found for this or  any previous visit from the past 1095 days.    Cardiac Imaging:  No results found for this or any previous visit from the past 1095 days.      Past Medical History:  He has a past medical history of Coronary artery disease, Diabetes mellitus (Multi), Disease of thyroid gland, Hyperlipidemia, and Hypertension.    Past Surgical History:  He has a past surgical history that includes Cardiac catheterization (N/A, 9/10/2024); Cardiac catheterization (N/A, 9/10/2024); and Cardiac catheterization (N/A, 9/10/2024).      Social History:  He reports that he quit smoking about 43 years ago. His smoking use included cigarettes. He has never used smokeless tobacco. He reports that he does not drink alcohol and does not use drugs.    Family History:  No family history on file.     Allergies:  Patient has no known allergies.    Inpatient Medications:  Scheduled medications   Medication Dose Route Frequency    nitroglycerin  0.5 inch transdermal Once     PRN medications   Medication    nitroglycerin     Continuous Medications   Medication Dose Last Rate     Outpatient Medications:  Current Outpatient Medications   Medication Instructions    ascorbic acid (VITAMIN C) 1,000 mg, oral, Daily    aspirin 81 mg, oral, Daily    atorvastatin (LIPITOR) 40 mg, oral, Nightly    b complex 0.4 mg tablet 1 tablet, oral, Daily    cinnamon (CINNAMON) 1,000 mg, oral, 2 times daily    empagliflozin (JARDIANCE) 25 mg    glipiZIDE (GLUCOTROL) 5 mg, oral, 2 times daily before meals    glucosamine-chondroitin (Osteo Bi-Flex) 250-200 mg tablet 1 tablet, oral, Every morning    levothyroxine (SYNTHROID, LEVOXYL) 112 mcg, oral, Daily, Take on an empty stomach at the same time each day, either 30 to 60 minutes prior to breakfast    lisinopril 5 mg, oral, Daily    metoprolol succinate XL (TOPROL-XL) 25 mg, oral, Daily, Do not crush or chew.    multivitamin tablet 1 tablet, oral, Daily    omega 3-dha-epa-fish oil (Fish OiL) 1,200 (144-216) mg capsule oral     omeprazole (PRILOSEC) 40 mg, oral, Daily before breakfast, Do not crush or chew.    SITagliptin phos-metformin (Janumet) 50-1,000 mg tablet 1 tablet, oral, 2 times daily (morning and late afternoon)    tamsulosin (FLOMAX) 0.4 mg, oral, Daily    testosterone cypionate (DEPO-TESTOSTERONE) 200 mg, intramuscular, Every 14 days    ticagrelor (BRILINTA) 90 mg, oral, 2 times daily    triamcinolone (Kenalog) 0.1 % cream Topical, Daily PRN       Physical Exam:  General: alert, oriented and in no acute distress  HEENT: NC/AT; EOMI; PERRLA, external ear is normal  Neck: supple; trachea midline; no masses; no JVD  Chest: clear breath sounds bilaterally; no wheezing  Cardio: regular rhythm, S1S2 normal, no murmurs  Abdomen: Soft, non-tender, non-distension, no organomegaly  Extremities: no clubbing/cyanosis/edema. Good healing R radial access site  Neuro: Grossly intact     Psychiatric: Normal mood and affect      Assessment/Plan     Mr. Gael Burnette is a 74 y.o. former smoker diabetic male being consulted by the Cardiology team for chest pain. Patient with prior medical history significant for CAD S/P recent PCI to LAD (and residual lesion to OM), hypertension, diabetes mellitus, hyperlipidemia, hypothyroidism. He presented to ED [facility] on 09/14/2024 complaining of chest pain. Patient states that he had left sided chest pain last week and underwent successful PCI to LAD, with remaining lesion to OM that was scheduled for treatment as outpatient. However, due to new onset chest pain, we have opted to admit the patient and to schedule the PCI to the remaining lesion as inpatient. Initial troponin was 60. Labwork otherwise unremarkable. EKG showed sinus rhythm with incomplete RBBB and non-specific ST-T changes. He was given nitroglycerin with improvement in his pain. Patient was admitted for clinical compensation.     Assessment    # Unstable angina  - Troponin 60.   - EKG showed  sinus rhythm with incomplete RBBB and  non-specific ST-T changes.  - We had a thorough conversation with the patient about the natural history of atherosclerosis and the importance for commitment with healthy lifestyle, including but not limited to healthy diet, regular exercises, avoid sedentary and compliance to medication.   - Left Heart Catheterization (09/10/2024):  Double vessel Coronary Artery Disease  Mid LAD 95% calcified lesion - bifurcation with 1st Dg (70% ostial lesion) and 2nd Dg (50% ostial lesion)   Mid 1st OM 90% calcified lesion  RCA with irregularities  Preserved LV systolic function  S/P Successful PCI to mid LAD (bifurcation with 1st and 2nd Dg) using one XOCHILT, guided by IVUS  - Therefore we will schedule PCI to remaining coronary lesions as inpatient.  - The natural history of atherosclerosis was discussed with the patient. The treatment options including GDMT, percutaneous intervention or surgical intervention were discussed with the patient. All the risks, benefits and alternative procedures were discussed. Complications of the procedure were also discussed, including but not limited to risk of bleeding, stroke, infarct, infection, urgent cardiac surgery or death. All questions were answered and the informed consent was obtained. After aforementioned testing and consultation, Left Heart Catheterization with potential Percutaneous Coronary Intervention would be a reasonable approach if the patient is candidate.  - Please keep NPO on Sunday after 11pm for PCI on Monday 09/16 by morning.  - Keep ASA; Ticagrelor; Atorvastatin 40mg daily; keep ACE-I.  - Keep Metoprolol succinate 25mg daily.  - Follow up with Cardiology clinic upon discharge.     # Hypertension  - Controlled blood pressure.  - Keep current medications including lisinopril 5mg daily.  - Patient counseled to keep a healthy lifestyle including regular exercise and low-sodium diet.  - Recommended home blood pressure monitoring.  - Goal of BP < 130/80mmHg.     # Diabetes  -  Counseled on healthy diet and regular exercises.  - Discussed need for weight loss and the benefits.   - Keep current medications including empaglifozin, glipizide.  - ISS.     # Hyperlipidemia  - Keep home medication with Atorvastatin 40mg daily.  - Counseled on healthy diet and regular exercise.      # Hypothyroidism  - Keep Levothyroxine 112mcg daily      Patient seen in the ER. This critically ill patient continues to be at-risk for clinically significant deterioration / failure due to the above mentioned dysfunctional, unstable organ systems.  I have personally identified and managed all complex critical care issues to prevent aforementioned clinical deterioration.  Critical care time is spent at bedside and/or the immediate area and has included, but is not limited to, the review of diagnostic tests, labs, radiographs, serial assessments of hemodynamics, respiratory status, ventilatory management, and family updates.  Time spent in procedures and teaching are reported separately.     Critical care time: 65 minutes    Peripheral IV 09/14/24 20 G Right Antecubital (Active)   Site Assessment Clean;Dry;Intact 09/14/24 1752   Dressing Type Transparent 09/14/24 1752   Line Status Blood return noted;Flushed;Saline locked 09/14/24 1752   Dressing Status Clean;Dry 09/14/24 1752   Number of days: 0       Code Status:  Full Code    Tyshawn Dixon MD  Cardiology

## 2024-09-14 NOTE — H&P (VIEW-ONLY)
"Inpatient consult to Cardiology  Consult performed by: Tyshawn Dixon MD  Consult ordered by: Marck Ortiz DO  Reason for consult: Unstable angina      History Of Present Illness:    Mr. Gael Burnette is a 74 y.o. former smoker diabetic male being consulted by the Cardiology team for chest pain. Patient with prior medical history significant for CAD S/P recent PCI to LAD (and residual lesion to OM), hypertension, diabetes mellitus, hyperlipidemia, hypothyroidism. He presented to ED [facility] on 09/14/2024 complaining of chest pain. Patient states that he had left sided chest pain last week and underwent successful PCI to LAD, with remaining lesion to OM that was scheduled for treatment as outpatient. However, due to new onset chest pain, we have opted to admit the patient and to schedule the PCI to the remaining lesion as inpatient. Initial troponin was 60. Labwork otherwise unremarkable. EKG showed sinus rhythm with incomplete RBBB and non-specific ST-T changes. He was given nitroglycerin with improvement in his pain. Patient was admitted for clinical compensation.      Last Recorded Vitals:  Vitals:    09/14/24 1722 09/14/24 1755 09/14/24 1759 09/14/24 1830   BP: 133/63 118/62 106/59 116/67   BP Location: Left arm  Left arm Left arm   Patient Position: Sitting  Lying Lying   Pulse: 75 68 77 66   Resp: 18  17 14   Temp: 36.2 °C (97.1 °F)      TempSrc: Oral      SpO2: 95%  (!) 93% 97%   Weight: 87.1 kg (192 lb)      Height: 1.803 m (5' 11\")          Last Labs:  CBC - 9/14/2024:  5:27 PM  7.5 16.0 203    46.6      CMP - 9/14/2024:  5:27 PM  9.2 5.8 19 --- 0.6   _ 4.0 19 57      PTT - 9/9/2024: 11:06 AM  0.9   10.7 31     Troponin I, High Sensitivity   Date/Time Value Ref Range Status   09/14/2024 05:27 PM 60 (HH) 0 - 20 ng/L Final   09/09/2024 12:17 PM 54 (HH) 0 - 20 ng/L Final     Comment:     Previous result verified on 9/9/2024 1214 on specimen/case [id number] called with component " [facility] for procedure Troponin I, High Sensitivity, Initial with value 65 ng/L.   09/09/2024 11:07 AM 65 (HH) 0 - 20 ng/L Final     BNP   Date/Time Value Ref Range Status   09/09/2024 11:07 AM 54 0 - 99 pg/mL Final     Hemoglobin A1C   Date/Time Value Ref Range Status   06/18/2024 10:56 AM 7.4 (H) 4.3 - 5.6 % Final     Comment:     American Diabetes Association guidelines indicate that patients with HgbA1c in the range 5.7-6.4% are at increased risk for development of diabetes, and intervention by lifestyle modification may be beneficial. HgbA1c greater or equal to 6.5% is considered diagnostic of diabetes.   12/11/2023 09:48 AM 6.8 (H) 4.3 - 5.6 % Final     Comment:     American Diabetes Association guidelines indicate that patients with HgbA1c in the range 5.7-6.4% are at increased risk for development of diabetes, and intervention by lifestyle modification may be beneficial. HgbA1c greater or equal to 6.5% is considered diagnostic of diabetes.     LDL Calculated   Date/Time Value Ref Range Status   09/09/2024 02:50 PM 59 <=99 mg/dL Final     Comment:                                 Near   Borderline      AGE      Desirable  Optimal    High     High     Very High     0-19 Y     0 - 109     ---    110-129   >/= 130     ----    20-24 Y     0 - 119     ---    120-159   >/= 160     ----      >24 Y     0 -  99   100-129  130-159   160-189     >/=190       VLDL   Date/Time Value Ref Range Status   09/09/2024 02:50 PM 60 (H) 0 - 40 mg/dL Final      Last I/O:  No intake/output data recorded.    Past Cardiology Tests (Last 3 Years):  EKG:  ECG 12 lead 09/14/2024 (Wet Read)      ECG 12 Lead 09/10/2024      ECG 12 lead 09/09/2024 (Wet Read)      ECG 12 lead 09/09/2024 (Preliminary)    Echo:  Transthoracic Echo (TTE) Complete 09/10/2024    Ejection Fractions:  EF   Date/Time Value Ref Range Status   09/10/2024 07:37 AM 60 %      Cath:  Cardiac Catheterization Procedure 09/10/2024    Stress Test:  No results found for this or  any previous visit from the past 1095 days.    Cardiac Imaging:  No results found for this or any previous visit from the past 1095 days.      Past Medical History:  He has a past medical history of Coronary artery disease, Diabetes mellitus (Multi), Disease of thyroid gland, Hyperlipidemia, and Hypertension.    Past Surgical History:  He has a past surgical history that includes Cardiac catheterization (N/A, 9/10/2024); Cardiac catheterization (N/A, 9/10/2024); and Cardiac catheterization (N/A, 9/10/2024).      Social History:  He reports that he quit smoking about 43 years ago. His smoking use included cigarettes. He has never used smokeless tobacco. He reports that he does not drink alcohol and does not use drugs.    Family History:  No family history on file.     Allergies:  Patient has no known allergies.    Inpatient Medications:  Scheduled medications   Medication Dose Route Frequency    nitroglycerin  0.5 inch transdermal Once     PRN medications   Medication    nitroglycerin     Continuous Medications   Medication Dose Last Rate     Outpatient Medications:  Current Outpatient Medications   Medication Instructions    ascorbic acid (VITAMIN C) 1,000 mg, oral, Daily    aspirin 81 mg, oral, Daily    atorvastatin (LIPITOR) 40 mg, oral, Nightly    b complex 0.4 mg tablet 1 tablet, oral, Daily    cinnamon (CINNAMON) 1,000 mg, oral, 2 times daily    empagliflozin (JARDIANCE) 25 mg    glipiZIDE (GLUCOTROL) 5 mg, oral, 2 times daily before meals    glucosamine-chondroitin (Osteo Bi-Flex) 250-200 mg tablet 1 tablet, oral, Every morning    levothyroxine (SYNTHROID, LEVOXYL) 112 mcg, oral, Daily, Take on an empty stomach at the same time each day, either 30 to 60 minutes prior to breakfast    lisinopril 5 mg, oral, Daily    metoprolol succinate XL (TOPROL-XL) 25 mg, oral, Daily, Do not crush or chew.    multivitamin tablet 1 tablet, oral, Daily    omega 3-dha-epa-fish oil (Fish OiL) 1,200 (144-216) mg capsule oral     omeprazole (PRILOSEC) 40 mg, oral, Daily before breakfast, Do not crush or chew.    SITagliptin phos-metformin (Janumet) 50-1,000 mg tablet 1 tablet, oral, 2 times daily (morning and late afternoon)    tamsulosin (FLOMAX) 0.4 mg, oral, Daily    testosterone cypionate (DEPO-TESTOSTERONE) 200 mg, intramuscular, Every 14 days    ticagrelor (BRILINTA) 90 mg, oral, 2 times daily    triamcinolone (Kenalog) 0.1 % cream Topical, Daily PRN       Physical Exam:  General: alert, oriented and in no acute distress  HEENT: NC/AT; EOMI; PERRLA, external ear is normal  Neck: supple; trachea midline; no masses; no JVD  Chest: clear breath sounds bilaterally; no wheezing  Cardio: regular rhythm, S1S2 normal, no murmurs  Abdomen: Soft, non-tender, non-distension, no organomegaly  Extremities: no clubbing/cyanosis/edema. Good healing R radial access site  Neuro: Grossly intact     Psychiatric: Normal mood and affect      Assessment/Plan     Mr. Gael Burnette is a 74 y.o. former smoker diabetic male being consulted by the Cardiology team for chest pain. Patient with prior medical history significant for CAD S/P recent PCI to LAD (and residual lesion to OM), hypertension, diabetes mellitus, hyperlipidemia, hypothyroidism. He presented to ED [facility] on 09/14/2024 complaining of chest pain. Patient states that he had left sided chest pain last week and underwent successful PCI to LAD, with remaining lesion to OM that was scheduled for treatment as outpatient. However, due to new onset chest pain, we have opted to admit the patient and to schedule the PCI to the remaining lesion as inpatient. Initial troponin was 60. Labwork otherwise unremarkable. EKG showed sinus rhythm with incomplete RBBB and non-specific ST-T changes. He was given nitroglycerin with improvement in his pain. Patient was admitted for clinical compensation.     Assessment    # Unstable angina  - Troponin 60.   - EKG showed  sinus rhythm with incomplete RBBB and  non-specific ST-T changes.  - We had a thorough conversation with the patient about the natural history of atherosclerosis and the importance for commitment with healthy lifestyle, including but not limited to healthy diet, regular exercises, avoid sedentary and compliance to medication.   - Left Heart Catheterization (09/10/2024):  Double vessel Coronary Artery Disease  Mid LAD 95% calcified lesion - bifurcation with 1st Dg (70% ostial lesion) and 2nd Dg (50% ostial lesion)   Mid 1st OM 90% calcified lesion  RCA with irregularities  Preserved LV systolic function  S/P Successful PCI to mid LAD (bifurcation with 1st and 2nd Dg) using one XOCHILT, guided by IVUS  - Therefore we will schedule PCI to remaining coronary lesions as inpatient.  - The natural history of atherosclerosis was discussed with the patient. The treatment options including GDMT, percutaneous intervention or surgical intervention were discussed with the patient. All the risks, benefits and alternative procedures were discussed. Complications of the procedure were also discussed, including but not limited to risk of bleeding, stroke, infarct, infection, urgent cardiac surgery or death. All questions were answered and the informed consent was obtained. After aforementioned testing and consultation, Left Heart Catheterization with potential Percutaneous Coronary Intervention would be a reasonable approach if the patient is candidate.  - Please keep NPO on Sunday after 11pm for PCI on Monday 09/16 by morning.  - Keep ASA; Ticagrelor; Atorvastatin 40mg daily; keep ACE-I.  - Keep Metoprolol succinate 25mg daily.  - Follow up with Cardiology clinic upon discharge.     # Hypertension  - Controlled blood pressure.  - Keep current medications including lisinopril 5mg daily.  - Patient counseled to keep a healthy lifestyle including regular exercise and low-sodium diet.  - Recommended home blood pressure monitoring.  - Goal of BP < 130/80mmHg.     # Diabetes  -  Counseled on healthy diet and regular exercises.  - Discussed need for weight loss and the benefits.   - Keep current medications including empaglifozin, glipizide.  - ISS.     # Hyperlipidemia  - Keep home medication with Atorvastatin 40mg daily.  - Counseled on healthy diet and regular exercise.      # Hypothyroidism  - Keep Levothyroxine 112mcg daily      Patient seen in the ER. This critically ill patient continues to be at-risk for clinically significant deterioration / failure due to the above mentioned dysfunctional, unstable organ systems.  I have personally identified and managed all complex critical care issues to prevent aforementioned clinical deterioration.  Critical care time is spent at bedside and/or the immediate area and has included, but is not limited to, the review of diagnostic tests, labs, radiographs, serial assessments of hemodynamics, respiratory status, ventilatory management, and family updates.  Time spent in procedures and teaching are reported separately.     Critical care time: 65 minutes    Peripheral IV 09/14/24 20 G Right Antecubital (Active)   Site Assessment Clean;Dry;Intact 09/14/24 1752   Dressing Type Transparent 09/14/24 1752   Line Status Blood return noted;Flushed;Saline locked 09/14/24 1752   Dressing Status Clean;Dry 09/14/24 1752   Number of days: 0       Code Status:  Full Code    Tyshawn Dixno MD  Cardiology

## 2024-09-14 NOTE — Clinical Note
Angioplasty of the marginal circumflex lesion. Inflation 1: Pressure = 12 susan; Duration = 15 sec. Inflation 2: Pressure = 12 susan; Duration = 15 sec.

## 2024-09-15 VITALS
TEMPERATURE: 96.6 F | RESPIRATION RATE: 20 BRPM | WEIGHT: 192 LBS | SYSTOLIC BLOOD PRESSURE: 120 MMHG | HEIGHT: 71 IN | OXYGEN SATURATION: 94 % | HEART RATE: 63 BPM | DIASTOLIC BLOOD PRESSURE: 75 MMHG | BODY MASS INDEX: 26.88 KG/M2

## 2024-09-15 LAB
ANION GAP SERPL CALC-SCNC: 9 MMOL/L (ref 10–20)
ATRIAL RATE: 104 BPM
BUN SERPL-MCNC: 22 MG/DL (ref 6–23)
CALCIUM SERPL-MCNC: 9.1 MG/DL (ref 8.6–10.3)
CARDIAC TROPONIN I PNL SERPL HS: 59 NG/L (ref 0–20)
CHLORIDE SERPL-SCNC: 107 MMOL/L (ref 98–107)
CO2 SERPL-SCNC: 28 MMOL/L (ref 21–32)
CREAT SERPL-MCNC: 1.29 MG/DL (ref 0.5–1.3)
EGFRCR SERPLBLD CKD-EPI 2021: 59 ML/MIN/1.73M*2
GLUCOSE BLD MANUAL STRIP-MCNC: 116 MG/DL (ref 74–99)
GLUCOSE BLD MANUAL STRIP-MCNC: 128 MG/DL (ref 74–99)
GLUCOSE BLD MANUAL STRIP-MCNC: 167 MG/DL (ref 74–99)
GLUCOSE BLD MANUAL STRIP-MCNC: 220 MG/DL (ref 74–99)
GLUCOSE SERPL-MCNC: 144 MG/DL (ref 74–99)
HOLD SPECIMEN: NORMAL
P AXIS: 59 DEGREES
P OFFSET: 197 MS
P ONSET: 145 MS
POTASSIUM SERPL-SCNC: 4 MMOL/L (ref 3.5–5.3)
PR INTERVAL: 162 MS
Q ONSET: 226 MS
QRS COUNT: 17 BEATS
QRS DURATION: 108 MS
QT INTERVAL: 346 MS
QTC CALCULATION(BAZETT): 454 MS
QTC FREDERICIA: 415 MS
R AXIS: -39 DEGREES
SODIUM SERPL-SCNC: 140 MMOL/L (ref 136–145)
T AXIS: 51 DEGREES
T OFFSET: 399 MS
VENTRICULAR RATE: 104 BPM

## 2024-09-15 PROCEDURE — 84484 ASSAY OF TROPONIN QUANT: CPT | Performed by: INTERNAL MEDICINE

## 2024-09-15 PROCEDURE — 2500000001 HC RX 250 WO HCPCS SELF ADMINISTERED DRUGS (ALT 637 FOR MEDICARE OP): Performed by: INTERNAL MEDICINE

## 2024-09-15 PROCEDURE — 99232 SBSQ HOSP IP/OBS MODERATE 35: CPT | Performed by: STUDENT IN AN ORGANIZED HEALTH CARE EDUCATION/TRAINING PROGRAM

## 2024-09-15 PROCEDURE — 80048 BASIC METABOLIC PNL TOTAL CA: CPT | Performed by: INTERNAL MEDICINE

## 2024-09-15 PROCEDURE — G0378 HOSPITAL OBSERVATION PER HR: HCPCS

## 2024-09-15 PROCEDURE — 99222 1ST HOSP IP/OBS MODERATE 55: CPT | Performed by: STUDENT IN AN ORGANIZED HEALTH CARE EDUCATION/TRAINING PROGRAM

## 2024-09-15 PROCEDURE — 82947 ASSAY GLUCOSE BLOOD QUANT: CPT

## 2024-09-15 PROCEDURE — 2500000002 HC RX 250 W HCPCS SELF ADMINISTERED DRUGS (ALT 637 FOR MEDICARE OP, ALT 636 FOR OP/ED): Performed by: INTERNAL MEDICINE

## 2024-09-15 PROCEDURE — 96372 THER/PROPH/DIAG INJ SC/IM: CPT | Performed by: STUDENT IN AN ORGANIZED HEALTH CARE EDUCATION/TRAINING PROGRAM

## 2024-09-15 PROCEDURE — 36415 COLL VENOUS BLD VENIPUNCTURE: CPT | Performed by: INTERNAL MEDICINE

## 2024-09-15 PROCEDURE — 2500000004 HC RX 250 GENERAL PHARMACY W/ HCPCS (ALT 636 FOR OP/ED): Performed by: STUDENT IN AN ORGANIZED HEALTH CARE EDUCATION/TRAINING PROGRAM

## 2024-09-15 RX ADMIN — EMPAGLIFLOZIN 25 MG: 25 TABLET, FILM COATED ORAL at 08:29

## 2024-09-15 RX ADMIN — TICAGRELOR 90 MG: 90 TABLET ORAL at 10:22

## 2024-09-15 RX ADMIN — ENOXAPARIN SODIUM 40 MG: 40 INJECTION SUBCUTANEOUS at 21:04

## 2024-09-15 RX ADMIN — TAMSULOSIN HYDROCHLORIDE 0.4 MG: 0.4 CAPSULE ORAL at 08:29

## 2024-09-15 RX ADMIN — PANTOPRAZOLE SODIUM 40 MG: 40 TABLET, DELAYED RELEASE ORAL at 05:59

## 2024-09-15 RX ADMIN — LEVOTHYROXINE SODIUM 112 MCG: 112 TABLET ORAL at 05:59

## 2024-09-15 RX ADMIN — INSULIN LISPRO 4 UNITS: 100 INJECTION, SOLUTION INTRAVENOUS; SUBCUTANEOUS at 12:18

## 2024-09-15 RX ADMIN — TICAGRELOR 90 MG: 90 TABLET ORAL at 21:04

## 2024-09-15 RX ADMIN — ASPIRIN 81 MG: 81 TABLET, COATED ORAL at 08:29

## 2024-09-15 RX ADMIN — METOPROLOL SUCCINATE 25 MG: 25 TABLET, EXTENDED RELEASE ORAL at 08:28

## 2024-09-15 RX ADMIN — ATORVASTATIN CALCIUM 40 MG: 40 TABLET, FILM COATED ORAL at 21:04

## 2024-09-15 ASSESSMENT — COGNITIVE AND FUNCTIONAL STATUS - GENERAL
DAILY ACTIVITIY SCORE: 24
MOBILITY SCORE: 24

## 2024-09-15 ASSESSMENT — PAIN SCALES - GENERAL
PAINLEVEL_OUTOF10: 0 - NO PAIN
PAINLEVEL_OUTOF10: 0 - NO PAIN

## 2024-09-15 ASSESSMENT — PAIN - FUNCTIONAL ASSESSMENT: PAIN_FUNCTIONAL_ASSESSMENT: 0-10

## 2024-09-15 NOTE — PROGRESS NOTES
Subjective Data:  Patient reports feeling well, no new adverse events overnight. Patient denies any chest pain, shortness of breath, palpitations, dizziness or syncope. Patient is hemodynamically stable.  No chest pain overnight.    Overnight Events:    No     Objective Data:  Last Recorded Vitals:  Vitals:    09/14/24 2145 09/15/24 0000 09/15/24 0400 09/15/24 0713   BP: 127/71 100/64 107/56 116/72   BP Location: Left arm Left arm Left arm Left arm   Patient Position: Lying Lying Lying Lying   Pulse: 63 66 63 64   Resp: 18 18 18 20   Temp: 36.5 °C (97.7 °F) 36.2 °C (97.2 °F) 36.3 °C (97.3 °F) 35.7 °C (96.3 °F)   TempSrc: Temporal Tympanic Tympanic Temporal   SpO2: 96% 96% 96% 95%   Weight:       Height:           Last Labs:  CBC - 9/14/2024:  5:27 PM  7.5 16.0 203    46.6      CMP - 9/15/2024:  4:44 AM  9.1 5.8 19 --- 0.6   _ 4.0 19 57      PTT - 9/9/2024: 11:06 AM  0.9   10.7 31     TROPHS   Date/Time Value Ref Range Status   09/15/2024 04:44 AM 59 0 - 20 ng/L Final     Comment:     Previous result verified on 9/14/2024 1806 on specimen/case [id number] called with component TRPHS for procedure Troponin I, High Sensitivity, Initial with value 60 ng/L.   09/14/2024 06:43 PM 63 0 - 20 ng/L Final     Comment:     Previous result verified on 9/14/2024 1806 on specimen/case [id number] called with component TRPHS for procedure Troponin I, High Sensitivity, Initial with value 60 ng/L.   09/14/2024 05:27 PM 60 0 - 20 ng/L Final     BNP   Date/Time Value Ref Range Status   09/09/2024 11:07 AM 54 0 - 99 pg/mL Final     HGBA1C   Date/Time Value Ref Range Status   06/18/2024 10:56 AM 7.4 4.3 - 5.6 % Final     Comment:     American Diabetes Association guidelines indicate that patients with HgbA1c in the range 5.7-6.4% are at increased risk for development of diabetes, and intervention by lifestyle modification may be beneficial. HgbA1c greater or equal to 6.5% is considered diagnostic of diabetes.   12/11/2023 09:48  AM 6.8 4.3 - 5.6 % Final     Comment:     American Diabetes Association guidelines indicate that patients with HgbA1c in the range 5.7-6.4% are at increased risk for development of diabetes, and intervention by lifestyle modification may be beneficial. HgbA1c greater or equal to 6.5% is considered diagnostic of diabetes.     LDLCALC   Date/Time Value Ref Range Status   09/09/2024 02:50 PM 59 <=99 mg/dL Final     Comment:                                 Near   Borderline      AGE      Desirable  Optimal    High     High     Very High     0-19 Y     0 - 109     ---    110-129   >/= 130     ----    20-24 Y     0 - 119     ---    120-159   >/= 160     ----      >24 Y     0 -  99   100-129  130-159   160-189     >/=190       VLDL   Date/Time Value Ref Range Status   09/09/2024 02:50 PM 60 0 - 40 mg/dL Final      Last I/O:  No intake/output data recorded.    Past Cardiology Tests (Last 3 Years):  EKG:  ECG 12 lead 09/14/2024 (Wet Read)      ECG 12 Lead 09/10/2024      ECG 12 lead 09/09/2024 (Wet Read)      ECG 12 lead 09/09/2024 (Preliminary)    Echo:  Transthoracic Echo (TTE) Complete 09/10/2024    Ejection Fractions:  EF   Date/Time Value Ref Range Status   09/10/2024 07:37 AM 60 %      Cath:  Cardiac Catheterization Procedure 09/10/2024    Stress Test:  No results found for this or any previous visit from the past 1095 days.    Cardiac Imaging:  No results found for this or any previous visit from the past 1095 days.      Inpatient Medications:  Scheduled medications   Medication Dose Route Frequency    aspirin  81 mg oral Daily    atorvastatin  40 mg oral Nightly    empagliflozin  25 mg oral Daily    enoxaparin  40 mg subcutaneous q24h    insulin lispro  0-20 Units subcutaneous TID    levothyroxine  112 mcg oral Daily    metoprolol succinate XL  25 mg oral Daily    pantoprazole  40 mg oral Daily before breakfast    tamsulosin  0.4 mg oral Daily    ticagrelor  90 mg oral BID     PRN medications   Medication     acetaminophen    Or    acetaminophen    Or    acetaminophen    dextrose    dextrose    glucagon    glucagon    nitroglycerin     Continuous Medications   Medication Dose Last Rate    lactated Ringer's  75 mL/hr 75 mL/hr (09/15/24 0600)       Physical Exam:  General: alert, oriented and in no acute distress  HEENT: NC/AT; EOMI; PERRLA, external ear is normal  Neck: supple; trachea midline; no masses; no JVD  Chest: clear breath sounds bilaterally; no wheezing  Cardio: regular rhythm, S1S2 normal, no murmurs  Abdomen: Soft, non-tender, non-distension, no organomegaly  Extremities: no clubbing/cyanosis/edema. Good healing R radial access site  Neuro: Grossly intact     Psychiatric: Normal mood and affect      Assessment/Plan     Mr. Gael Burnette is a 74 y.o. former smoker diabetic male being consulted by the Cardiology team for chest pain. Patient with prior medical history significant for CAD S/P recent PCI to LAD (and residual lesion to OM), hypertension, diabetes mellitus, hyperlipidemia, hypothyroidism. He presented to ED [facility] on 09/14/2024 complaining of chest pain. Patient states that he had left sided chest pain last week and underwent successful PCI to LAD, with remaining lesion to OM that was scheduled for treatment as outpatient. However, due to new onset chest pain, we have opted to admit the patient and to schedule the PCI to the remaining lesion as inpatient. Initial troponin was 60. Labwork otherwise unremarkable. EKG showed sinus rhythm with incomplete RBBB and non-specific ST-T changes. He was given nitroglycerin with improvement in his pain. Patient was admitted for clinical compensation.      Assessment     # Unstable angina  - Troponin 60.   - EKG showed  sinus rhythm with incomplete RBBB and non-specific ST-T changes.  - We had a thorough conversation with the patient about the natural history of atherosclerosis and the importance for commitment with healthy lifestyle, including but not  limited to healthy diet, regular exercises, avoid sedentary and compliance to medication.   - Left Heart Catheterization (09/10/2024):  Double vessel Coronary Artery Disease  Mid LAD 95% calcified lesion - bifurcation with 1st Dg (70% ostial lesion) and 2nd Dg (50% ostial lesion)   Mid 1st OM 90% calcified lesion  RCA with irregularities  Preserved LV systolic function  S/P Successful PCI to mid LAD (bifurcation with 1st and 2nd Dg) using one XOCHILT, guided by IVUS  - Therefore we will schedule PCI to remaining coronary lesions as inpatient.  - The natural history of atherosclerosis was discussed with the patient. The treatment options including GDMT, percutaneous intervention or surgical intervention were discussed with the patient. All the risks, benefits and alternative procedures were discussed. Complications of the procedure were also discussed, including but not limited to risk of bleeding, stroke, infarct, infection, urgent cardiac surgery or death. All questions were answered and the informed consent was obtained. After aforementioned testing and consultation, Left Heart Catheterization with potential Percutaneous Coronary Intervention would be a reasonable approach if the patient is candidate.  - Please keep NPO on Sunday after 11pm for PCI on Monday 09/16 by morning.  - Keep ASA; Ticagrelor; Atorvastatin 40mg daily; keep ACE-I.  - Keep Metoprolol succinate 25mg daily.  - Follow up with Cardiology clinic upon discharge.     # Hypertension  - Controlled blood pressure.  - Keep current medications including lisinopril 5mg daily.  - Patient counseled to keep a healthy lifestyle including regular exercise and low-sodium diet.  - Recommended home blood pressure monitoring.  - Goal of BP < 130/80mmHg.     # Diabetes  - Counseled on healthy diet and regular exercises.  - Discussed need for weight loss and the benefits.   - Keep current medications including empaglifozin, glipizide.  - ISS.     # Hyperlipidemia  -  Keep home medication with Atorvastatin 40mg daily.  - Counseled on healthy diet and regular exercise.      # Hypothyroidism  - Keep Levothyroxine 112mcg daily      Thank you for allowing me to participate in the care of this patient. Please reach me out if you have any questions or if you need any clarifications regarding the patient's care.     Peripheral IV 09/14/24 20 G Right Antecubital (Active)   Site Assessment Clean;Dry;Intact 09/15/24 0600   Line Status Infusing 09/15/24 0600   Dressing Status Clean;Dry 09/15/24 0600   Number of days: 1       Code Status:  Full Code    Tyshawn Dixon MD  Cardiology

## 2024-09-15 NOTE — H&P
History Of Present Illness  Gael Burnette is a 74 y.o. male presenting with chest pain.  He presented to the emergency room secondary to chest pain.  He states that he was upstairs and he went downstairs and was helping his grandchildren with their bikes and with some electronic devices and started having left-sided chest pressure.  It was on the left side of his chest he sat down and after he sat down it started to cyndie but it did not fully cyndie and so he came to the emergency room  Now sitting here comfortably in bed the chest pain has pretty much totally resolved  He states that currently he is feeling well with no major complaints he does have peripheral edema of his lower extremities that has been present for quite some time  He recently was here in the hospital and received a stent to the LAD.  He was scheduled to have his OM also intervened on due to a lesion present there as well but this was in the future and now with his returning chest pain he is going to have this done on Monday by cardiology  Past Medical History  He has a past medical history of Coronary artery disease, Diabetes mellitus (Multi), Disease of thyroid gland, Hyperlipidemia, and Hypertension.    Surgical History  He has a past surgical history that includes Cardiac catheterization (N/A, 9/10/2024); Cardiac catheterization (N/A, 9/10/2024); and Cardiac catheterization (N/A, 9/10/2024).     Social History  He reports that he quit smoking about 43 years ago. His smoking use included cigarettes. He has never used smokeless tobacco. He reports that he does not drink alcohol and does not use drugs.    Family History  No family history on file.     Allergies  Patient has no known allergies.    Review of Systems  A full 10 point review of systems was obtained is negative except HPI as above  Physical Exam  Constitutional:       Appearance: Normal appearance.   HENT:      Head: Normocephalic and atraumatic.      Right Ear: External ear normal.       Left Ear: External ear normal.      Nose: Nose normal.      Mouth/Throat:      Mouth: Mucous membranes are moist.      Pharynx: Oropharynx is clear.   Eyes:      Extraocular Movements: Extraocular movements intact.      Conjunctiva/sclera: Conjunctivae normal.      Pupils: Pupils are equal, round, and reactive to light.   Cardiovascular:      Rate and Rhythm: Normal rate and regular rhythm.   Pulmonary:      Effort: Pulmonary effort is normal.      Breath sounds: Normal breath sounds.   Abdominal:      General: Abdomen is flat.      Palpations: Abdomen is soft.   Musculoskeletal:         General: Swelling present.      Right lower leg: Edema present.      Left lower leg: Edema present.   Skin:     General: Skin is warm and dry.   Neurological:      General: No focal deficit present.      Mental Status: He is alert and oriented to person, place, and time.   Psychiatric:         Mood and Affect: Mood normal.         Behavior: Behavior normal.          Last Recorded Vitals  /72 (Patient Position: Sitting)   Pulse 67   Temp 36.2 °C (97.1 °F) (Oral)   Resp 18   Wt 87.1 kg (192 lb)   SpO2 96%     Relevant Results        Results reviewed     Assessment/Plan   Assessment & Plan  Chest pain    Coronary atherosclerosis    Unstable angina (Multi)      Chest pain  Unstable angina  Coronary artery disease with recent stent and known lesion of the OM  Diabetes mellitus type 2  Acute kidney injury on presentation  Hypothyroidism  History of nicotine abuse    Plan:  At this time he will be admitted to the hospital and the plan is for him to undergo a cardiac catheterization on Monday.  We will continue his aspirin and Brilinta as he has a recent stent in place  We will continue his SGLT2 inhibitor but then will use insulin otherwise to control blood sugars  I will hold his lisinopril for now due to his acute kidney injury  Gentle IV fluids through the night  Cardiology has been consulted and saw him from the  emergency room  [facility] for DVT prophylaxis         Stan Dick, DO

## 2024-09-15 NOTE — PROGRESS NOTES
Gael Burnette is a 74 y.o. male on day 0 of admission presenting with Chest pain.      Subjective   Patient is seen and examined at bedside.  No chest pain.  He is feeling comfortable       Objective     Last Recorded Vitals  /72 (BP Location: Left arm, Patient Position: Lying)   Pulse 64   Temp 35.7 °C (96.3 °F) (Temporal)   Resp 20   Wt 87.1 kg (192 lb)   SpO2 95%   Intake/Output last 3 Shifts:    Intake/Output Summary (Last 24 hours) at 9/15/2024 1011  Last data filed at 9/15/2024 0955  Gross per 24 hour   Intake 360 ml   Output --   Net 360 ml       Admission Weight  Weight: 87.1 kg (192 lb) (09/14/24 1722)    Daily Weight  09/14/24 : 87.1 kg (192 lb)    Image Results  XR chest 1 view  Narrative: Interpreted By:  Venancio Montiel,   STUDY:  XR CHEST 1 VIEW;  9/14/2024 5:34 pm      INDICATION:  Signs/Symptoms:Chest Pain.      COMPARISON:  09/09/2024      ACCESSION NUMBER(S):  [id number]      ORDERING CLINICIAN:  UMER SCHMITZ      FINDINGS:  The lungs are clear without apparent pleural effusion. Normal heart  size, mediastinum, dominique, and pulmonary vasculature.      Impression: No active disease in the chest.      MACRO:  None      Signed by: Venancio Montiel 9/14/2024 5:40 PM  Dictation workstation:   [id number]      Physical Exam  Constitutional:       Appearance: Normal appearance.   HENT:      Head: Normocephalic.      Nose: Nose normal.      Mouth/Throat:      Mouth: Mucous membranes are moist.   Eyes:      Pupils: Pupils are equal, round, and reactive to light.   Cardiovascular:      Rate and Rhythm: Normal rate.      Pulses: Normal pulses.   Pulmonary:      Effort: Pulmonary effort is normal.   Abdominal:      Palpations: Abdomen is soft.   Musculoskeletal:         General: Normal range of motion.      Cervical back: Normal range of motion.   Skin:     General: Skin is dry.      Capillary Refill: Capillary refill takes less than 2 seconds.   Neurological:      General: No focal deficit present.       Mental Status: He is alert.         Relevant Results             Scheduled medications  aspirin, 81 mg, oral, Daily  atorvastatin, 40 mg, oral, Nightly  empagliflozin, 25 mg, oral, Daily  enoxaparin, 40 mg, subcutaneous, q24h  insulin lispro, 0-20 Units, subcutaneous, TID  levothyroxine, 112 mcg, oral, Daily  metoprolol succinate XL, 25 mg, oral, Daily  pantoprazole, 40 mg, oral, Daily before breakfast  tamsulosin, 0.4 mg, oral, Daily  ticagrelor, 90 mg, oral, BID      Continuous medications  lactated Ringer's, 75 mL/hr, Last Rate: 75 mL/hr (09/15/24 0600)      PRN medications  PRN medications: acetaminophen **OR** acetaminophen **OR** acetaminophen, dextrose, dextrose, glucagon, glucagon, nitroglycerin    XR chest 1 view    Result Date: 9/14/2024  Interpreted By:  Venancio Montiel, STUDY: XR CHEST 1 VIEW;  9/14/2024 5:34 pm   INDICATION: Signs/Symptoms:Chest Pain.   COMPARISON: 09/09/2024   ACCESSION NUMBER(S): [id number]   ORDERING CLINICIAN: UMER SCHMITZ   FINDINGS: The lungs are clear without apparent pleural effusion. Normal heart size, mediastinum, dominique, and pulmonary vasculature.       No active disease in the chest.   MACRO: None   Signed by: Venancio Montiel 9/14/2024 5:40 PM Dictation workstation:   [id number]    ECG 12 Lead    Result Date: 9/10/2024  Normal sinus rhythm Nonspecific intraventricular conduction delay Borderline ECG When compared with ECG of 09-SEP-2024 10:52, (unconfirmed) Nonspecific intraventricular conduction delay has replaced Incomplete right bundle branch block Confirmed by Tyshawn Tatum ([id number]) on 9/10/2024 4:39:58 PM    5     Assessment/Plan      74-year-old male with a past medical history of coronary artery disease status post stent, T2 diabetes mellitus, hypothyroidism, hyperlipidemia, hypertension, peripheral neuropathy presents with complaints of unstable angina    Assessment  Unstable angina  Troponin elevation  Type 2 diabetes  mellitus  Hypothyroidism  Ex-smoker  Hypertension    Plan  Continue current ongoing care  Patient will be having a second cardiac cath with a staged procedure tomorrow  N.p.o. tonight  Continue aspirin and Brilinta  Continue other home medications, continue to hold lisinopril  Fingerstick glucose ACHS  Cardiology consulted  Continue to monitor troponins  CBC, BMP tomorrow    DVT prophylaxis: Lovenox  GI prophylaxis Protonix  Full code          Rina Bolden MD

## 2024-09-16 ENCOUNTER — APPOINTMENT (OUTPATIENT)
Dept: CARDIOLOGY | Facility: HOSPITAL | Age: 74
DRG: 322 | End: 2024-09-16
Payer: MEDICARE

## 2024-09-16 LAB
ANION GAP SERPL CALC-SCNC: 11 MMOL/L (ref 10–20)
BUN SERPL-MCNC: 21 MG/DL (ref 6–23)
CALCIUM SERPL-MCNC: 9.3 MG/DL (ref 8.6–10.3)
CHLORIDE SERPL-SCNC: 107 MMOL/L (ref 98–107)
CO2 SERPL-SCNC: 29 MMOL/L (ref 21–32)
CREAT SERPL-MCNC: 1.36 MG/DL (ref 0.5–1.3)
EGFRCR SERPLBLD CKD-EPI 2021: 55 ML/MIN/1.73M*2
ERYTHROCYTE [DISTWIDTH] IN BLOOD BY AUTOMATED COUNT: 12.2 % (ref 11.5–14.5)
GLUCOSE BLD MANUAL STRIP-MCNC: 150 MG/DL (ref 74–99)
GLUCOSE BLD MANUAL STRIP-MCNC: 159 MG/DL (ref 74–99)
GLUCOSE BLD MANUAL STRIP-MCNC: 162 MG/DL (ref 74–99)
GLUCOSE SERPL-MCNC: 124 MG/DL (ref 74–99)
HCT VFR BLD AUTO: 48.3 % (ref 41–52)
HGB BLD-MCNC: 16.6 G/DL (ref 13.5–17.5)
MCH RBC QN AUTO: 31.4 PG (ref 26–34)
MCHC RBC AUTO-ENTMCNC: 34.4 G/DL (ref 32–36)
MCV RBC AUTO: 91 FL (ref 80–100)
NRBC BLD-RTO: 0 /100 WBCS (ref 0–0)
PLATELET # BLD AUTO: 202 X10*3/UL (ref 150–450)
POTASSIUM SERPL-SCNC: 4.7 MMOL/L (ref 3.5–5.3)
RBC # BLD AUTO: 5.29 X10*6/UL (ref 4.5–5.9)
SODIUM SERPL-SCNC: 142 MMOL/L (ref 136–145)
WBC # BLD AUTO: 6.7 X10*3/UL (ref 4.4–11.3)

## 2024-09-16 PROCEDURE — 2500000001 HC RX 250 WO HCPCS SELF ADMINISTERED DRUGS (ALT 637 FOR MEDICARE OP): Performed by: STUDENT IN AN ORGANIZED HEALTH CARE EDUCATION/TRAINING PROGRAM

## 2024-09-16 PROCEDURE — 027135Z DILATION OF CORONARY ARTERY, TWO ARTERIES WITH TWO DRUG-ELUTING INTRALUMINAL DEVICES, PERCUTANEOUS APPROACH: ICD-10-PCS | Performed by: STUDENT IN AN ORGANIZED HEALTH CARE EDUCATION/TRAINING PROGRAM

## 2024-09-16 PROCEDURE — 96373 THER/PROPH/DIAG INJ IA: CPT | Performed by: STUDENT IN AN ORGANIZED HEALTH CARE EDUCATION/TRAINING PROGRAM

## 2024-09-16 PROCEDURE — C1725 CATH, TRANSLUMIN NON-LASER: HCPCS | Performed by: STUDENT IN AN ORGANIZED HEALTH CARE EDUCATION/TRAINING PROGRAM

## 2024-09-16 PROCEDURE — C1874 STENT, COATED/COV W/DEL SYS: HCPCS | Performed by: STUDENT IN AN ORGANIZED HEALTH CARE EDUCATION/TRAINING PROGRAM

## 2024-09-16 PROCEDURE — 93010 ELECTROCARDIOGRAM REPORT: CPT | Performed by: STUDENT IN AN ORGANIZED HEALTH CARE EDUCATION/TRAINING PROGRAM

## 2024-09-16 PROCEDURE — 2550000001 HC RX 255 CONTRASTS: Performed by: STUDENT IN AN ORGANIZED HEALTH CARE EDUCATION/TRAINING PROGRAM

## 2024-09-16 PROCEDURE — 93458 L HRT ARTERY/VENTRICLE ANGIO: CPT | Performed by: STUDENT IN AN ORGANIZED HEALTH CARE EDUCATION/TRAINING PROGRAM

## 2024-09-16 PROCEDURE — 2500000001 HC RX 250 WO HCPCS SELF ADMINISTERED DRUGS (ALT 637 FOR MEDICARE OP): Performed by: INTERNAL MEDICINE

## 2024-09-16 PROCEDURE — 99152 MOD SED SAME PHYS/QHP 5/>YRS: CPT | Performed by: STUDENT IN AN ORGANIZED HEALTH CARE EDUCATION/TRAINING PROGRAM

## 2024-09-16 PROCEDURE — 93005 ELECTROCARDIOGRAM TRACING: CPT

## 2024-09-16 PROCEDURE — C9601 PERC DRUG-EL COR STENT BRAN: HCPCS | Mod: RI | Performed by: STUDENT IN AN ORGANIZED HEALTH CARE EDUCATION/TRAINING PROGRAM

## 2024-09-16 PROCEDURE — 99153 MOD SED SAME PHYS/QHP EA: CPT | Performed by: STUDENT IN AN ORGANIZED HEALTH CARE EDUCATION/TRAINING PROGRAM

## 2024-09-16 PROCEDURE — 2500000004 HC RX 250 GENERAL PHARMACY W/ HCPCS (ALT 636 FOR OP/ED)

## 2024-09-16 PROCEDURE — 2780000003 HC OR 278 NO HCPCS: Performed by: STUDENT IN AN ORGANIZED HEALTH CARE EDUCATION/TRAINING PROGRAM

## 2024-09-16 PROCEDURE — 2500000004 HC RX 250 GENERAL PHARMACY W/ HCPCS (ALT 636 FOR OP/ED): Performed by: INTERNAL MEDICINE

## 2024-09-16 PROCEDURE — 85027 COMPLETE CBC AUTOMATED: CPT | Performed by: STUDENT IN AN ORGANIZED HEALTH CARE EDUCATION/TRAINING PROGRAM

## 2024-09-16 PROCEDURE — C1769 GUIDE WIRE: HCPCS | Performed by: STUDENT IN AN ORGANIZED HEALTH CARE EDUCATION/TRAINING PROGRAM

## 2024-09-16 PROCEDURE — 4A023N7 MEASUREMENT OF CARDIAC SAMPLING AND PRESSURE, LEFT HEART, PERCUTANEOUS APPROACH: ICD-10-PCS | Performed by: STUDENT IN AN ORGANIZED HEALTH CARE EDUCATION/TRAINING PROGRAM

## 2024-09-16 PROCEDURE — 92978 ENDOLUMINL IVUS OCT C 1ST: CPT | Performed by: STUDENT IN AN ORGANIZED HEALTH CARE EDUCATION/TRAINING PROGRAM

## 2024-09-16 PROCEDURE — B2111ZZ FLUOROSCOPY OF MULTIPLE CORONARY ARTERIES USING LOW OSMOLAR CONTRAST: ICD-10-PCS | Performed by: STUDENT IN AN ORGANIZED HEALTH CARE EDUCATION/TRAINING PROGRAM

## 2024-09-16 PROCEDURE — 2500000005 HC RX 250 GENERAL PHARMACY W/O HCPCS: Performed by: STUDENT IN AN ORGANIZED HEALTH CARE EDUCATION/TRAINING PROGRAM

## 2024-09-16 PROCEDURE — 7100000009 HC PHASE TWO TIME - INITIAL BASE CHARGE: Performed by: STUDENT IN AN ORGANIZED HEALTH CARE EDUCATION/TRAINING PROGRAM

## 2024-09-16 PROCEDURE — C1760 CLOSURE DEV, VASC: HCPCS | Performed by: STUDENT IN AN ORGANIZED HEALTH CARE EDUCATION/TRAINING PROGRAM

## 2024-09-16 PROCEDURE — 36415 COLL VENOUS BLD VENIPUNCTURE: CPT | Performed by: STUDENT IN AN ORGANIZED HEALTH CARE EDUCATION/TRAINING PROGRAM

## 2024-09-16 PROCEDURE — 99232 SBSQ HOSP IP/OBS MODERATE 35: CPT | Performed by: STUDENT IN AN ORGANIZED HEALTH CARE EDUCATION/TRAINING PROGRAM

## 2024-09-16 PROCEDURE — 82947 ASSAY GLUCOSE BLOOD QUANT: CPT

## 2024-09-16 PROCEDURE — 80048 BASIC METABOLIC PNL TOTAL CA: CPT | Performed by: STUDENT IN AN ORGANIZED HEALTH CARE EDUCATION/TRAINING PROGRAM

## 2024-09-16 PROCEDURE — 2500000004 HC RX 250 GENERAL PHARMACY W/ HCPCS (ALT 636 FOR OP/ED): Performed by: STUDENT IN AN ORGANIZED HEALTH CARE EDUCATION/TRAINING PROGRAM

## 2024-09-16 PROCEDURE — C9600 PERC DRUG-EL COR STENT SING: HCPCS | Mod: LC | Performed by: STUDENT IN AN ORGANIZED HEALTH CARE EDUCATION/TRAINING PROGRAM

## 2024-09-16 PROCEDURE — C1753 CATH, INTRAVAS ULTRASOUND: HCPCS | Performed by: STUDENT IN AN ORGANIZED HEALTH CARE EDUCATION/TRAINING PROGRAM

## 2024-09-16 PROCEDURE — C1894 INTRO/SHEATH, NON-LASER: HCPCS | Performed by: STUDENT IN AN ORGANIZED HEALTH CARE EDUCATION/TRAINING PROGRAM

## 2024-09-16 PROCEDURE — C1887 CATHETER, GUIDING: HCPCS | Performed by: STUDENT IN AN ORGANIZED HEALTH CARE EDUCATION/TRAINING PROGRAM

## 2024-09-16 PROCEDURE — 2500000002 HC RX 250 W HCPCS SELF ADMINISTERED DRUGS (ALT 637 FOR MEDICARE OP, ALT 636 FOR OP/ED): Performed by: INTERNAL MEDICINE

## 2024-09-16 PROCEDURE — 92979 ENDOLUMINL IVUS OCT C EA: CPT | Performed by: STUDENT IN AN ORGANIZED HEALTH CARE EDUCATION/TRAINING PROGRAM

## 2024-09-16 PROCEDURE — 92928 PRQ TCAT PLMT NTRAC ST 1 LES: CPT | Performed by: STUDENT IN AN ORGANIZED HEALTH CARE EDUCATION/TRAINING PROGRAM

## 2024-09-16 PROCEDURE — 7100000010 HC PHASE TWO TIME - EACH INCREMENTAL 1 MINUTE: Performed by: STUDENT IN AN ORGANIZED HEALTH CARE EDUCATION/TRAINING PROGRAM

## 2024-09-16 PROCEDURE — 2720000007 HC OR 272 NO HCPCS: Performed by: STUDENT IN AN ORGANIZED HEALTH CARE EDUCATION/TRAINING PROGRAM

## 2024-09-16 PROCEDURE — 2020000001 HC ICU ROOM DAILY

## 2024-09-16 DEVICE — STENT ONYXNG20015UX ONYX 2.00X15RX
Type: IMPLANTABLE DEVICE | Site: HEART | Status: FUNCTIONAL
Brand: ONYX FRONTIER™

## 2024-09-16 DEVICE — STENT ONYXNG20026UX ONYX 2.00X26RX
Type: IMPLANTABLE DEVICE | Site: HEART | Status: FUNCTIONAL
Brand: ONYX FRONTIER™

## 2024-09-16 RX ORDER — LIDOCAINE HYDROCHLORIDE 20 MG/ML
INJECTION, SOLUTION INFILTRATION; PERINEURAL AS NEEDED
Status: DISCONTINUED | OUTPATIENT
Start: 2024-09-16 | End: 2024-09-16 | Stop reason: HOSPADM

## 2024-09-16 RX ORDER — NAPROXEN SODIUM 220 MG/1
81 TABLET, FILM COATED ORAL DAILY
Status: DISCONTINUED | OUTPATIENT
Start: 2024-09-16 | End: 2024-09-17 | Stop reason: HOSPADM

## 2024-09-16 RX ORDER — MIDAZOLAM HYDROCHLORIDE 1 MG/ML
INJECTION, SOLUTION INTRAMUSCULAR; INTRAVENOUS AS NEEDED
Status: DISCONTINUED | OUTPATIENT
Start: 2024-09-16 | End: 2024-09-16 | Stop reason: HOSPADM

## 2024-09-16 RX ORDER — IODIXANOL 320 MG/ML
INJECTION, SOLUTION INTRAVASCULAR AS NEEDED
Status: DISCONTINUED | OUTPATIENT
Start: 2024-09-16 | End: 2024-09-16 | Stop reason: HOSPADM

## 2024-09-16 RX ORDER — FENTANYL CITRATE 50 UG/ML
INJECTION, SOLUTION INTRAMUSCULAR; INTRAVENOUS AS NEEDED
Status: DISCONTINUED | OUTPATIENT
Start: 2024-09-16 | End: 2024-09-16 | Stop reason: HOSPADM

## 2024-09-16 RX ORDER — SODIUM CHLORIDE 9 MG/ML
1 INJECTION, SOLUTION INTRAVENOUS CONTINUOUS
Status: ACTIVE | OUTPATIENT
Start: 2024-09-16 | End: 2024-09-16

## 2024-09-16 RX ORDER — HEPARIN SODIUM 1000 [USP'U]/ML
INJECTION, SOLUTION INTRAVENOUS; SUBCUTANEOUS AS NEEDED
Status: DISCONTINUED | OUTPATIENT
Start: 2024-09-16 | End: 2024-09-16 | Stop reason: HOSPADM

## 2024-09-16 RX ORDER — NITROGLYCERIN 40 MG/100ML
INJECTION INTRAVENOUS AS NEEDED
Status: DISCONTINUED | OUTPATIENT
Start: 2024-09-16 | End: 2024-09-16 | Stop reason: HOSPADM

## 2024-09-16 RX ADMIN — SODIUM CHLORIDE, POTASSIUM CHLORIDE, SODIUM LACTATE AND CALCIUM CHLORIDE 75 ML/HR: 600; 310; 30; 20 INJECTION, SOLUTION INTRAVENOUS at 12:10

## 2024-09-16 RX ADMIN — TAMSULOSIN HYDROCHLORIDE 0.4 MG: 0.4 CAPSULE ORAL at 12:07

## 2024-09-16 RX ADMIN — SODIUM CHLORIDE 1 ML/KG/HR: 9 INJECTION, SOLUTION INTRAVENOUS at 09:20

## 2024-09-16 RX ADMIN — INSULIN LISPRO 4 UNITS: 100 INJECTION, SOLUTION INTRAVENOUS; SUBCUTANEOUS at 17:49

## 2024-09-16 RX ADMIN — PANTOPRAZOLE SODIUM 40 MG: 40 TABLET, DELAYED RELEASE ORAL at 05:23

## 2024-09-16 RX ADMIN — ASPIRIN 81 MG CHEWABLE TABLET 81 MG: 81 TABLET CHEWABLE at 07:12

## 2024-09-16 RX ADMIN — TICAGRELOR 90 MG: 90 TABLET ORAL at 20:15

## 2024-09-16 RX ADMIN — ATORVASTATIN CALCIUM 40 MG: 40 TABLET, FILM COATED ORAL at 20:15

## 2024-09-16 RX ADMIN — LEVOTHYROXINE SODIUM 112 MCG: 112 TABLET ORAL at 05:23

## 2024-09-16 RX ADMIN — EMPAGLIFLOZIN 25 MG: 25 TABLET, FILM COATED ORAL at 12:07

## 2024-09-16 RX ADMIN — METOPROLOL SUCCINATE 25 MG: 25 TABLET, EXTENDED RELEASE ORAL at 12:07

## 2024-09-16 RX ADMIN — ACETAMINOPHEN 650 MG: 325 TABLET ORAL at 12:30

## 2024-09-16 RX ADMIN — TICAGRELOR 90 MG: 90 TABLET ORAL at 07:12

## 2024-09-16 ASSESSMENT — PAIN - FUNCTIONAL ASSESSMENT

## 2024-09-16 ASSESSMENT — PAIN SCALES - GENERAL
PAINLEVEL_OUTOF10: 2
PAINLEVEL_OUTOF10: 0 - NO PAIN
PAINLEVEL_OUTOF10: 0 - NO PAIN
PAINLEVEL_OUTOF10: 4
PAINLEVEL_OUTOF10: 1
PAINLEVEL_OUTOF10: 3
PAINLEVEL_OUTOF10: 0 - NO PAIN
PAINLEVEL_OUTOF10: 4
PAINLEVEL_OUTOF10: 3
PAINLEVEL_OUTOF10: 0 - NO PAIN
PAINLEVEL_OUTOF10: 2
PAINLEVEL_OUTOF10: 0 - NO PAIN
PAINLEVEL_OUTOF10: 3
PAINLEVEL_OUTOF10: 2
PAINLEVEL_OUTOF10: 0 - NO PAIN
PAINLEVEL_OUTOF10: 4
PAINLEVEL_OUTOF10: 0 - NO PAIN
PAINLEVEL_OUTOF10: 0 - NO PAIN
PAINLEVEL_OUTOF10: 2
PAINLEVEL_OUTOF10: 0 - NO PAIN
PAINLEVEL_OUTOF10: 2
PAINLEVEL_OUTOF10: 0 - NO PAIN
PAINLEVEL_OUTOF10: 4
PAINLEVEL_OUTOF10: 0 - NO PAIN
PAINLEVEL_OUTOF10: 1
PAINLEVEL_OUTOF10: 2

## 2024-09-16 ASSESSMENT — COGNITIVE AND FUNCTIONAL STATUS - GENERAL
MOBILITY SCORE: 24
PERSONAL GROOMING: A LITTLE
EATING MEALS: A LITTLE
DAILY ACTIVITIY SCORE: 22

## 2024-09-16 ASSESSMENT — PAIN DESCRIPTION - DESCRIPTORS
DESCRIPTORS: THROBBING

## 2024-09-16 NOTE — POST-PROCEDURE NOTE
Physician Transition of Care Summary  Invasive Cardiovascular Lab    Procedure Date: 9/16/2024  Attending:    * Tyshawn Dixon - Primary  Resident/Fellow/Other Assistant: Surgeons and Role:  * No surgeons found with a matching role *    Indications:   Pre-op Diagnosis      * Unstable angina (Multi) [I20.0]    Post-procedure diagnosis:   Post-op Diagnosis     * Unstable angina (Multi) [I20.0]    Procedure(s):     * PCI XOCHILT Stent- Coronary      Procedure Findings:   Double vessel Coronary Artery Disease  Patent stent to mid LAD  Mid 1st OM 90% calcified lesion  Prox-mid Ramus 95% diffuse lesion  S/P Successful PCI to prox-mid ramus using one XOCHILT, guided by IVUS  S/P Successful PCI to mid 1st OM (bifurcation lesion) using one XOCHILT, guided by IVUS    Description of the Procedure:   R radial artery access with 6F sheath. LV and Ao hemodynamic measurements. S/P Left Heart Catheterization with 6F XB 3.5 guide catheter that showed double vessel obstructive coronary artery disease.   Patent stent to mid LAD  Mid 1st OM 90% calcified lesion  Prox-mid Ramus 95% diffuse lesion     Preserved LV systolic function. Patient remained stable during the entire procedure. No complications. Hemostasis with TR Band.     Heparinization 100ui/Kg. ACT > 250s.      S/P successful percutaneous coronary intervention to prox-mid Ramus using one drug-eluting stent (XOCHILT) 2.0/26mm (post-dil 2.5mm NC balloon) guided by intra-vascular ultrasound (IVUS).   S/P successful percutaneous coronary intervention to mid 1st OM (bifurcation lesion) using one drug-eluting stent (XOCHILT) 2.0/15mm (post-dil 2.5mm NC balloon) guided by intra-vascular ultrasound (IVUS).   Patient remained stable during the entire procedure. No complications. Hemostasis with TR Band.      Plan:    Should be discharged home keeping ASA 81mg daily and Ticagrelor 90mg BID.   Compared to the pre-procedural EKG, post-procedural EKG with no new abnormalities and no signs of acute  coronary syndrome.   Keep hydration 100mL/h for at least 3 hours, ideally 6 hours.   Bed rest for 3 hours. Constant check for bleeding. Maintain compression band (CB) for 120 minutes past procedure. Remove 3mL of air from CB every 15 minutes until fully deflated. If recurrent bleeding occurs, re-inflate with enough air to fully restore hemostasis (2 to 3 mL, maximum of 18 mL). Maintain CB at this same level for 30 minutes before attempting to re-deflate. Once fully deflated, carefully remove CB and place a sterile dressing over access site.  Observe for one hour, checking pulse every 15 minutes.  Instruct patient not to use or bend their wrist for four hours.     Cardiac rehab. Cardiology follow up. Would suggest to keep guideline-directed medical therapy (GDMT).     Complications:   No    Stents/Implants:   Implants       Stent    Stent, Buzz Austell Phillip, 2.00 X 26rx - [id number] - Implanted        Inventory item: STENT, BUZZ FRONTIER PHILLIP, 2.00 X 26RX Model/Cat number: [id number]    : [employer] Lot number: [id number]    Device identifier: [id number]        As of 9/16/2024       Status: Implanted                      Stent, Navarre Austell Phillip, 2.00 X 15rx - [id number] - Implanted        Inventory item: STENT, BUZZ FRONTIER PHILLIP, 2.00 X 15RX Model/Cat number: [id number]    : [employer] Lot number: [id number]    Device identifier: [id number]        As of 9/16/2024       Status: Implanted                            Anticoagulation/Antiplatelet Plan:   Should be discharged home keeping ASA 81mg daily and Ticagrelor 90mg BID.     Estimated Blood Loss:   5 mL    Anesthesia: Moderate Sedation Anesthesia Staff: No anesthesia staff entered.    Any Specimen(s) Removed:   No specimens collected during this procedure.    Disposition:   Floor    Electronically signed by: Tyshawn Dixon MD, 9/16/2024 8:42 AM

## 2024-09-16 NOTE — PROGRESS NOTES
Subjective Data:  Patient reports feeling well, no new adverse events overnight. Patient denies any chest pain, shortness of breath, palpitations, dizziness or syncope. Patient is hemodynamically stable.     Overnight Events:    No     Objective Data:  Last Recorded Vitals:  Vitals:    09/15/24 0713 09/15/24 1502 09/15/24 2100 09/16/24 0716   BP: 116/72 120/75 138/68 123/81   BP Location: Left arm Left arm Left arm Left arm   Patient Position: Lying Lying Lying Lying   Pulse: 64 63 74 73   Resp: 20  18 15   Temp: 35.7 °C (96.3 °F) 35.9 °C (96.6 °F) 36.8 °C (98.2 °F) 36.4 °C (97.5 °F)   TempSrc: Temporal Temporal Temporal Tympanic   SpO2: 95% 94% 95% 96%   Weight:       Height:           Last Labs:  CBC - 9/16/2024:  4:12 AM  6.7 16.6 202    48.3      CMP - 9/16/2024:  4:12 AM  9.3 5.8 19 --- 0.6   _ 4.0 19 57      PTT - 9/9/2024: 11:06 AM  0.9   10.7 31     TROPHS   Date/Time Value Ref Range Status   09/15/2024 04:44 AM 59 0 - 20 ng/L Final     Comment:     Previous result verified on 9/14/2024 1806 on specimen/case [id number] called with component TRPHS for procedure Troponin I, High Sensitivity, Initial with value 60 ng/L.   09/14/2024 06:43 PM 63 0 - 20 ng/L Final     Comment:     Previous result verified on 9/14/2024 1806 on specimen/case [id number] called with component TRPHS for procedure Troponin I, High Sensitivity, Initial with value 60 ng/L.   09/14/2024 05:27 PM 60 0 - 20 ng/L Final     BNP   Date/Time Value Ref Range Status   09/09/2024 11:07 AM 54 0 - 99 pg/mL Final     HGBA1C   Date/Time Value Ref Range Status   06/18/2024 10:56 AM 7.4 4.3 - 5.6 % Final     Comment:     American Diabetes Association guidelines indicate that patients with HgbA1c in the range 5.7-6.4% are at increased risk for development of diabetes, and intervention by lifestyle modification may be beneficial. HgbA1c greater or equal to 6.5% is considered diagnostic of diabetes.   12/11/2023 09:48 AM 6.8 4.3 - 5.6 % Final      Comment:     American Diabetes Association guidelines indicate that patients with HgbA1c in the range 5.7-6.4% are at increased risk for development of diabetes, and intervention by lifestyle modification may be beneficial. HgbA1c greater or equal to 6.5% is considered diagnostic of diabetes.     LDLCALC   Date/Time Value Ref Range Status   09/09/2024 02:50 PM 59 <=99 mg/dL Final     Comment:                                 Near   Borderline      AGE      Desirable  Optimal    High     High     Very High     0-19 Y     0 - 109     ---    110-129   >/= 130     ----    20-24 Y     0 - 119     ---    120-159   >/= 160     ----      >24 Y     0 -  99   100-129  130-159   160-189     >/=190       VLDL   Date/Time Value Ref Range Status   09/09/2024 02:50 PM 60 0 - 40 mg/dL Final      Last I/O:  I/O last 3 completed shifts:  In: 1270 (14.6 mL/kg) [P.O.:480; I.V.:790 (9.1 mL/kg)]  Out: - (0 mL/kg)   Weight: 87.1 kg     Past Cardiology Tests (Last 3 Years):  EKG:  ECG 12 lead 09/14/2024 (Wet Read)      ECG 12 Lead 09/10/2024      ECG 12 lead 09/09/2024 (Wet Read)      ECG 12 lead 09/09/2024    Echo:  Transthoracic Echo (TTE) Complete 09/10/2024    Ejection Fractions:  EF   Date/Time Value Ref Range Status   09/10/2024 07:37 AM 60 %      Cath:  Cardiac Catheterization Procedure 09/10/2024    Stress Test:  No results found for this or any previous visit from the past 1095 days.    Cardiac Imaging:  No results found for this or any previous visit from the past 1095 days.      Inpatient Medications:  Scheduled medications   Medication Dose Route Frequency    aspirin  81 mg oral Daily    atorvastatin  40 mg oral Nightly    empagliflozin  25 mg oral Daily    enoxaparin  40 mg subcutaneous q24h    insulin lispro  0-20 Units subcutaneous TID    levothyroxine  112 mcg oral Daily    metoprolol succinate XL  25 mg oral Daily    pantoprazole  40 mg oral Daily before breakfast    tamsulosin  0.4 mg oral Daily    ticagrelor  90 mg oral  BID     PRN medications   Medication    acetaminophen    Or    acetaminophen    Or    acetaminophen    dextrose    dextrose    glucagon    glucagon    nitroglycerin     Continuous Medications   Medication Dose Last Rate    lactated Ringer's  75 mL/hr Stopped (09/15/24 1632)       Physical Exam:  General: alert, oriented and in no acute distress  HEENT: NC/AT; EOMI; PERRLA, external ear is normal  Neck: supple; trachea midline; no masses; no JVD  Chest: clear breath sounds bilaterally; no wheezing  Cardio: regular rhythm, S1S2 normal, no murmurs  Abdomen: Soft, non-tender, non-distension, no organomegaly  Extremities: no clubbing/cyanosis/edema. Good healing R radial access site  Neuro: Grossly intact     Psychiatric: Normal mood and affect     Assessment/Plan     Mr. Gael Burnette is a 74 y.o. former smoker diabetic male being consulted by the Cardiology team for chest pain. Patient with prior medical history significant for CAD S/P recent PCI to LAD (and residual lesion to OM), hypertension, diabetes mellitus, hyperlipidemia, hypothyroidism. He presented to ED [facility] on 09/14/2024 complaining of chest pain. Patient states that he had left sided chest pain last week and underwent successful PCI to LAD, with remaining lesion to OM that was scheduled for treatment as outpatient. However, due to new onset chest pain, we have opted to admit the patient and to schedule the PCI to the remaining lesion as inpatient. Initial troponin was 60. Labwork otherwise unremarkable. EKG showed sinus rhythm with incomplete RBBB and non-specific ST-T changes. He was given nitroglycerin with improvement in his pain. Patient was admitted for clinical compensation.      Assessment     # Unstable angina  - Troponin 60.   - EKG showed  sinus rhythm with incomplete RBBB and non-specific ST-T changes.  - We had a thorough conversation with the patient about the natural history of atherosclerosis and the importance for commitment with  healthy lifestyle, including but not limited to healthy diet, regular exercises, avoid sedentary and compliance to medication.   - Left Heart Catheterization (09/10/2024):  Double vessel Coronary Artery Disease  Mid LAD 95% calcified lesion - bifurcation with 1st Dg (70% ostial lesion) and 2nd Dg (50% ostial lesion)   Mid 1st OM 90% calcified lesion  RCA with irregularities  Preserved LV systolic function  S/P Successful PCI to mid LAD (bifurcation with 1st and 2nd Dg) using one XOCHILT, guided by IVUS  - Therefore we will schedule PCI to remaining coronary lesions as inpatient.  - Please keep NPO.   - Keep ASA; Ticagrelor; Atorvastatin 40mg daily; keep ACE-I.  - Keep Metoprolol succinate 25mg daily.  - Follow up with Cardiology clinic upon discharge.     # Hypertension  - Controlled blood pressure.  - Keep current medications including lisinopril 5mg daily.  - Patient counseled to keep a healthy lifestyle including regular exercise and low-sodium diet.  - Recommended home blood pressure monitoring.  - Goal of BP < 130/80mmHg.     # Diabetes  - Counseled on healthy diet and regular exercises.  - Discussed need for weight loss and the benefits.   - Keep current medications including empaglifozin, glipizide.  - ISS.     # Hyperlipidemia  - Keep home medication with Atorvastatin 40mg daily.  - Counseled on healthy diet and regular exercise.      # Hypothyroidism  - Keep Levothyroxine 112mcg daily      Thank you for allowing me to participate in the care of this patient. Please reach me out if you have any questions or if you need any clarifications regarding the patient's care.    Peripheral IV 09/14/24 20 G Right Antecubital (Active)   Site Assessment Clean;Dry;Intact 09/16/24 0600   Line Status Saline locked 09/16/24 0600   Dressing Status Clean;Dry 09/16/24 0600   Number of days: 2       Code Status:  Full Code    Tyshawn Dixon MD  Cardiology

## 2024-09-16 NOTE — PROGRESS NOTES
Troy Burnette is a 74 y.o. male on day 0 of admission presenting with Chest pain.      Subjective   Patient is seen and examined at bedside .  Postcardiac cath he was feeling fine.    Objective     Last Recorded Vitals  /63 (BP Location: Left arm, Patient Position: Lying)   Pulse 64   Temp 36.3 °C (97.3 °F) (Temporal)   Resp 13   Wt 87.1 kg (192 lb)   SpO2 96%   Intake/Output last 3 Shifts:    Intake/Output Summary (Last 24 hours) at 9/16/2024 1356  Last data filed at 9/16/2024 0839  Gross per 24 hour   Intake 910 ml   Output 5 ml   Net 905 ml       Admission Weight  Weight: 87.1 kg (192 lb) (09/14/24 1722)    Daily Weight  09/14/24 : 87.1 kg (192 lb)    Image Results  Electrocardiogram 12 Lead  Normal sinus rhythm  Normal ECG  When compared with ECG of 14-SEP-2024 17:20, (unconfirmed)  No significant change was found  Cardiac Catheterization Procedure           [facility]     [address] [place] [address]  Phone [phone], Fax [phone]    Cardiovascular Catheterization Report    Patient Name:      TROY BURNETTE     Performing Physician:  [id number] Dixon MD  Study Date:        9/10/2024           Verifying Physician:   Purvi Dixon MD  MRN/PID:           [id number]            Cardiologist/Co-Scrub:  Accession#:        [id number]        Ordering Provider:     Purvi DIXON  Date of Birth/Age: 1950 / 74 years Cardiologist:  Gender:            M                   Fellow:  Encounter#:        [id number]          Surgeon:       Study:            Left Heart Cath  Additional Study: PCI - Percutaneous Coronary Intervention  Additional Study: IVUS - Intravascular Ultrasound       Indications:  TROY BURNETTE is a 74 year  old male who presents with hypertension, dyslipidemia, obesity and a chest pain assessment of typical angina. NSTE - ACS.     Appropriate Use Criteria:  Non ST elevation myocardial infarction with high risk score; AUC score = 9.     This report has been modified by [id number] Tyshawn Dixon MD on 9/16/2024 9:01:56 AM due to add data.  Stress test performed: No. CTA performed: No. Norimacario accessed: No. LVEF  Assessed: No.  Cardiac arrest: No.  Cardiac surgical consult: No.  Cardiovascular Instability: No  Frailty status of patient entering lab: 6 = Moderately frail.       Procedure Description:  After infiltration with 2% Lidocaine, the right radial artery was cannulated with a modified Seldinger technique. Subsequently a 6 British sheath was placed in the right radial artery. Selective coronary catheterization was performed using a 5 Fr catheter(s) exchanged over a guide wire to cannulate the coronary arteries. A 5 Fr Tiger catheter was used for left and right coronary artery injections.  Additional catheter(s) used to visualize the coronary arteries were: 6F XB 3.5. Multiple injections of contrast were made into the left and right coronary arteries with angiograms recorded in multiple projections. After completion of the procedure, the arterial sheath was pulled and a TR Band Radial Compression Device was utilized to obtain patent hemostasis.     Coronary Angiography:  The coronary circulation is right dominant.     Left Main Coronary Artery:  The left main coronary artery is a normal caliber vessel. The left main arises normally from the left coronary sinus of Valsalva and bifurcates into the LAD and circumflex coronary arteries. The left main coronary artery showed a normal vessel.     Left Anterior Descending Coronary Artery Distribution:  The left anterior descending coronary artery is a normal caliber vessel. The LAD arises normally from the left main coronary artery. The LAD demonstrated atherosclerotic  disease. The mid left anterior descending coronary artery showed 95% stenosis. This lesion was calcified. The 1st diagonal branch is a normal caliber vessel. The 1st diagonal branch showed atherosclerotic disease. The ostial 1st diagonal branch revealed 70% stenosis. This lesion was irregular. The 2nd diagonal branch is a medium-sized caliber vessel. The 2nd diagonal branch demonstrated atherosclerotic disease. The ostial 2nd diagonal branch showed 50% stenosis. This lesion was irregular.     Circumflex Coronary Artery Distribution:  The circumflex coronary artery is a normal caliber vessel. The circumflex arises normally from the left main coronary artery and terminates in the AV groove. The circumflex revealed no significant disease or stenosis greater than 30%. The 1st obtuse marginal branch is a normal caliber vessel. The 1st obtuse marginal branch showed atherosclerotic disease and calcification. The mid 1st obtuse marginal branch showed 90% stenosis. This lesion was calcified. The 2nd obtuse marginal branch is a small caliber vessel. The 2nd obtuse marginal branch demonstrated no significant disease or stenosis greater than 30%.     Ramus Intermedius:  The ramus intermedius is a normal caliber vessel. The ramus intermedius arises normally from the left main coronary artery. The ramus intermedius showed atherosclerotic disease and calcification. The proximal to mid ramus intermedius showed 95% stenosis.     Right Coronary Artery Distribution:    The right coronary artery is a normal caliber vessel. The RCA arises normally from the right sinus of Valsalva. The RCA showed no significant disease or stenosis greater than 30%. The right posterolateral branch is a normal caliber vessel. The right posterolateral branch showed no significant disease or stenosis greater than 30%. The right posterior descending artery is a normal caliber vessel. The right posterior descending artery showed no significant disease or  stenosis greater than 30%.       Left Ventriculography:  The LV ejection fraction was 60 to 65%. All left ventricular regional wall segments contract normally.     Coronary Interventions:  Angiography reveals a 95% stenosis of the mid left anterior descending coronary artery. Bifurcation with 1st Dg 70% with ostial lesion and 2nd Dg 50% ostial lesion. Pre-intervention JANUARY flow was 3. Percutaneous coronary intervention was performed within the mid left anterior descending. The vessel was pre-dilated using a compliant balloon 2.5 mm x 15 mm at 12 HUONG. Tipton Sheldon drug-eluting stent 2.5 mm x 22 mm was advanced to the lesion and implanted at 12 HUONG. The stent was post dilated using a non-compliant balloon 2.75 mm x 15 mm at 24 HUONG. The stenosis was successfully reduced from 95% to <10%. Post intervention Intravascular Ultrasound (IVUS) was performed within the mid left anterior descending . The stent demonstrated good apposition. No thrombus was visualized. No edge dissection is visualized. Post-intervention JANUARY flow was 3.     This was a bifurcation lesion. Angiography reveals an 70% stenosis of the ostial 1st diagonal. Pre-intervention JANUARY flow was 3. Percutaneous coronary intervention was performed within the ostial 1st diagonal. The vessel was pre-dilated using a compliant balloon 2.5 mm x 15 mm at 8 HUONG. The stenosis was successfully reduced from 70% to <10%. Post-intervention JANUARY flow was 3.     Coronary Lesion Summary:  Vessel         Stenosis   Vessel Segment  LAD          95% stenosis       mid  1st Diagonal 70% stenosis     ostial  2nd Diagonal 50% stenosis     ostial  OM 1         90% stenosis       mid  Ramus        95% stenosis proximal to mid       Hemo Personnel:  +-----------------------------+---------+  Name                         Duty       +-----------------------------+---------+  Tyshawn Tatum MD 1  +-----------------------------+---------+       Hemodynamic  Pressures:     +----+---------------+----------+-------------+--------------+-------+---------+  Site   Date Time   Phase NameSystolic mmHgDiastolic mmHgED mmHgMean mmHg  +----+---------------+----------+-------------+--------------+-------+---------+    LV      9/10/2024  AIR REST          108             0      5                   10:29:47 AM                                                       +----+---------------+----------+-------------+--------------+-------+---------+    LV      9/10/2024  AIR REST          102             1     10                   10:29:55 AM                                                       +----+---------------+----------+-------------+--------------+-------+---------+   LVp      9/10/2024  AIR REST           90            -1      3                   10:30:13 AM                                                       +----+---------------+----------+-------------+--------------+-------+---------+   AOp      9/10/2024  AIR REST           87            46              60          10:30:20 AM                                                       +----+---------------+----------+-------------+--------------+-------+---------+    AO      9/10/2024  AIR REST           73            36              55          10:32:07 AM                                                       +----+---------------+----------+-------------+--------------+-------+---------+    AO      9/10/2024  AIR REST           65            40              50          10:33:01 AM                                                       +----+---------------+----------+-------------+--------------+-------+---------+    AO      9/10/2024  AIR REST           93            53              69          10:40:24 AM                                                        +----+---------------+----------+-------------+--------------+-------+---------+    AO      9/10/2024  AIR REST           99            49              70          10:40:36 AM                                                       +----+---------------+----------+-------------+--------------+-------+---------+    AO      9/10/2024  AIR REST           85            47              64          10:43:54 AM                                                       +----+---------------+----------+-------------+--------------+-------+---------+    AO      9/10/2024  AIR REST           93            44              64          10:49:45 AM                                                       +----+---------------+----------+-------------+--------------+-------+---------+    AO      9/10/2024  AIR REST           95            26              52          10:57:03 AM                                                       +----+---------------+----------+-------------+--------------+-------+---------+       Complications:  No in-lab complications observed.     Cardiac Cath Post Procedure Notes:  Post Procedure Diagnosis: Double vessel disease and XOCHILT of LAD.  Blood Loss:               Estimated blood loss during the procedure was 0 mls.  Specimens Removed:        Number of specimen(s) removed: none.       Recommendations:  Maximize medical therapy.  Agressive risk factor modification efforts.  Follow-up with cardiology clinic.  Anti-platelet therapy with Aspirin and Ticagrelor 90mgs BID.    ____________________________________________________________________________________  CONCLUSIONS:   1. Right coronary artery system dominance.   2. Double vessel Coronary Artery Disease.   3. Mid LAD 95% calcified lesion - bifurcation with 1st Dg (70% ostial lesion) and 2nd Dg (50% ostial lesion).   4. Mid 1st OM 90% calcified lesion.   5. Prox-mid Ramus 95% diffuse lesion.   6.  RCA with irregularities.   7. Preserved LV systolic function.   8. The ramus intermedius showed atherosclerotic disease and calcification.   9. S/P Successful PCI to mid LAD (bifurcation with 1st and 2nd Dg) using one XOCHILT, guided by IVUS.    ICD 10 Codes:  Non ST elevation (NSTEMI) myocardial infarction-I21.4     CPT Codes:  Left Heart Cath (visualization of coronaries) and LV-28775; Stent w angioplasty Left Anterior Descending single major Artery branch (PCI)-40550.LD; IVUS, Left Anterior Descending initial Vessel (PCI)-20656.LD; IVUS, Left Main ea add'l Vessel (PCI)-03425.LM; Moderate Sedation Services initial 15 minutes patient >5 years-07991; Moderate Sedation Services 1st additional 15 minutes patient >5 years-30919     [id number] Dixon MD  Performing Physician  Electronically signed by Purvi Dixon MD on 9/10/2024 at  12:44:33 PM         ** Final (Updated) **  Cardiac Catheterization Procedure           [facility]     [address] [place] [address]  Phone [phone], Fax [phone]    Cardiovascular Catheterization Report    Patient Name:      TROY CARRANZA     Performing Physician:  Purvi Dixon MD  Study Date:        9/16/2024           Verifying Physician:   Purvi Dixon MD  MRN/PID:           [id number]            Cardiologist/Co-Scrub:  Accession#:        [id number]        Ordering Provider:     Purvi DIXON  Date of Birth/Age: 1950 / 74 years Cardiologist:  Gender:            M                   Fellow:  Encounter#:        [id number]          Surgeon:       Study:            Left Heart Cath  Additional Study: PCI - Percutaneous Coronary Intervention  Additional Study: IVUS -  Intravascular Ultrasound       Indications:  TROY CARRANZA is a 74 year old male who presents with hypertension, diabetes, dyslipidemia, prior myocardial infarction, prior percutaneous coronary intervention and a chest pain assessment of typical angina. NSTE - ACS.     Appropriate Use Criteria:  Unstable angina with high risk score; AUC score = 9.  Stress test performed: No. CTA performed: No. [place]at[place] accessed: No. LVEF  Assessed: Yes. LVEF = 55%.  Cardiac arrest: No.  Cardiac surgical consult: No.  Cardiovascular Instability: No  Frailty status of patient entering lab: 6 = Moderately frail.       Procedure Description:  After infiltration with 2% Lidocaine, the right radial artery was cannulated with a modified Seldinger technique. Subsequently a 6 Uzbek sheath was placed in the right radial artery. Selective coronary catheterization was performed using a 6 Fr catheter(s) exchanged over a guide wire to cannulate the coronary arteries. A 6F XB 3.5 tip catheter was used for left coronary injections.  Multiple injections of contrast were made into the left coronary arteries with angiograms recorded in multiple projections. After completion of the procedure, the arterial sheath was pulled and a TR Band Radial Compression Device was utilized to obtain patent hemostasis.     Coronary Angiography:  The coronary circulation is right dominant.     Left Main Coronary Artery:  The left main coronary artery is a normal caliber vessel. The left main arises normally from the left coronary sinus of Valsalva. The left main coronary artery showed a normal vessel.     Left Anterior Descending Coronary Artery Distribution:  The left anterior descending coronary artery is a normal caliber vessel. The LAD arises normally from the left main coronary artery. The LAD demonstrated no significant disease or stenosis greater than 30%.     Circumflex Coronary Artery Distribution:  The circumflex coronary artery is a normal caliber vessel.  The circumflex arises normally from the left main coronary artery. The circumflex revealed no significant disease or stenosis greater than 30%. The 1st obtuse marginal branch is a normal caliber vessel. The 1st obtuse marginal branch showed atherosclerotic disease. The mid 1st obtuse marginal branch showed 90% stenosis. This lesion was irregular. The 2nd obtuse marginal branch is a normal caliber vessel. The 2nd obtuse marginal branch demonstrated atherosclerotic disease. The proximal 2nd obtuse marginal branch demonstrated 50% stenosis. This lesion was irregular.     Ramus Intermedius:  The ramus intermedius is a normal caliber vessel. The ramus intermedius arises normally from the left main coronary artery. The ramus intermedius showed atherosclerotic disease and calcification. The proximal to mid ramus intermedius showed 95% stenosis.     Coronary Interventions:  Angiography reveals a 95% stenosis of the proximal to mid ramus intermedius coronary artery. Pre-intervention JANUARY flow was 3. Percutaneous coronary intervention was performed within the proximal to mid ramus intermedius. The vessel was pre-dilated using a compliant balloon 2.0 mm x 15 mm at 12 HUONG. Everest Erick drug-eluting stent 2.0 mm x 26 mm was advanced to the lesion and implanted at 12 HUONG. The stent was post dilated using a non-compliant balloon 2.5 mm x 15 mm at 15 HUONG. The stenosis was successfully reduced from 95% to <10%. Post intervention Intravascular Ultrasound (IVUS) was performed within the proximal to mid ramus intermedius . The stent demonstrated good apposition. No thrombus was visualized. No edge dissection is visualized. Post-intervention JANUARY flow was 3.     Angiography reveals an 90% stenosis of the mid 1st obtuse marginal. Pre-intervention JANUARY flow was 3. Percutaneous coronary intervention was performed within the mid 1st obtuse marginal. The vessel was pre-dilated using a compliant balloon 2.0 mm x 12 mm at 12 HUONG. Everest  Harrah drug-eluting stent 2.0 mm x 15 mm was advanced to the lesion and implanted at 12 HUONG. The stent was post dilated using a non-compliant balloon 2.5 mm x 15 mm at 12 HUONG. The stenosis was successfully reduced from 90% to <10%. Post intervention Intravascular Ultrasound (IVUS) was performed within the mid 1st obtuse marginal . The stent demonstrated good apposition. No thrombus was visualized. No edge dissection is visualized. Post-intervention JANUARY flow was 3.     Coronary Lesion Summary:  Vessel   Stenosis   Vessel Segment  OM 1   90% stenosis       mid  OM 2   50% stenosis    proximal  Ramus  95% stenosis proximal to mid       Complications:  No in-lab complications observed.     Cardiac Cath Post Procedure Notes:  Post Procedure           Double vessel disease, XOCHILT of Circumflex and XOCHILT of  Diagnosis:               Ramus.  Blood Loss:              Estimated blood loss during the procedure was 0 mls.  Specimens Removed:       Number of specimen(s) removed: none.       Recommendations:  Maximize medical therapy.  Agressive risk factor modification efforts.  Follow-up with cardiology clinic.  Anti-platelet therapy with Aspirin and Ticagrelor 90mgs BID.    ____________________________________________________________________________________  CONCLUSIONS:   1. Double vessel coronary artery disease.   2. Patent stent to mid LAD.   3. Mid 1st OM 90% calcified lesion.   4. Prox-mid Ramus 95% diffuse lesion.   5. S/P Successful PCI to prox-mid ramus using one XOCHILT, guided by IVUS.   6. S/P Successful PCI to mid 1st OM (bifurcation lesion) using one XOCHILT, guided by IVUS.    ICD 10 Codes:  Unstable angina-I20.0     CPT Codes:  Left Heart Cath (visualization of coronaries) and LV-59094; Stent w angioplasty Left Circumflex single major Artery branch (PCI)-44187.LC; Stent Ramus InteRIedius ea addl branch of major Artery (PCI)-55445.RI; IVUS, Left Circumflex initial Vessel (PCI)-72904.LC; IVUS, Ramus InteRIedius ea add'l Vessel  (PCI)-41065.RI; IVUS, Left Main ea add'l Vessel (PCI)-88921.LM; Moderate Sedation Services initial 15 minutes patient >5 years-99654     [id number] Tyshawn Dixon MD  Performing Physician  Electronically signed by [id number] Tyshawn Dixon MD on 9/16/2024 at  8:59:34 AM         ** Final **  ECG 12 lead  Normal sinus rhythm  Normal ECG      Physical Exam  Constitutional:       Appearance: Normal appearance.   HENT:      Head: Normocephalic.      Nose: Nose normal.      Mouth/Throat:      Mouth: Mucous membranes are moist.   Eyes:      Pupils: Pupils are equal, round, and reactive to light.   Cardiovascular:      Rate and Rhythm: Normal rate.      Pulses: Normal pulses.   Pulmonary:      Effort: Pulmonary effort is normal.   Abdominal:      Palpations: Abdomen is soft.   Musculoskeletal:         General: Normal range of motion.      Cervical back: Normal range of motion.   Skin:     General: Skin is dry.      Capillary Refill: Capillary refill takes less than 2 seconds.   Neurological:      General: No focal deficit present.      Mental Status: He is alert.         Relevant Results             Scheduled medications  aspirin, 81 mg, oral, Daily  atorvastatin, 40 mg, oral, Nightly  empagliflozin, 25 mg, oral, Daily  enoxaparin, 40 mg, subcutaneous, q24h  insulin lispro, 0-20 Units, subcutaneous, TID  levothyroxine, 112 mcg, oral, Daily  metoprolol succinate XL, 25 mg, oral, Daily  pantoprazole, 40 mg, oral, Daily before breakfast  tamsulosin, 0.4 mg, oral, Daily  ticagrelor, 90 mg, oral, BID      Continuous medications  lactated Ringer's, 75 mL/hr, Last Rate: 75 mL/hr (09/16/24 1210)      PRN medications  PRN medications: acetaminophen **OR** acetaminophen **OR** acetaminophen, dextrose, dextrose, glucagon, glucagon, nitroglycerin    XR chest 1 view    Result Date: 9/14/2024  Interpreted By:  Venancio Montiel, STUDY: XR CHEST 1 VIEW;  9/14/2024 5:34 pm   INDICATION: Signs/Symptoms:Chest Pain.   COMPARISON:  09/09/2024   ACCESSION NUMBER(S): [id number]   ORDERING CLINICIAN: UMER SCHMITZ   FINDINGS: The lungs are clear without apparent pleural effusion. Normal heart size, mediastinum, dominique, and pulmonary vasculature.       No active disease in the chest.   MACRO: None   Signed by: Venancio Montiel 9/14/2024 5:40 PM Dictation workstation:   [id number]    ECG 12 Lead    Result Date: 9/10/2024  Normal sinus rhythm Nonspecific intraventricular conduction delay Borderline ECG When compared with ECG of 09-SEP-2024 10:52, (unconfirmed) Nonspecific intraventricular conduction delay has replaced Incomplete right bundle branch block Confirmed by Tyshawn Tatum ([id number]) on 9/10/2024 4:39:58 PM    5     Assessment/Plan   This patient currently has cardiac telemetry ordered; if you would like to modify or discontinue the telemetry order, click here to go to the orders activity to modify/discontinue the order.  74-year-old male with a past medical history of coronary artery disease status post stent, T2 diabetes mellitus, hypothyroidism, hyperlipidemia, hypertension, peripheral neuropathy presents with complaints of unstable angina    Assessment  Unstable angina  Troponin elevation  Type 2 diabetes mellitus  Hypothyroidism  Ex-smoker  Hypertension    Plan  Patient s/p cardiac cath.  Doing fine  His creatinine is elevated  Will continue IV hydration for today  Recheck creatinine tomorrow before discharge  Continue to evaluate the cardiac cath site  Restart diet  Continue to watch urine output  Continue aspirin and Brilinta  Continue other home medications, continue to hold lisinopril  Fingerstick glucose ACHS , sugars stable  CBC, BMP tomorrow  Plan dc tomorrow  DVT prophylaxis: Lovenox  GI prophylaxis Protonix  Full code          Rina Bolden MD

## 2024-09-16 NOTE — NURSING NOTE
Pt moved from cath lab holding to room 336 to complete PCI recovery care:     Home going instructions specific to procedure given: Not at this time    1 Easily Arousable; Responding Appropriately: Yes    2. VS +/- 20% of preprocedure status: Yes     3. Significant complications are absent: Yes    4. Ambulates without dizziness/Age appropriate activity: Yes    5. Pulse Ox great than or equal to 92% or above on room air /ordered oxygen treatment: Yes    6. Care Plan Complete: Yes    7. Discharge education completed and information provided to patient and family: Not at this time    8. If Patient had PCI performed-Stent card sent home with Patient: Yes    Report given to Kerry MORILLO

## 2024-09-16 NOTE — DISCHARGE INSTRUCTIONS
CARDIAC CATHETERIZATION DISCHARGE INSTRUCTIONS     FOR SUDDEN AND SEVERE CHEST PAIN, SHORTNESS OF BREATH, EXCESSIVE BLEEDING, SIGNS OF STROKE, OR CHANGES IN MENTAL STATUS YOU SHOULD CALL 911 IMMEDIATELY.     If your provider has prescribed aspirin and/or clopidogrel (Plavix), or prasugrel (Effient), or ticagrelor (Brilinta), DO NOT STOP THESE MEDICATIONS for any reason without talking to your cardiologist first. If any of these were prescribed, you must take them every day without missing a single dose. If you are getting low on these medications, contact your provider immediately for a refill.     FOR NEXT 24 HOURS  - Upon discharge, you should return home and rest for the remainder of the day and evening. You do not have to stay on bed rest but should not be very active.  It is recommended a responsible adult be with you for the first 24 hours after the procedure.    - No driving for 24 hours after procedure. Please arrange for someone to drive you home from the hospital today.     - Do not drive, operate machinery, or use power tools for 24 hours after your procedure.     - Do not make any legal decisions for 24 hours after your procedure.     - Do not drink alcoholic beverages for 24 hours after your procedure.    WOUND CARE   *FOR FEMORAL (LEG) ACCESS*  ·      Avoid heavy lifting (over 10 pounds) for 7 days, squatting or excessive bending for 2 days, and strenuous exercise for 7 days.  ·      No submerged bathing, swimming, or hot tubs for the next 7 days, or until fully healed.  ·      Avoid sexual activity for 3-4 days until any groin discomfort has ceased.     *FOR RADIAL (WRIST) ACCESS*  ·      No lifting more than 5 pounds or excessive use of the wrist for 24 hours - for example, treat your wrist as if it is sprained.  ·      Do not engage in vigorous activities (tennis, golf, bowling, weights) for at least 48 hours after the procedure.  ·      Do not submerge the wrist for 7 days after the  procedure.  ·      You should expect mild tingling in your hand and tenderness at the puncture site for up to 3 days.    - The transparent dressing should be removed from the site 24 hours after the procedure.  Wash the site gently with soap and water. Rinse well and pat dry. Keep the area clean and dry. You may apply a Band-Aid to the site. Avoid lotions, ointments, or powders until fully healed.     - You may shower the day after your procedure.      - It is normal to notice a small bruise around the puncture site and/or a small grape sized or smaller lump. Any large bruising or large lump warrants a call to the office.     - If bleeding should occur, lay down and apply pressure to the affected area for 10 minutes.  If the bleeding stops notify your physician.  If there is a large amount of bleeding or spurting of blood CALL 911 immediately.  DO NOT drive yourself to the hospital.    - You may experience some tenderness, bruising or minimal inflammation.  If you have any concerns, you may contact the Cath Lab or if any of these symptoms become excessive, contact your cardiologist or go to the emergency room.     OTHER INSTRUCTIONS  - You may take acetaminophen (Tylenol) as directed for discomfort.  If pain is not relieved with acetaminophen (Tylenol), contact your doctor.    - If you notice or experience any of the following, you should notify your doctor or seek medical attention  Chest pain or discomfort  Change in mental status or weakness in extremities.  Dizziness, light headedness, or feeling faint.  Change in the site where the procedure was performed, such as bleeding or an increased area of bruising or swelling.  Tingling, numbness, pain, or coolness in the leg/arm beyond the site where the procedure was performed.  Signs of infection (i.e. shaking chills, temperature > 100 degrees Fahrenheit, warmth, redness) in the leg/arm area where the procedure was performed.  Changes in urination   Bloody or black  stools  Vomiting blood  Severe nose bleeds  Any excessive bleeding    - If you DO NOT have an appointment with your cardiologist within 2-4 weeks following your procedure, please contact their office.      Lifestyle Recommendations for a Healthier Life:    The following lifestyle changes can have a significant positive impact on your overall health - helping you to achieve healthy weight, lower metabolic and heart disease risk and manage stress more effectively:      1. Eat whole, fresh foods. Food is one of the biggest drivers of our overall health.  Make your meals whole foods based, focusing on a wide variety of non starchy vegetables and fruits.  It also beneficial to include various nuts, seeds and high-quality animal proteins for overall balanced diet.    2. Remove the sweeteners from your diet.  Artificial sweeteners have a negative impact on our metabolic health - they can cause increase in both glucose and insulin, increasing the risk or potential for insulin resistance and abnormal blood sugar levels.  Artificial sweeteners are also excessively sweeter than regular sugar, they trick our taste buds into craving extreme forms of sweet, like an addiction.  I encourage you to avoid sugar, but also stevia, aspartame, sucralose, sugar alcohols like xylitol and maltitol.    3. Get rid of the inflammatory factors in your diet - this mainly comes from sugars of all kinds and refined vegetable oils.  These can increase our risk for changes in our metabolic health which can lead to diabetes, heart disease and other chronic disease.  You can reverse or combat the effective of inflammatory foods by increasing your intake of anti-inflammatory foods like wild-caught fish, fresh ground flax seed, fish oil and variety of fruits & vegetables.      4. Eat plenty of fiber!!  The standard American diet has very low levels of daily dietary fiber, many people barely get 15 grams per day.  There is plenty of nutrition research  that shows how high-fiber foods can help lower our blood sugar.   Eat a wide variety of fiber-rich plant-based foods including nuts, seeds, fruits, vegetables, and legumes.    5. Get enough sleep. Studies from experts in endocrinology & metabolism have shown that even a few nights of poor sleep can increase the risk of insulin resistance and abnormal blood sugar values. Make sleep a priority - it is an important factor in your health.  Develop a sleep routine including: avoid eating two-three hours before bed, practice relaxation techniques (warm bath, meditation, yoga, mindfulness) to help relax your muscles and prepare you for sleep.    Go to bed and wake up at consistent times.  Avoid screen time for at least 60-90 minutes before bedtime.    6. Make exercise part of your regular routine.  Exercise helps us manage blood sugar more effectively and makes our cells more receptive to the effect of the insulin our body makes (lowers blood sugar).  Regular aerobic exercise AND interval or strength training are ideal to help maintain a healthy weight, metabolism & lower the risk of chronic disease.      7. Control stress levels. Chronic stress can lead to elevated blood sugar, elevated blood pressure, accumulation of fat around the belly and these things increase the risk for metabolic syndrome, diabetes and heart disease.  We all have various levels of stress in our lives, we can't control all of it, but we can control how we respond to it and manage it's impact.  Find healthy outlets for stress management like meditation, yoga, deep breathing, or exercise.    Home BP monitoring instructions:   Goal < 130 / 80   Remain still, Avoid smoking, caffeinated beverages, or exercise within 30 min before BP measurements.  Ensure 5 min of quiet rest before BP measurements.  Sit correctly with back straight and supported (on a straight-backed dining chair, for example, rather than a sofa).  Sit with feet flat on the floor and legs  uncrossed.  Keep arm supported on a flat surface (such as a table), with the upper arm at heart level.  Bottom of the cuff should be placed directly above the bend of the elbow  Take a reading in the AM before breakfast 2-3 times / week   Record all readings accurately:  A written log should be brought to all appointments   Monitors with built-in memory should be brought to all clinic appointments and calibrated to our machines      Weight Management:  Since food equals calories, in order to lose weight you must either eat fewer calories, exercise more to burn off calories with activity, or both. Food that is not used to fuel the body is stored as fat.    A major component of losing weight is to make smarter food choices. Here's how:    Limit non-nutritious foods, such as:  Sugar, honey, syrups and candy  Pastries, donuts, pies, cakes and cookies  Soft drinks, sweetened juices and alcoholic beverages    Cut down on high-fat foods by:  Choosing poultry, fish or lean red meat  Choosing low-fat cooking methods, such as baking, broiling, steaming, grilling and boiling  Using low-fat or non-fat dairy products  Using vinaigrette, herbs, lemon or fat-free salad dressings  Avoiding fatty meats, such as garcia, sausage, pham, ribs and luncheon meats  Avoiding high-fat snacks like nuts, chips and chocolate  Avoiding fried foods  Using less butter, margarine, oil and mayonnaise  Avoiding high-fat gravies, cream sauces and cream-based soups    Eat a variety of foods, including:  Fruit and vegetables that are raw, steamed or baked  Whole grains, breads, cereal, rice and pasta  Dairy products, such as low-fat or non-fat milk or yogurt, low-fat cottage cheese and low-fat cheese  Protein-rich foods like chicken, turkey, fish, lean meat and legumes, or beans    Change your eating habits:  Eat three balanced meals a day to help control your hunger  Watch portion sizes and eat small servings of a variety of foods  Choose low-calorie  snacks  Eat only when you are hungry and stop when you are satisfied  Eat slowly and try not to perform other tasks while eating  Find other activities to distract you from food, such as walking, taking up a hobby or being involved in the community  Include regular exercise in your daily routine  Find a support group, if necessary, for emotional support in your weight loss effort    Patient Education: Smoking Cessation  Making the commitment to quit smoking is one of the best choices that smokers can make for themselves, but also a difficult one. There are various opportunities for support available. Here is an overview of them.  There are various forms of nicotine replacement therapy available to assist with minimizing these symptoms. They are nicotine gum, nicotine patch, nicotine nasal spray, nicotine inhaler, and nicotine lozenge.  Which kind of nicotine replacement therapy will best work for you can be discussed with your doctor or nurse to help you understand the pros and cons of each.  Non-nicotine replacement therapy is also available. Bupropion (Wellbutrin, Zyban) is a mild anti-depressant that is also effective in helping people to quit smoking. It can be used alone or with nicotine replacement therapy.   Varenicline (Chantix) is another medication that helps people who what to quit. This medication is not a form of nicotine replacement therapy, but it helps with the withdrawal symptoms.  Studies have shown that the best success rates for people trying to quit include counseling and/or support groups such as the National Hel[employer] that is a free, confidential, 24/7, 365-day-a-year treatment referral and information service:  4-059-258-HELP (4972)    Some helpful hints include:  Avoidance. Stay away from smokers and places where you are tempted to smoke.  Activities. Exercise or do hobbies that keep your hands busy.  Alternatives. Use oral substitutes such as sugarless gum, hard candy, and carrot  sticks.  Change of habits. For example, if you usually smoke during a coffee break, then go for a walk instead.  Deep breathing. When you have the urge to smoke, do a deep breathing exercise and remind yourself why you quit.  Delay tactic. If you feel that you are about to light up, delay. Tell yourself you must wait 10 minutes. Often this simple trick will allow you to move beyond the urge  Discussion. Call a friend for support.  Drink of water. Use as an oral substitute such as water.  Many people say the reason they smoke is to deal with stress. Unfortunately, stress is a part of all our lives. When quitting, you will need to find new strategies to deal with stress. There are stress-management classes, and self-help books that can help you discover new ways to reduce and deal with stress.  The bottom line is: don't just try to go it alone-reach out for support to help you achieve your goal.

## 2024-09-16 NOTE — TREATMENT PLAN
Patient underwent successful PCI with 2 XOCHILT placed to the prox-mid ramus and mid 1st OM reducing that lesion down to 0% stenosis. Patient tolerated the procedure well.  Please see procedural report for complete details.  Patient will continue DAPT with aspirin 81 mg daily and ticagrelor 90mg twice daily for 1 year and then ASA 81mg indefinitely. Patient will be scheduled to follow up with Dr. Dixon in two weeks. Communication provided to hospitalist.

## 2024-09-17 VITALS
BODY MASS INDEX: 26.88 KG/M2 | RESPIRATION RATE: 18 BRPM | DIASTOLIC BLOOD PRESSURE: 73 MMHG | WEIGHT: 192 LBS | SYSTOLIC BLOOD PRESSURE: 132 MMHG | HEIGHT: 71 IN | OXYGEN SATURATION: 100 % | TEMPERATURE: 96.6 F | HEART RATE: 82 BPM

## 2024-09-17 LAB
ANION GAP SERPL CALC-SCNC: 11 MMOL/L (ref 10–20)
BUN SERPL-MCNC: 22 MG/DL (ref 6–23)
CALCIUM SERPL-MCNC: 8.9 MG/DL (ref 8.6–10.3)
CHLORIDE SERPL-SCNC: 105 MMOL/L (ref 98–107)
CO2 SERPL-SCNC: 26 MMOL/L (ref 21–32)
CREAT SERPL-MCNC: 1.34 MG/DL (ref 0.5–1.3)
EGFRCR SERPLBLD CKD-EPI 2021: 56 ML/MIN/1.73M*2
ERYTHROCYTE [DISTWIDTH] IN BLOOD BY AUTOMATED COUNT: 12.1 % (ref 11.5–14.5)
GLUCOSE SERPL-MCNC: 142 MG/DL (ref 74–99)
HCT VFR BLD AUTO: 47.8 % (ref 41–52)
HGB BLD-MCNC: 16.4 G/DL (ref 13.5–17.5)
MCH RBC QN AUTO: 31.3 PG (ref 26–34)
MCHC RBC AUTO-ENTMCNC: 34.3 G/DL (ref 32–36)
MCV RBC AUTO: 91 FL (ref 80–100)
NRBC BLD-RTO: 0 /100 WBCS (ref 0–0)
PLATELET # BLD AUTO: 201 X10*3/UL (ref 150–450)
POTASSIUM SERPL-SCNC: 3.9 MMOL/L (ref 3.5–5.3)
RBC # BLD AUTO: 5.24 X10*6/UL (ref 4.5–5.9)
SODIUM SERPL-SCNC: 138 MMOL/L (ref 136–145)
WBC # BLD AUTO: 7.7 X10*3/UL (ref 4.4–11.3)

## 2024-09-17 PROCEDURE — 2500000004 HC RX 250 GENERAL PHARMACY W/ HCPCS (ALT 636 FOR OP/ED): Performed by: INTERNAL MEDICINE

## 2024-09-17 PROCEDURE — 99238 HOSP IP/OBS DSCHRG MGMT 30/<: CPT | Performed by: STUDENT IN AN ORGANIZED HEALTH CARE EDUCATION/TRAINING PROGRAM

## 2024-09-17 PROCEDURE — 80048 BASIC METABOLIC PNL TOTAL CA: CPT | Performed by: STUDENT IN AN ORGANIZED HEALTH CARE EDUCATION/TRAINING PROGRAM

## 2024-09-17 PROCEDURE — 99291 CRITICAL CARE FIRST HOUR: CPT | Performed by: STUDENT IN AN ORGANIZED HEALTH CARE EDUCATION/TRAINING PROGRAM

## 2024-09-17 PROCEDURE — 85027 COMPLETE CBC AUTOMATED: CPT | Performed by: STUDENT IN AN ORGANIZED HEALTH CARE EDUCATION/TRAINING PROGRAM

## 2024-09-17 PROCEDURE — 36415 COLL VENOUS BLD VENIPUNCTURE: CPT | Performed by: STUDENT IN AN ORGANIZED HEALTH CARE EDUCATION/TRAINING PROGRAM

## 2024-09-17 PROCEDURE — 2500000001 HC RX 250 WO HCPCS SELF ADMINISTERED DRUGS (ALT 637 FOR MEDICARE OP): Performed by: INTERNAL MEDICINE

## 2024-09-17 RX ADMIN — SODIUM CHLORIDE, POTASSIUM CHLORIDE, SODIUM LACTATE AND CALCIUM CHLORIDE 75 ML/HR: 600; 310; 30; 20 INJECTION, SOLUTION INTRAVENOUS at 01:22

## 2024-09-17 RX ADMIN — LEVOTHYROXINE SODIUM 112 MCG: 112 TABLET ORAL at 06:46

## 2024-09-17 ASSESSMENT — PAIN - FUNCTIONAL ASSESSMENT
PAIN_FUNCTIONAL_ASSESSMENT: 0-10

## 2024-09-17 ASSESSMENT — PAIN SCALES - GENERAL
PAINLEVEL_OUTOF10: 0 - NO PAIN

## 2024-09-17 ASSESSMENT — ACTIVITIES OF DAILY LIVING (ADL): LACK_OF_TRANSPORTATION: NO

## 2024-09-17 NOTE — NURSING NOTE
Discharge Note: 9/17/2024 1030 Discharged via wheelchair to private car accompanied by RN, personal belongings taken with pt, no distress noted, no complaints voiced. Liliana MORILLO

## 2024-09-17 NOTE — PROGRESS NOTES
Subjective Data:  Patient reports feeling well, no new adverse events overnight. Patient denies any chest pain, shortness of breath, palpitations, dizziness or syncope. Patient is hemodynamically stable.     Overnight Events:    No     Objective Data:  Last Recorded Vitals:  Vitals:    09/17/24 0330 09/17/24 0345 09/17/24 0400 09/17/24 0415   BP:   135/71    BP Location:       Patient Position:       Pulse: 74 61 60 66   Resp: 14 12 14 15   Temp:    36.3 °C (97.3 °F)   TempSrc:       SpO2:       Weight:       Height:           Last Labs:  CBC - 9/17/2024:  5:30 AM  7.7 16.4 201    47.8      CMP - 9/17/2024:  5:30 AM  8.9 5.8 19 --- 0.6   _ 4.0 19 57      PTT - 9/9/2024: 11:06 AM  0.9   10.7 31     TROPHS   Date/Time Value Ref Range Status   09/15/2024 04:44 AM 59 0 - 20 ng/L Final     Comment:     Previous result verified on 9/14/2024 1806 on specimen/case [id number] called with component TRPHS for procedure Troponin I, High Sensitivity, Initial with value 60 ng/L.   09/14/2024 06:43 PM 63 0 - 20 ng/L Final     Comment:     Previous result verified on 9/14/2024 1806 on specimen/case [id number] called with component TRPHS for procedure Troponin I, High Sensitivity, Initial with value 60 ng/L.   09/14/2024 05:27 PM 60 0 - 20 ng/L Final     BNP   Date/Time Value Ref Range Status   09/09/2024 11:07 AM 54 0 - 99 pg/mL Final     HGBA1C   Date/Time Value Ref Range Status   06/18/2024 10:56 AM 7.4 4.3 - 5.6 % Final     Comment:     American Diabetes Association guidelines indicate that patients with HgbA1c in the range 5.7-6.4% are at increased risk for development of diabetes, and intervention by lifestyle modification may be beneficial. HgbA1c greater or equal to 6.5% is considered diagnostic of diabetes.   12/11/2023 09:48 AM 6.8 4.3 - 5.6 % Final     Comment:     American Diabetes Association guidelines indicate that patients with HgbA1c in the range 5.7-6.4% are at increased risk for development of diabetes,  and intervention by lifestyle modification may be beneficial. HgbA1c greater or equal to 6.5% is considered diagnostic of diabetes.     LDLCALC   Date/Time Value Ref Range Status   09/09/2024 02:50 PM 59 <=99 mg/dL Final     Comment:                                 Near   Borderline      AGE      Desirable  Optimal    High     High     Very High     0-19 Y     0 - 109     ---    110-129   >/= 130     ----    20-24 Y     0 - 119     ---    120-159   >/= 160     ----      >24 Y     0 -  99   100-129  130-159   160-189     >/=190       VLDL   Date/Time Value Ref Range Status   09/09/2024 02:50 PM 60 0 - 40 mg/dL Final      Last I/O:  I/O last 3 completed shifts:  In: 2166.1 (24.9 mL/kg) [P.O.:600; I.V.:1566.1 (18 mL/kg)]  Out: 5 (0.1 mL/kg) [Blood:5]  Weight: 87.1 kg     Past Cardiology Tests (Last 3 Years):  EKG:  Electrocardiogram 12 Lead 09/16/2024 (Preliminary)      ECG 12 lead 09/14/2024 (Preliminary)      ECG 12 Lead 09/10/2024      ECG 12 lead 09/09/2024 (Wet Read)      ECG 12 lead 09/09/2024    Echo:  Transthoracic Echo (TTE) Complete 09/10/2024    Ejection Fractions:  EF   Date/Time Value Ref Range Status   09/10/2024 07:37 AM 60 %      Cath:  Cardiac Catheterization Procedure 09/16/2024      Cardiac Catheterization Procedure 09/10/2024    Stress Test:  No results found for this or any previous visit from the past 1095 days.    Cardiac Imaging:  No results found for this or any previous visit from the past 1095 days.      Inpatient Medications:  Scheduled medications   Medication Dose Route Frequency    aspirin  81 mg oral Daily    atorvastatin  40 mg oral Nightly    empagliflozin  25 mg oral Daily    enoxaparin  40 mg subcutaneous q24h    insulin lispro  0-20 Units subcutaneous TID    levothyroxine  112 mcg oral Daily    metoprolol succinate XL  25 mg oral Daily    pantoprazole  40 mg oral Daily before breakfast    tamsulosin  0.4 mg oral Daily    ticagrelor  90 mg oral BID     PRN medications   Medication     acetaminophen    Or    acetaminophen    Or    acetaminophen    dextrose    dextrose    glucagon    glucagon    nitroglycerin     Continuous Medications   Medication Dose Last Rate    lactated Ringer's  75 mL/hr 75 mL/hr (09/17/24 0604)       Physical Exam:  General: alert, oriented and in no acute distress  HEENT: NC/AT; EOMI; PERRLA, external ear is normal  Neck: supple; trachea midline; no masses; no JVD  Chest: clear breath sounds bilaterally; no wheezing  Cardio: regular rhythm, S1S2 normal, no murmurs  Abdomen: Soft, non-tender, non-distension, no organomegaly  Extremities: no clubbing/cyanosis/edema. Good healing R radial access site  Neuro: Grossly intact     Psychiatric: Normal mood and affect     Assessment/Plan     Mr. Gael Burnette is a 74 y.o. former smoker diabetic male being consulted by the Cardiology team for chest pain. Patient with prior medical history significant for CAD S/P recent PCI to LAD (and residual lesion to OM), hypertension, diabetes mellitus, hyperlipidemia, hypothyroidism. He presented to ED [facility] on 09/14/2024 complaining of chest pain. Patient states that he had left sided chest pain last week and underwent successful PCI to LAD, with remaining lesion to OM that was scheduled for treatment as outpatient. However, due to new onset chest pain, we have opted to admit the patient and to schedule the PCI to the remaining lesion as inpatient. Initial troponin was 60. Labwork otherwise unremarkable. EKG showed sinus rhythm with incomplete RBBB and non-specific ST-T changes. He was given nitroglycerin with improvement in his pain. Patient was admitted for clinical compensation.      Assessment     # Unstable angina  - Troponin 60.   - EKG showed  sinus rhythm with incomplete RBBB and non-specific ST-T changes.  - We had a thorough conversation with the patient about the natural history of atherosclerosis and the importance for commitment with healthy lifestyle, including but not  limited to healthy diet, regular exercises, avoid sedentary and compliance to medication.   - Left Heart Catheterization (09/10/2024):  Double vessel Coronary Artery Disease  Mid LAD 95% calcified lesion - bifurcation with 1st Dg (70% ostial lesion) and 2nd Dg (50% ostial lesion)   Mid 1st OM 90% calcified lesion  RCA with irregularities  Preserved LV systolic function  S/P Successful PCI to mid LAD (bifurcation with 1st and 2nd Dg) using one XOCHILT, guided by IVUS  - Left Heart Catheterization (09/10/2024):  Patent stent to mid LAD  Mid 1st OM 90% calcified lesion  Prox-mid Ramus 95% diffuse lesion  S/P Successful PCI to prox-mid ramus using one XOCHILT, guided by IVUS  S/P Successful PCI to mid 1st OM (bifurcation lesion) using one XOCHILT, guided by IVUS  - Keep ASA; Ticagrelor; Atorvastatin 40mg daily; keep ACE-I.  - Keep Metoprolol succinate 25mg daily.  - Cardiac rehab.  - Follow up with Cardiology clinic upon discharge.     # Hypertension  - Controlled blood pressure.  - Keep current medications including lisinopril 5mg daily.  - Patient counseled to keep a healthy lifestyle including regular exercise and low-sodium diet.  - Recommended home blood pressure monitoring.  - Goal of BP < 130/80mmHg.     # Diabetes  - Counseled on healthy diet and regular exercises.  - Discussed need for weight loss and the benefits.   - Keep current medications including empaglifozin, glipizide.  - ISS.     # Hyperlipidemia  - Keep home medication with Atorvastatin 40mg daily.  - Counseled on healthy diet and regular exercise.      # Hypothyroidism  - Keep Levothyroxine 112mcg daily     This critically ill patient continues to be at-risk for clinically significant deterioration / failure due to the above mentioned dysfunctional, unstable organ systems.  I have personally identified and managed all complex critical care issues to prevent aforementioned clinical deterioration.  Critical care time is spent at bedside and/or the immediate area  and has included, but is not limited to, the review of diagnostic tests, labs, radiographs, serial assessments of hemodynamics, respiratory status, ventilatory management, and family updates.  Time spent in procedures and teaching are reported separately.    Critical care time: 65 minutes     Peripheral IV 09/14/24 20 G Right Antecubital (Active)   Site Assessment Clean;Dry;Intact 09/17/24 0600   Dressing Type Transparent 09/17/24 0600   Line Status Flushed 09/17/24 0600   Dressing Status Clean;Dry;Occlusive 09/17/24 0600   Number of days: 3       Code Status:  Full Code    Tyshawn Dixon MD  Cardiology

## 2024-09-17 NOTE — DISCHARGE SUMMARY
Discharge Diagnosis  Chest pain    Issues Requiring Follow-Up  PCP follow-up and cardiology follow-up    Discharge Meds     Medication List      CONTINUE taking these medications     ascorbic acid 1,000 mg tablet; Commonly known as: Vitamin C   aspirin 81 mg EC tablet   atorvastatin 40 mg tablet; Commonly known as: Lipitor; Take 1 tablet (40   mg) by mouth once daily at bedtime.   b complex 0.4 mg tablet   cinnamon 500 mg capsule   empagliflozin 25 mg; Commonly known as: Jardiance   Fish OiL 1,200 (144-216) mg capsule; Generic drug: omega 3-dha-epa-fish   oil   glipiZIDE 5 mg tablet; Commonly known as: Glucotrol   Janumet 50-1,000 mg tablet; Generic drug: SITagliptin phos-metformin   levothyroxine 112 mcg tablet; Commonly known as: Synthroid, Levoxyl   lisinopril 5 mg tablet   metoprolol succinate XL 25 mg 24 hr tablet; Commonly known as:   Toprol-XL; Take 1 tablet (25 mg) by mouth once daily. Do not crush or   chew. Do not fill before September 12, 2024.   multivitamin tablet   omeprazole 40 mg DR capsule; Commonly known as: PriLOSEC   Osteo Bi-Flex 250-200 mg tablet; Generic drug: glucosamine-chondroitin   tamsulosin 0.4 mg 24 hr capsule; Commonly known as: Flomax   testosterone cypionate 200 mg/mL injection; Commonly known as:   Depo-Testosterone   ticagrelor 90 mg tablet; Commonly known as: Brilinta; Take 1 tablet (90   mg) by mouth 2 times a day.   triamcinolone 0.1 % cream; Commonly known as: Kenalog       Test Results Pending At Discharge  Pending Labs       No current pending labs.            Hospital Course   74 years old male with a past medical history of coronary artery disease status post recent stenting, hypothyroidism, hyperlipidemia, hypertension presents with chest pain.  He was recently in the hospital and had LAD stent and was scheduled outpatients procedure for OM stenting.  But he has his ongoing chest pain and he was admitted to the ER.  During the course in the hospital patient had no chest  pain.  He was continued on his dual antiplatelets and cardiology was consulted.  Per  cardiology recommendation patient was taken for cardiac cath on Monday.  Patient had a double vessel coronary disease.  He had a patent stent to mid LAD.  Mid first OM was 90% calcified and proximal-mid ramus with 95% diffuse lesion.  He is status post accessible PCI to proximal-mid ramus and PCI to mid first OM.  Post stenting patient vitals remained stable.  He was observed in stepdown unit.  He was IV hydrated.  His kidney functions got a little worse during admission but then was stable and and trending down.  Patient was advised to follow-up with PCP and cardiology.  Patient advised to be on dual antiplatelets and metoprolol.  Patient discharged home in satisfactory condition    Pertinent Physical Exam At Time of Discharge  Physical Exam  Physical Exam  Constitutional:       Appearance: Normal appearance.   HENT:      Head: Normocephalic.      Nose: Nose normal.      Mouth/Throat:      Mouth: Mucous membranes are moist.   Eyes:      Pupils: Pupils are equal, round, and reactive to light.   Cardiovascular:      Rate and Rhythm: Normal rate.      Pulses: Normal pulses.   Pulmonary:      Effort: Pulmonary effort is normal.   Abdominal:      Palpations: Abdomen is soft.   Musculoskeletal:         General: Normal range of motion.      Cervical back: Normal range of motion.   Skin:     General: Skin is dry.      Capillary Refill: Capillary refill takes less than 2 seconds.   Neurological:      General: No focal deficit present.      Mental Status: He is alert.   Outpatient Follow-Up  Future Appointments   Date Time Provider Department Center   9/26/2024  1:30 PM Tyshawn Dixon MD [id number]         Rina Bolden MD

## 2024-09-17 NOTE — PROGRESS NOTES
Medication Education     Medication education for Gael Burnette was provided to the patient  for the following medication(s):  Home Medications      Medication education provided by a Pharmacist:  ADR Counseling Dose, frequency, storage How the medication works and benefits of taking it Benefits of taking the medication  Importance of compliance    Identified potential barriers to education:  None    Method(s) of Education:  Verbal Written materials provided and reviewed    An opportunity to ask questions and receive answers was provided.     Assessment of understanding the patient :  2= meets goals/outcomes    Additional Notes (if applicable): Talked to the patient about when to resume his home medications. Allowed patient opportunity to ask questions on home medications to see if there were any new questions since last admission.    Tra Arnold, PharmD

## 2024-09-17 NOTE — NURSING NOTE
Discharge Note: 9/17/2024 0932 AVS and pt responsibilities reviewed with pt and copy given. Heart cath, chest pain, education reviewed with pt and information sheets given. Pt verbalizes understanding of instructions received, verbalizes understanding of when to seek medical attention, denies any home going or personal care needs. Denies further questions or concerns. Reviewed follow up appts with pt and verbalizes understanding. Liliana MORILLO

## 2024-09-17 NOTE — CARE PLAN
The patient's goals for the shift include no chest pain    The clinical goals for the shift include cath site will not bleed through shift      Problem: Pain - Adult  Goal: Verbalizes/displays adequate comfort level or baseline comfort level  Outcome: Progressing     Problem: Safety - Adult  Goal: Free from fall injury  Outcome: Progressing     Problem: Discharge Planning  Goal: Discharge to home or other facility with appropriate resources  Outcome: Progressing     Problem: Chronic Conditions and Co-morbidities  Goal: Patient's chronic conditions and co-morbidity symptoms are monitored and maintained or improved  Outcome: Progressing     Problem: Pain  Goal: Takes deep breaths with improved pain control throughout the shift  Outcome: Progressing  Goal: Turns in bed with improved pain control throughout the shift  Outcome: Progressing  Goal: Walks with improved pain control throughout the shift  Outcome: Progressing  Goal: Performs ADL's with improved pain control throughout shift  Outcome: Progressing  Goal: Participates in PT with improved pain control throughout the shift  Outcome: Progressing  Goal: Free from opioid side effects throughout the shift  Outcome: Progressing  Goal: Free from acute confusion related to pain meds throughout the shift  Outcome: Progressing

## 2024-09-17 NOTE — PROGRESS NOTES
09/17/24 1250   Discharge Planning   Living Arrangements Spouse/significant other   Support Systems Spouse/significant other;Children   Assistance Needed None   Type of Residence Private residence   Number of Stairs to Enter Residence 6   Number of Stairs Within Residence 12   Do you have animals or pets at home? No   Who is requesting discharge planning? Provider   Home or Post Acute Services None   Expected Discharge Disposition Home   Does the patient need discharge transport arranged? No   Financial Resource Strain   How hard is it for you to pay for the very basics like food, housing, medical care, and heating? Not hard   Housing Stability   In the last 12 months, was there a time when you were not able to pay the mortgage or rent on time? N   In the past 12 months, how many times have you moved where you were living? 0   At any time in the past 12 months, were you homeless or living in a shelter (including now)? N   Transportation Needs   In the past 12 months, has lack of transportation kept you from medical appointments or from getting medications? no   In the past 12 months, has lack of transportation kept you from meetings, work, or from getting things needed for daily living? No     Care Transitions: Chart notes reviewed, patient medically ready for discharge today. Met with patient at bedside. Role of TCC explained. Demographics and contacts verified and updated in chart. PCP is Dr. Rivera @ [facility]. Preferred pharmacy is [facility] mail order for long term prescriptions and [facility] in [place] for immediate prescription needs. Denies any difficulty obtaining/affording medications. He is independent with all ADL's and drives self. He has a cane and walker in the home but does not currently use or need any equipment. [facility] 24/[facility]. Medicare IMM reviewed, patient signed, copy provided, original placed in chart. Voiced understanding. Discharge plan is home with wife picking him up. States  he has already scheduled an appt to start cardiac rehab. Denies any other needs or in home services. No needs anticipated. Care team available upon request. Belgica Paris RN/TCC

## 2024-09-17 NOTE — NURSING NOTE
Pt sitting up in bed eating breakfast, right wrist stabilized. He denies aches, pain, SOB, or dizziness. Pt is AMB.

## 2024-09-18 LAB
ATRIAL RATE: 65 BPM
P AXIS: 76 DEGREES
P OFFSET: 179 MS
P ONSET: 132 MS
PR INTERVAL: 190 MS
Q ONSET: 227 MS
QRS COUNT: 11 BEATS
QRS DURATION: 104 MS
QT INTERVAL: 402 MS
QTC CALCULATION(BAZETT): 418 MS
QTC FREDERICIA: 412 MS
R AXIS: 3 DEGREES
T AXIS: 59 DEGREES
T OFFSET: 428 MS
VENTRICULAR RATE: 65 BPM

## 2024-09-19 LAB
ATRIAL RATE: 78 BPM
P AXIS: 72 DEGREES
P OFFSET: 184 MS
P ONSET: 130 MS
PR INTERVAL: 194 MS
Q ONSET: 227 MS
QRS COUNT: 13 BEATS
QRS DURATION: 114 MS
QT INTERVAL: 382 MS
QTC CALCULATION(BAZETT): 435 MS
QTC FREDERICIA: 417 MS
R AXIS: 17 DEGREES
T AXIS: 55 DEGREES
T OFFSET: 418 MS
VENTRICULAR RATE: 78 BPM

## 2024-09-26 ENCOUNTER — APPOINTMENT (OUTPATIENT)
Dept: CARDIOLOGY | Facility: CLINIC | Age: 74
End: 2024-09-26
Payer: MEDICARE

## 2024-09-26 VITALS
SYSTOLIC BLOOD PRESSURE: 118 MMHG | OXYGEN SATURATION: 98 % | WEIGHT: 192.3 LBS | HEART RATE: 70 BPM | BODY MASS INDEX: 26.92 KG/M2 | HEIGHT: 71 IN | DIASTOLIC BLOOD PRESSURE: 60 MMHG

## 2024-09-26 DIAGNOSIS — I25.10 ATHEROSCLEROSIS OF NATIVE CORONARY ARTERY OF NATIVE HEART WITHOUT ANGINA PECTORIS: Primary | ICD-10-CM

## 2024-09-26 DIAGNOSIS — Z95.5 HX OF HEART ARTERY STENT: ICD-10-CM

## 2024-09-26 DIAGNOSIS — E78.5 HYPERLIPIDEMIA, UNSPECIFIED HYPERLIPIDEMIA TYPE: ICD-10-CM

## 2024-09-26 PROCEDURE — 1111F DSCHRG MED/CURRENT MED MERGE: CPT | Performed by: STUDENT IN AN ORGANIZED HEALTH CARE EDUCATION/TRAINING PROGRAM

## 2024-09-26 PROCEDURE — 99214 OFFICE O/P EST MOD 30 MIN: CPT | Performed by: STUDENT IN AN ORGANIZED HEALTH CARE EDUCATION/TRAINING PROGRAM

## 2024-09-26 PROCEDURE — 1160F RVW MEDS BY RX/DR IN RCRD: CPT | Performed by: STUDENT IN AN ORGANIZED HEALTH CARE EDUCATION/TRAINING PROGRAM

## 2024-09-26 PROCEDURE — 1159F MED LIST DOCD IN RCRD: CPT | Performed by: STUDENT IN AN ORGANIZED HEALTH CARE EDUCATION/TRAINING PROGRAM

## 2024-09-26 PROCEDURE — 3008F BODY MASS INDEX DOCD: CPT | Performed by: STUDENT IN AN ORGANIZED HEALTH CARE EDUCATION/TRAINING PROGRAM

## 2024-09-26 PROCEDURE — 1036F TOBACCO NON-USER: CPT | Performed by: STUDENT IN AN ORGANIZED HEALTH CARE EDUCATION/TRAINING PROGRAM

## 2024-09-26 NOTE — PROGRESS NOTES
No chief complaint on file.    HPI:  I was requested to evaluate this patient in consultation for cardiac assessment.    Mr. Gael Burnette is a 74 y.o. former smoker diabetic male with prior medical history significant for CAD S/P recent PCI to LAD (and residual lesion to OM), hypertension, diabetes mellitus, hyperlipidemia, hypothyroidism. He presented to ED [facility] on 09/14/2024 complaining of chest pain. Patient states that he had left sided chest pain last week and underwent successful PCI to LAD, with remaining lesion to OM that was scheduled for treatment as outpatient. However, due to new onset chest pain, we have opted to admit the patient and to schedule the PCI to the remaining lesion as inpatient. Initial troponin was 60. Labwork otherwise unremarkable. EKG showed sinus rhythm with incomplete RBBB and non-specific ST-T changes. He was given nitroglycerin with improvement in his pain. Patient was admitted for clinical compensation. Patient underwent successful PCI to LAD / Ramus / 1st OM. He was discharged uneventfully.    Past Medical History  Past Medical History:   Diagnosis Date    Coronary artery disease     Diabetes mellitus (Multi)     Disease of thyroid gland     Hyperlipidemia     Hypertension        Past Surgical History  Past Surgical History:   Procedure Laterality Date    CARDIAC CATHETERIZATION N/A 9/10/2024    Procedure: Left Heart Cath, With LV;  Surgeon: Tyshawn Dixon MD;  Location: [facility] Cardiac Cath Lab;  Service: Cardiovascular;  Laterality: N/A;    CARDIAC CATHETERIZATION N/A 9/10/2024    Procedure: PCI XOCHILT Stent- Coronary;  Surgeon: Tyshawn Dixon MD;  Location: [facility] Cardiac Cath Lab;  Service: Cardiovascular;  Laterality: N/A;    CARDIAC CATHETERIZATION N/A 9/10/2024    Procedure: IVUS - Coronary;  Surgeon: Tyshawn Dixon MD;  Location: [facility] Cardiac Cath Lab;  Service: Cardiovascular;  Laterality: N/A;    CARDIAC CATHETERIZATION N/A 9/16/2024     Procedure: PCI XOCHILT Stent- Coronary;  Surgeon: Tyshawn Dixon MD;  Location: [facility] Cardiac Cath Lab;  Service: Cardiovascular;  Laterality: N/A;       Past Family History  No family history on file.    Allergy History  No Known Allergies    Past Social History  Social History     Socioeconomic History    Marital status:    Tobacco Use    Smoking status: Former     Current packs/day: 0.00     Types: Cigarettes     Quit date:      Years since quittin.7    Smokeless tobacco: Never   Substance and Sexual Activity    Alcohol use: Never    Drug use: Never     Social Determinants of Health     Financial Resource Strain: Low Risk  (2024)    Overall Financial Resource Strain (CARDIA)     Difficulty of Paying Living Expenses: Not hard at all   Food Insecurity: No Food Insecurity (2023)    Received from [facility]    Hunger Vital Sign     Worried About Running Out of Food in the Last Year: Never true     Ran Out of Food in the Last Year: Never true   Transportation Needs: No Transportation Needs (2024)    PRAPARE - Transportation     Lack of Transportation (Medical): No     Lack of Transportation (Non-Medical): No   Physical Activity: Sufficiently Active (2023)    Received from [facility]    Exercise Vital Sign     Days of Exercise per Week: 3 days     Minutes of Exercise per Session: 60 min   Stress: No Stress Concern Present (2023)    Received from [facility]    [employer] [place] [employer] - Occupational Stress Questionnaire     Feeling of Stress : Not at all   Social Connections: Socially Integrated (2023)    Received from [facility]    Social Connection and Isolation Panel [NHANES]     Frequency of Communication with Friends and Family: Once a week     Frequency of Social Gatherings with Friends and Family: Twice a week     Attends Jehov[employer] witness Services: More than 4 times per year     Active Member of Clubs or Organizations: Yes      Attends Club or Organization Meetings: More than 4 times per year     Marital Status:    Housing Stability: Low Risk  (2024)    Housing Stability Vital Sign     Unable to Pay for Housing in the Last Year: No     Number of Times Moved in the Last Year: 0     Homeless in the Last Year: No       Social History     Tobacco Use   Smoking Status Former    Current packs/day: 0.00    Types: Cigarettes    Quit date:     Years since quittin.7   Smokeless Tobacco Never       Review of Systems:  Constitutional: Denies any fever or chills  Eyes: Denies any eye pain or blurry vision  ENT: Denies any ear pain or hearing loss  Cardiovascular: The heart rate is not slow, the heart rate is not fast  Respiratory: Denies any asthma/wheezing  Gastrointestinal: Denies any tasha colored stools or fatty food intolerance  Genitourinary: Denies any blood in the urine or pelvic pain  Musculoskeletal: Denies any swelling in the joints or difficulty walking  Skin: Denies any skin lumps or skin lesions  Neurological: Denies any dizziness/tingling      Objective Data:  Last Recorded Vitals:  There were no vitals filed for this visit.    Last Labs:  CBC - 2024:  5:30 AM  7.7 16.4 201    47.8      CMP - 2024:  5:30 AM  8.9 5.8 19 --- 0.6   _ 4.0 19 57      PTT - 2024: 11:06 AM  0.9   10.7 31     TROPHS   Date/Time Value Ref Range Status   09/15/2024 04:44 AM 59 0 - 20 ng/L Final     Comment:     Previous result verified on 2024 1806 on specimen/case [id number] called with component [employer] for procedure Troponin I, High Sensitivity, Initial with value 60 ng/L.   2024 06:43 PM 63 0 - 20 ng/L Final     Comment:     Previous result verified on 2024 1806 on specimen/case [id number] called with component TRPHS for procedure Troponin I, High Sensitivity, Initial with value 60 ng/L.   2024 05:27 PM 60 0 - 20 ng/L Final     BNP   Date/Time Value Ref Range Status   2024 11:07 AM 54 0  - 99 pg/mL Final     HGBA1C   Date/Time Value Ref Range Status   09/25/2024 01:28 PM 7.7 4.3 - 5.6 % Final     Comment:     American Diabetes Association guidelines indicate that patients with HgbA1c in the range 5.7-6.4% are at increased risk for development of diabetes, and intervention by lifestyle modification may be beneficial. HgbA1c greater or equal to 6.5% is considered diagnostic of diabetes.   06/18/2024 10:56 AM 7.4 4.3 - 5.6 % Final     Comment:     American Diabetes Association guidelines indicate that patients with HgbA1c in the range 5.7-6.4% are at increased risk for development of diabetes, and intervention by lifestyle modification may be beneficial. HgbA1c greater or equal to 6.5% is considered diagnostic of diabetes.     LDLCALC   Date/Time Value Ref Range Status   09/09/2024 02:50 PM 59 <=99 mg/dL Final     Comment:                                 Near   Borderline      AGE      Desirable  Optimal    High     High     Very High     0-19 Y     0 - 109     ---    110-129   >/= 130     ----    20-24 Y     0 - 119     ---    120-159   >/= 160     ----      >24 Y     0 -  99   100-129  130-159   160-189     >/=190       VLDL   Date/Time Value Ref Range Status   09/09/2024 02:50 PM 60 0 - 40 mg/dL Final        Patient Medications:  Outpatient Encounter Medications as of 9/26/2024   Medication Sig Dispense Refill    ascorbic acid (Vitamin C) 1,000 mg tablet Take 1 tablet (1,000 mg) by mouth once daily.      aspirin 81 mg EC tablet Take 1 tablet (81 mg) by mouth once daily.      atorvastatin (Lipitor) 40 mg tablet Take 1 tablet (40 mg) by mouth once daily at bedtime. 30 tablet 0    b complex 0.4 mg tablet Take 1 tablet by mouth once daily.      Cinnamon 500 mg capsule Take 2 capsules (1,000 mg) by mouth 2 times a day.      empagliflozin (Jardiance) 25 mg Take 1 tablet (25 mg) by mouth once daily.      glipiZIDE (Glucotrol) 5 mg tablet Take 1 tablet (5 mg) by mouth 2 times a day before meals.       glucosamine-chondroitin (Osteo Bi-Flex) 250-200 mg tablet Take 1 tablet by mouth once daily in the morning.      levothyroxine (Synthroid, Levoxyl) 112 mcg tablet Take 1 tablet (112 mcg) by mouth early in the morning.. Take on an empty stomach at the same time each day, either 30 to 60 minutes prior to breakfast      lisinopril 5 mg tablet Take 1 tablet (5 mg) by mouth once daily.      metoprolol succinate XL (Toprol-XL) 25 mg 24 hr tablet Take 1 tablet (25 mg) by mouth once daily. Do not crush or chew. Do not fill before September 12, 2024. 30 tablet 0    multivitamin tablet Take 1 tablet by mouth once daily.      omega 3-dha-epa-fish oil (Fish OiL) 1,200 (144-216) mg capsule Take 1 capsule (1,200 mg) by mouth once daily.      omeprazole (PriLOSEC) 40 mg DR capsule Take 1 capsule (40 mg) by mouth once daily in the morning. Take before meals. Do not crush or chew.      SITagliptin phos-metformin (Janumet) 50-1,000 mg tablet Take 1 tablet by mouth 2 times daily (morning and late afternoon).      tamsulosin (Flomax) 0.4 mg 24 hr capsule Take 1 capsule (0.4 mg) by mouth once daily.      testosterone cypionate (Depo-Testosterone) 200 mg/mL injection Inject 1 mL (200 mg) into the muscle every 14 (fourteen) days.      ticagrelor (Brilinta) 90 mg tablet Take 1 tablet (90 mg) by mouth 2 times a day. 60 tablet 0    triamcinolone (Kenalog) 0.1 % cream Apply topically once daily as needed for irritation.       No facility-administered encounter medications on file as of 9/26/2024.       Physical Exam:  General: alert, oriented and in no acute distress  HEENT: NC/AT; EOMI; PERRLA, external ear is normal  Neck: supple; trachea midline; no masses; no JVD  Chest: clear breath sounds bilaterally; no wheezing  Cardio: regular rhythm, S1S2 normal, no murmurs  Abdomen: Soft, non-tender, non-distension, no organomegaly  Extremities: no clubbing/cyanosis/edema. Good healing R radial access site  Neuro: Grossly intact     Psychiatric:  Normal mood and affect    Past Cardiology Results (Last 3 Years):  EKG:  Electrocardiogram 12 Lead 09/16/2024      ECG 12 lead 09/14/2024      ECG 12 Lead 09/10/2024      ECG 12 lead 09/09/2024 (Wet Read)      ECG 12 lead 09/09/2024    Echo:  Echo Results:  Transthoracic Echo (TTE) Complete 09/10/2024    [place] [address]  Phone [phone], Fax [phone]    TRANSTHORACIC ECHOCARDIOGRAM REPORT    Patient Name:      TROY Jacques Physician:    [id number] Ar Allen MD  Study Date:        9/10/2024             Ordering Provider:    [id number] SURI ONEILL  MRN/PID:           [id number]              Fellow:  Accession#:        [id number]          Nurse:  Date of Birth/Age: 1950 / 74 years   Sonographer:          Octaviano Galvez RDCS  Gender:            M                     Additional Staff:  Height:            180.34 cm             Admit Date:  Weight:            87.09 kg              Admission Status:     Inpatient -  Routine  BSA / BMI:         2.07 m2 / 26.78 kg/m2 Department Location:  [address]  Blood Pressure: 99 /63 mmHg    Study Type:    TRANSTHORACIC ECHO (TTE) COMPLETE  Diagnosis/ICD: Other chest pain-R07.89  CPT Codes:     Echo Complete w Full Doppler-68625  Study Detail: The following Echo studies were performed: 2D, M-Mode, color flow  and Doppler.      PHYSICIAN INTERPRETATION:  Left Ventricle: The left ventricular systolic function is normal, with a Basilio's biplane calculated ejection fraction of 60%. There are no regional wall motion abnormalities. The left ventricular cavity size is normal. Spectral Doppler shows a normal pattern of left ventricular diastolic filling.  Left Atrium: The left atrium is normal in size.  Right Ventricle: The right ventricle is normal in size. There is normal right ventricular global systolic function.  Right Atrium: The right atrium is normal in size.  Aortic Valve: The  aortic valve is trileaflet. The aortic valve dimensionless index is 0.95. There is no evidence of aortic valve regurgitation. The peak instantaneous gradient of the aortic valve is 6.5 mmHg. The mean gradient of the aortic valve is 4.0 mmHg.  Mitral Valve: The mitral valve is normal in structure. There is no evidence of mitral valve regurgitation.  Tricuspid Valve: The tricuspid valve is structurally normal. No evidence of tricuspid regurgitation.  Pulmonic Valve: The pulmonic valve is not well visualized. There is no indication of pulmonic valve regurgitation.  Pericardium: There is no pericardial effusion noted.  Aorta: The aortic root is normal.  Systemic Veins: The inferior vena cava appears to be of normal size, IVC inspiratory collapse greater than 50%.      CONCLUSIONS:  1. The left ventricular systolic function is normal, with a Basilio's biplane calculated ejection fraction of 60%.  2. There is normal right ventricular global systolic function.    QUANTITATIVE DATA SUMMARY:    2D MEASUREMENTS:           Normal Ranges:  Ao Root d:       3.20 cm   (2.0-3.7cm)  LAs:             3.20 cm   (2.7-4.0cm)  IVSd:            1.02 cm   (0.6-1.1cm)  LVPWd:           0.84 cm   (0.6-1.1cm)  LVIDd:           4.64 cm   (3.9-5.9cm)  LVIDs:           2.88 cm  LV Mass Index:   70.5 g/m2  LV % FS          37.9 %      LA VOLUME:                   Normal Ranges:  LA Vol A4C:        13.8 ml   (22+/-6mL/m2)  LA Vol A2C:        14.0 ml  LA Vol BP:         14.5 ml  LA Vol Index A4C:  6.7ml/m2  LA Vol Index A2C:  6.8 ml/m2  LA Vol Index BP:   7.0 ml/m2  LA Area A4C:       8.2 cm2  LA Area A2C:       7.9 cm2  LA Major Axis A4C: 4.2 cm  LA Major Axis A2C: 3.8 cm  LA Volume Index:   6.6 ml/m2  LA Vol A4C:        13.6 ml  LA Vol A2C:        14.0 ml  LA Vol Index BSA:  6.7 ml/m2      LV SYSTOLIC FUNCTION BY 2D PLANIMETRY (MOD):  Normal Ranges:  EF-A4C View:    55 % (>=55%)  EF-A2C View:    65 %  EF-Biplane:     60 %  LV EF Reported: 60  %      LV DIASTOLIC FUNCTION:           Normal Ranges:  MV Peak E:             0.56 m/s  (0.7-1.2 m/s)  MV Peak A:             0.74 m/s  (0.42-0.7 m/s)  E/A Ratio:             0.76      (1.0-2.2)  MV e'                  0.108 m/s (>8.0)  MV lateral e'          0.12 m/s  MV medial e'           0.09 m/s  E/e' Ratio:            5.23      (<8.0)      MITRAL VALVE:          Normal Ranges:  MV DT:        243 msec (150-240msec)      AORTIC VALVE:                     Normal Ranges:  AoV Vmax:                1.27 m/s (<=1.7m/s)  AoV Peak P.5 mmHg (<20mmHg)  AoV Mean P.0 mmHg (1.7-11.5mmHg)  LVOT Max Daniel:            1.17 m/s (<=1.1m/s)  AoV VTI:                 25.60 cm (18-25cm)  LVOT VTI:                24.20 cm  LVOT Diameter:           2.00 cm  (1.8-2.4cm)  AoV Area, VTI:           2.97 cm2 (2.5-5.5cm2)  AoV Area,Vmax:           2.89 cm2 (2.5-4.5cm2)  AoV Dimensionless Index: 0.95      RIGHT VENTRICLE:  RV Basal 4.34 cm  RV Mid   3.30 cm  RV Major 7.6 cm  TAPSE:   21.2 mm  RV s'    0.13 m/s      [id number] Ar Allen MD  Electronically signed on 9/10/2024 at 10:36:53 AM        ** Final **     Cath:  Cardiac Catheterization Procedure 2024      Cardiac Catheterization Procedure 09/10/2024    CV NCDR CATHPCI V5 COLLECTION FORM   Stress Test:  No results found for this or any previous visit from the past 1095 days.    Cardiac Imaging:  No results found for this or any previous visit from the past 1095 days.       Assessment/Plan     Mr. Gael Burnetet is a 74 y.o. former smoker diabetic male with prior medical history significant for CAD S/P recent PCI to LAD (and residual lesion to OM), hypertension, diabetes mellitus, hyperlipidemia, hypothyroidism. He presented to ED [facility] on 2024 complaining of chest pain. Patient states that he had left sided chest pain last week and underwent successful PCI to LAD, with remaining lesion to OM that was scheduled for treatment as  outpatient. However, due to new onset chest pain, we have opted to admit the patient and to schedule the PCI to the remaining lesion as inpatient. Initial troponin was 60. Labwork otherwise unremarkable. EKG showed sinus rhythm with incomplete RBBB and non-specific ST-T changes. He was given nitroglycerin with improvement in his pain. Patient was admitted for clinical compensation. Patient underwent successful PCI to LAD / Ramus / 1st OM. He was discharged uneventfully.     Assessment     # Unstable angina / CAD S/P PCI to LAD / Ramus and 1st OM  - Troponin 60.   - EKG showed  sinus rhythm with incomplete RBBB and non-specific ST-T changes.  - We had a thorough conversation with the patient about the natural history of atherosclerosis and the importance for commitment with healthy lifestyle, including but not limited to healthy diet, regular exercises, avoid sedentary and compliance to medication.   - Left Heart Catheterization (09/10/2024):  Double vessel Coronary Artery Disease  Mid LAD 95% calcified lesion - bifurcation with 1st Dg (70% ostial lesion) and 2nd Dg (50% ostial lesion)   Mid 1st OM 90% calcified lesion  RCA with irregularities  Preserved LV systolic function  S/P Successful PCI to mid LAD (bifurcation with 1st and 2nd Dg) using one XOCHILT, guided by IVUS  - Left Heart Catheterization (09/10/2024):  Patent stent to mid LAD  Mid 1st OM 90% calcified lesion  Prox-mid Ramus 95% diffuse lesion  S/P Successful PCI to prox-mid ramus using one XOCHILT, guided by IVUS  S/P Successful PCI to mid 1st OM (bifurcation lesion) using one XOCHILT, guided by IVUS  - Keep ASA; Ticagrelor; Atorvastatin 40mg daily; Metoprolol succinate 25mg daily; Lisinopril 5mg daily.  - Keep Metoprolol succinate 25mg daily.  - Cardiac rehab.  - Follow up in 6 months.     # Hypertension  - Controlled blood pressure.  - Keep current medications including lisinopril 5mg daily.  - Patient counseled to keep a healthy lifestyle including regular  exercise and low-sodium diet.  - Recommended home blood pressure monitoring.  - Goal of BP < 130/80mmHg.     # Diabetes  - Followed by PCP.  - Counseled on healthy diet and regular exercises.  - Discussed need for weight loss and the benefits.   - Keep current medications including empaglifozin, glipizide.     # Hyperlipidemia  - Followed by PCP.   - Keep home medication with Atorvastatin 40mg daily.  - Counseled on healthy diet and regular exercise.      # Hypothyroidism  - Keep Levothyroxine 112mcg daily     We have discussed the most common side effects of the prescribed medications, indications, drug interactions, risks, complications, and alternatives of medications/therapeutics were explained and discussed. The patient has been requested to monitor closely for any untoward side effects or complications of medications. The patient has been strongly advised to be compliant with the recommendations, all the questions and concerns have been addressed. The patient has been also instructed to call, to return sooner or to go to the emergency department if symptoms persist or get worsen. The patient voiced understanding and denies any further questions at this time.      This note was transcribed using the Dragon Dictation system. There may be grammatical, punctuation, or verbiage errors that occur with voice recognition programs.    Counseling greater than 50% of visit regarding all cardiac issues.    Thank you for allowing me to participate in the care of this patient. Please do not hesitate to contact me with any further questions or concerns.    Tyshawn Dixon MD  Cardiology

## 2024-10-07 ENCOUNTER — APPOINTMENT (OUTPATIENT)
Dept: CARDIOLOGY | Facility: CLINIC | Age: 74
End: 2024-10-07
Payer: MEDICARE

## 2024-10-16 ENCOUNTER — CLINICAL SUPPORT (OUTPATIENT)
Dept: CARDIAC REHAB | Facility: HOSPITAL | Age: 74
End: 2024-10-16
Payer: MEDICARE

## 2024-10-16 VITALS
HEIGHT: 71 IN | OXYGEN SATURATION: 96 % | BODY MASS INDEX: 27.66 KG/M2 | SYSTOLIC BLOOD PRESSURE: 97 MMHG | DIASTOLIC BLOOD PRESSURE: 61 MMHG | WEIGHT: 197.6 LBS

## 2024-10-16 ASSESSMENT — PATIENT HEALTH QUESTIONNAIRE - PHQ9
5. POOR APPETITE OR OVEREATING: SEVERAL DAYS
1. LITTLE INTEREST OR PLEASURE IN DOING THINGS: NOT AT ALL
8. MOVING OR SPEAKING SO SLOWLY THAT OTHER PEOPLE COULD HAVE NOTICED. OR THE OPPOSITE, BEING SO FIGETY OR RESTLESS THAT YOU HAVE BEEN MOVING AROUND A LOT MORE THAN USUAL: NOT AT ALL
SUM OF ALL RESPONSES TO PHQ QUESTIONS 1-9: 1
7. TROUBLE CONCENTRATING ON THINGS, SUCH AS READING THE NEWSPAPER OR WATCHING TELEVISION: NOT AT ALL
SUM OF ALL RESPONSES TO PHQ9 QUESTIONS 1 & 2: 0
SUM OF ALL RESPONSES TO PHQ QUESTIONS 1-9: 1
4. FEELING TIRED OR HAVING LITTLE ENERGY: NOT AT ALL
6. FEELING BAD ABOUT YOURSELF - OR THAT YOU ARE A FAILURE OR HAVE LET YOURSELF OR YOUR FAMILY DOWN: NOT AT ALL
2. FEELING DOWN, DEPRESSED OR HOPELESS: NOT AT ALL
3. TROUBLE FALLING OR STAYING ASLEEP OR SLEEPING TOO MUCH: NOT AT ALL
9. THOUGHTS THAT YOU WOULD BE BETTER OFF DEAD, OR OF HURTING YOURSELF: NOT AT ALL

## 2024-10-16 ASSESSMENT — DUKE ACTIVITY SCORE INDEX (DASI)
CAN YOU HAVE SEXUAL RELATIONS: YES
CAN YOU RUN A SHORT DISTANCE: YES
CAN YOU TAKE CARE OF YOURSELF (EAT, DRESS, BATHE, OR USE TOILET): YES
CAN YOU WALK INDOORS, SUCH AS AROUND YOUR HOUSE: YES
CAN YOU CLIMB A FLIGHT OF STAIRS OR WALK UP A HILL: YES
DASI METS SCORE: 8
CAN YOU DO MODERATE WORK AROUND THE HOUSE LIKE VACUUMING, SWEEPING FLOORS OR CARRYING GROCERIES: YES
CAN YOU DO YARD WORK LIKE RAKING LEAVES, WEEDING OR PUSHING A MOWER: YES
TOTAL_SCORE: 42.7
CAN YOU WALK A BLOCK OR TWO ON LEVEL GROUND: YES
CAN YOU PARTICIPATE IN MODERATE RECREATIONAL ACTIVITIES LIKE GOLF, BOWLING, DANCING, DOUBLES TENNIS OR THROWING A BASEBALL OR FOOTBALL: YES
CAN YOU DO LIGHT WORK AROUND THE HOUSE LIKE DUSTING OR WASHING DISHES: YES
CAN YOU DO HEAVY WORK AROUND THE HOUSE LIKE SCRUBBING FLOORS OR LIFTING AND MOVING HEAVY FURNITURE: NO
CAN YOU PARTICIPATE IN STRENOUS SPORTS LIKE SWIMMING, SINGLES TENNIS, FOOTBALL, BASKETBALL, OR SKIING: NO

## 2024-10-16 NOTE — PROGRESS NOTES
"  Cardiac Rehabilitation Initial Treatment Plan    Name: Gael Burnette  Medical Record Number: [id number]  YOB: 1950  Age: 74 y.o.    Today’s Date: 10/16/2024  Primary Care Physician: Kirill Rivera MD  Referring Physician: Gael GAN (Alise) Dr. Tyshawn Dixon  Program Location: [address]  Primary Diagnosis: Coronary Artery Disease involving native coronary artery of native heart, unspecified whether angina present   Onset/Date of Diagnosis: 09/10/2024    Initial Assessment, not yet started program.    AACVPR Risk Stratification: Low     Falls Risk: Medium  Psychosocial Assessment     Pre PHQ-9: 1      Sent PH-Q 9 to MD if score > 20: No; score < 20    Pt reported/currently experiencing stress: No  Patient uses stress management skills: Yes   History of: no history of anxiety or depression  Currently seeing a mental health provider: No  Social Support: Yes, Whom:Wife and Children  Quality of Life Survey: SF-36   SF-36 Pre Post   Physical Component Score 44.47 TBD   Mental Component Score 62.66 TBD     Learning Assessment:  Learning assessment/barriers: None  Preferred learning method: Auditory, Visual, Reading handout, and Writing handout  Barriers: None  Comments:    Stages of Change:Preparation    Psychosocial Plan    Goal Status: Initial Assessment; goals not yet started    Psychosocial Goals:   Maintain or decrease PHQ-9 score by decreasing the amount of days the patient has a poor appetite or overeats.  (Patient scored in \"none-minimal\" category, score of 1).  Learn and utilize stress management skills and apply them to reported stressors while in the program.  Question on new or current psychological issues at least every 30 days.  Improve PCS and MCS scores by at least 5 points by discharge.  Educate patient on ways to reduce stress and the importance of stress management in regards to cardiac health.  Provide 1:1 emotional support as " "needed and facilitate peer support within the context of the other phase 2 patients.     Psychosocial Interventions/Education:   Encourage patient to complete all emotional health and stress reduction activities and worksheets provided throughout the program.     Initial Assessment:      Nutrition Assessment:    Hyperlipidemia: Yes     Lipids:   Lab Results   Component Value Date    CHOL 149 09/09/2024    HDL 30.0 09/09/2024    TRIG 301 (H) 09/09/2024       Current Dietary Guidelines:   Barriers to dietary change: no    Diet Habit Survey: Picture Your Plate  Pre: 50  Post: To be done at discharge.    Diabetes Assessment    Lab Results   Component Value Date    HGBA1C 7.7 (H) 09/25/2024       History of Diabetes: Yes Pt monitors BS at home: Yes   Frequency: 1-2 /day  FBS range: 140 - 170  Hypoglycemic Episodes: No     Weight Management    Height: 180.3 cm (5' 11\")  Weight: 89.6 kg (197 lb 9.6 oz)  BMI (Calculated): 27.57    Nutrition Plan    Goal Status: Initial Assessment; goals not yet started    Nutrition Goals:   Increase PYP score to greater than 51 by discharge. (On admission, patient scored 50 which is in category, \" A dietary pattern that is also unlikely to meet current recommendations for good health and has room for some improvement.\"  Provide education on nutritional aspects related to cardiac health and discuss dietary improvements while in the program.   Lose 1/2 inch from waist by discharge. ( 43 \" On admission).  Gain knowledge on lipids and have a better understanding of lab results during the program.   Discuss PYP scores within the first 12 sessions of the program.  Send referral to diabetic educator by end of program.       Nutrition Interventions/Education:   Informed patient that nutritional topics will be discussed including following of the Mediterranean Diet. Patient verbalized understanding. 10/16/2024  2.   Explained to patient that education will be completed with book and workbook and " "expected for completion. Discussed with patient that there will be several different topics over the 12 weeks. Patient acknowledged understanding. 10/16/2024    Exercise Assessment    Yes  Mode: Aerobic & Strength Training  Frequency: 3-4 days/ week  Duration: 30 minutes    Exercise Prescription     Exercise Prescription based on: 6 Minute Walk Test    6MWT: Yes  Pre Test O2 Sat/HR: 96/75  6 Minute O2 Sat/HR: 98/92  Distance Walked(ft): 1720  Neivn Dyspnea: 0  Nevin PRE: 0  Post Test O2 Sat/HR: 96/70  Adverse Reactions : none     Frequency:  3 days/week   Mode: Treadmill, NuStep, and Recumbent Cycle   Duration: >30 total aerobic minutes   Intensity: RPE <15  Target HR:   REST+30  MET Level: 3.5  Patient wears supplemental O2: No     Modality Workload METs Duration (minutes)   1 Pre-Exercise      2 Warm Up       3 Recumbent Bike 26 Esparza @2  3.5 12 :00   4 NuStep 43 Esparza @2 3.5 12 :00   5 Treadmill 3.2 mph  3.5 12 :00   6 Post-Exercise        Resistance Training: No to be started at session #9  Home Exercise Prescription given: To be given prior to discharge from program.    Exercise Plan    Goal Status: Initial Assessment; goals not yet started    Exercise Goals:   Increase mets to 6.0 by the end of the program.  Gain independence and confidence with exercise while in the program.  Have a plan to continue exercise after he completes the program.  Increase mets by 1.0 every 6 weeks as tolerated.    Exercise Interventions/Education:   \"What are exactly mets\" handout provided and discussed. Patient understood well. 10/16/2024  NEVIN handout provided and discussed. Patient verbalized understanding. 10/16/2024    Initial Assessment:      Other Core Components/Risk Factor Assessment:    Medication adherence  Current Medications:   Medication Documentation Review Audit       Reviewed by Sis Mcgee RN (Registered Nurse) on 10/16/24 at 1511      Medication Order Taking? Sig Documenting Provider Last Dose Status   ascorbic " acid (Vitamin C) 1,000 mg tablet [id number] No Take 1 tablet (1,000 mg) by mouth once daily. Elliott Flores MD Taking Active   aspirin 81 mg EC tablet [id number] No Take 1 tablet (81 mg) by mouth once daily. Elliott Flores MD Taking Active   atorvastatin (Lipitor) 40 mg tablet [id number] No Take 1 tablet (40 mg) by mouth once daily at bedtime. MALIK Oakes Taking Active   b complex 0.4 mg tablet [id number] No Take 1 tablet by mouth once daily. Elliott Flores MD Taking Active   Cinnamon 500 mg capsule [id number] No Take 2 capsules (1,000 mg) by mouth 2 times a day. Elliott Flores MD Taking Active   empagliflozin (Jardiance) 25 mg [id number] No Take 1 tablet (25 mg) by mouth once daily. Elliott Flores MD Taking Active   glipiZIDE (Glucotrol) 5 mg tablet [id number] No Take 1 tablet (5 mg) by mouth 2 times a day before meals. Elliott Flores MD Taking Active   glucosamine-chondroitin (Osteo Bi-Flex) 250-200 mg tablet [id number] No Take 1 tablet by mouth once daily in the morning. Elliott Flores MD Taking Active   levothyroxine (Synthroid, Levoxyl) 112 mcg tablet [id number] No Take 1 tablet (112 mcg) by mouth early in the morning.. Take on an empty stomach at the same time each day, either 30 to 60 minutes prior to breakfast lEliott Flores MD Taking Active   lisinopril 5 mg tablet [id number] No Take 1 tablet (5 mg) by mouth once daily. Elliott Flores MD Taking Active   metoprolol succinate XL (Toprol-XL) 25 mg 24 hr tablet [id number] No Take 1 tablet (25 mg) by mouth once daily. Do not crush or chew. Do not fill before 2024. MALIK Oakes Taking  10/12/24 2359   multivitamin tablet [id number] No Take 1 tablet by mouth once daily. Elliott Flores MD Taking Active   omega 3-dha-epa-fish oil (Fish OiL) 1,200 (144-216) mg capsule [id number] No Take 1 capsule (1,200 mg) by mouth once daily. Elliott Flores MD Taking  Active   omeprazole (PriLOSEC) 40 mg DR capsule [id number] No Take 1 capsule (40 mg) by mouth once daily in the morning. Take before meals. Do not crush or chew. Historical Provider, MD Taking Active   SITagliptin phos-metformin (Janumet) 50-1,000 mg tablet [id number] No Take 1 tablet by mouth 2 times daily (morning and late afternoon). Elliott Provider, MD Taking Active   tamsulosin (Flomax) 0.4 mg 24 hr capsule [id number] No Take 1 capsule (0.4 mg) by mouth once daily. Historical Provider, MD Taking Active   testosterone cypionate (Depo-Testosterone) 200 mg/mL injection [id number] No Inject 1 mL (200 mg) into the muscle every 14 (fourteen) days. Elliott Provider, MD Taking Active   ticagrelor (Brilinta) 90 mg tablet [id number] No Take 1 tablet (90 mg) by mouth 2 times a day. Rach Francisco, LAWRENCE-CNP Taking  10/11/24 2359   triamcinolone (Kenalog) 0.1 % cream [id number] No Apply topically once daily as needed for irritation. Historical Provider, MD Taking Active                                 Medication compliance: Yes   Uses pill box/organizer: Yes    Carries medication list: Yes     Blood Pressure Management  History of Hypertension: Yes   Medication Changes: No   Resting BP:  Visit Vitals  BP 97/61        Heart Failure Management  Hx of Heart Failure: No    Smoking/Tobacco Assessment  Social History     Tobacco Use   Smoking Status Former    Current packs/day: 0.00    Types: Cigarettes    Quit date: 1981    Years since quittin.8   Smokeless Tobacco Never       Other Core Component Plan    Goal Status: Initial Assessment; goals not yet started    Other Core Component Goals:   Maintain a resting BP of <130/80 while in the program  Increase knowledge test score from 11/15 to 15/15 by discharge.  Monitor blood pressure pre, during and post workout.  Meet and discuss knowledge test within the first 12 sessions.     Other Core Component Interventions/Education:   Educate the patient on the  "importance of carrying and updating medication list.   2.   Handout given, \"Tips on taking medication.\"   3.   Handouts given, \"Benefits of Cardiac Rehab\" and American Heart Association's, \"What is Cardiac Rehab?\" Patient acknowledged understanding. 10/16/2024      Initial Assessment:      Individual Patient Goals:    Stay healthy  Maintain or improve energy levels    Goal Status: Initial Assessment; goals not yet started    Staff Comments:  Patient oriented to 3:30 class time.  Patient completed all entry paperwork. All entry paperwork will be reviewed with patient over the course of rehab. The patient was able to complete 6 minute walk with no shortness of breath. The patient walked 1720 feet at a pace of 3.2 mph and obtained 3.5 mets. Patient tolerated well. Patient will being rehab on Monday, October 21, 2024.        Rehab Staff Signature: Sis Mcgee RN        "

## 2024-10-21 ENCOUNTER — CLINICAL SUPPORT (OUTPATIENT)
Dept: CARDIAC REHAB | Facility: HOSPITAL | Age: 74
End: 2024-10-21
Payer: MEDICARE

## 2024-10-21 DIAGNOSIS — I25.10 CORONARY ARTERY DISEASE INVOLVING NATIVE CORONARY ARTERY OF NATIVE HEART, UNSPECIFIED WHETHER ANGINA PRESENT: Primary | ICD-10-CM

## 2024-10-21 PROCEDURE — 94618 PULMONARY STRESS TESTING: CPT | Performed by: STUDENT IN AN ORGANIZED HEALTH CARE EDUCATION/TRAINING PROGRAM

## 2024-10-21 PROCEDURE — 93798 PHYS/QHP OP CAR RHAB W/ECG: CPT | Performed by: STUDENT IN AN ORGANIZED HEALTH CARE EDUCATION/TRAINING PROGRAM

## 2024-10-23 ENCOUNTER — CLINICAL SUPPORT (OUTPATIENT)
Dept: CARDIAC REHAB | Facility: HOSPITAL | Age: 74
End: 2024-10-23
Payer: MEDICARE

## 2024-10-23 DIAGNOSIS — I25.10 CORONARY ARTERY DISEASE INVOLVING NATIVE CORONARY ARTERY OF NATIVE HEART, UNSPECIFIED WHETHER ANGINA PRESENT: Primary | ICD-10-CM

## 2024-10-23 PROCEDURE — 93798 PHYS/QHP OP CAR RHAB W/ECG: CPT | Performed by: STUDENT IN AN ORGANIZED HEALTH CARE EDUCATION/TRAINING PROGRAM

## 2024-10-25 ENCOUNTER — CLINICAL SUPPORT (OUTPATIENT)
Dept: CARDIAC REHAB | Facility: HOSPITAL | Age: 74
End: 2024-10-25
Payer: MEDICARE

## 2024-10-25 DIAGNOSIS — I25.10 CORONARY ARTERY DISEASE INVOLVING NATIVE CORONARY ARTERY OF NATIVE HEART WITHOUT ANGINA PECTORIS: Primary | ICD-10-CM

## 2024-10-25 PROCEDURE — 93798 PHYS/QHP OP CAR RHAB W/ECG: CPT | Performed by: STUDENT IN AN ORGANIZED HEALTH CARE EDUCATION/TRAINING PROGRAM

## 2024-10-28 ENCOUNTER — CLINICAL SUPPORT (OUTPATIENT)
Dept: CARDIAC REHAB | Facility: HOSPITAL | Age: 74
End: 2024-10-28
Payer: MEDICARE

## 2024-10-28 DIAGNOSIS — I25.10 CORONARY ARTERY DISEASE INVOLVING NATIVE CORONARY ARTERY OF NATIVE HEART, UNSPECIFIED WHETHER ANGINA PRESENT: Primary | ICD-10-CM

## 2024-10-28 PROCEDURE — 93798 PHYS/QHP OP CAR RHAB W/ECG: CPT | Performed by: STUDENT IN AN ORGANIZED HEALTH CARE EDUCATION/TRAINING PROGRAM

## 2024-10-30 ENCOUNTER — CLINICAL SUPPORT (OUTPATIENT)
Dept: CARDIAC REHAB | Facility: HOSPITAL | Age: 74
End: 2024-10-30
Payer: MEDICARE

## 2024-10-30 PROCEDURE — 93798 PHYS/QHP OP CAR RHAB W/ECG: CPT

## 2024-11-01 ENCOUNTER — CLINICAL SUPPORT (OUTPATIENT)
Dept: CARDIAC REHAB | Facility: HOSPITAL | Age: 74
End: 2024-11-01
Payer: MEDICARE

## 2024-11-01 DIAGNOSIS — I25.10 CORONARY ARTERY DISEASE INVOLVING NATIVE CORONARY ARTERY OF NATIVE HEART, UNSPECIFIED WHETHER ANGINA PRESENT: Primary | ICD-10-CM

## 2024-11-01 PROCEDURE — 93798 PHYS/QHP OP CAR RHAB W/ECG: CPT | Performed by: STUDENT IN AN ORGANIZED HEALTH CARE EDUCATION/TRAINING PROGRAM

## 2024-11-04 ENCOUNTER — CLINICAL SUPPORT (OUTPATIENT)
Dept: CARDIAC REHAB | Facility: HOSPITAL | Age: 74
End: 2024-11-04
Payer: MEDICARE

## 2024-11-04 DIAGNOSIS — I25.10 CORONARY ARTERY DISEASE INVOLVING NATIVE CORONARY ARTERY OF NATIVE HEART, UNSPECIFIED WHETHER ANGINA PRESENT: Primary | ICD-10-CM

## 2024-11-04 PROCEDURE — 93798 PHYS/QHP OP CAR RHAB W/ECG: CPT | Performed by: STUDENT IN AN ORGANIZED HEALTH CARE EDUCATION/TRAINING PROGRAM

## 2024-11-06 ENCOUNTER — CLINICAL SUPPORT (OUTPATIENT)
Dept: CARDIAC REHAB | Facility: HOSPITAL | Age: 74
End: 2024-11-06
Payer: MEDICARE

## 2024-11-06 DIAGNOSIS — I25.10 CORONARY ARTERY DISEASE INVOLVING NATIVE CORONARY ARTERY OF NATIVE HEART, UNSPECIFIED WHETHER ANGINA PRESENT: Primary | ICD-10-CM

## 2024-11-06 PROCEDURE — 93798 PHYS/QHP OP CAR RHAB W/ECG: CPT | Performed by: STUDENT IN AN ORGANIZED HEALTH CARE EDUCATION/TRAINING PROGRAM

## 2024-11-08 ENCOUNTER — CLINICAL SUPPORT (OUTPATIENT)
Dept: CARDIAC REHAB | Facility: HOSPITAL | Age: 74
End: 2024-11-08
Payer: MEDICARE

## 2024-11-08 DIAGNOSIS — I25.10 CORONARY ARTERY DISEASE INVOLVING NATIVE CORONARY ARTERY OF NATIVE HEART, UNSPECIFIED WHETHER ANGINA PRESENT: Primary | ICD-10-CM

## 2024-11-08 PROCEDURE — 93798 PHYS/QHP OP CAR RHAB W/ECG: CPT | Performed by: STUDENT IN AN ORGANIZED HEALTH CARE EDUCATION/TRAINING PROGRAM

## 2024-11-11 ENCOUNTER — CLINICAL SUPPORT (OUTPATIENT)
Dept: CARDIAC REHAB | Facility: HOSPITAL | Age: 74
End: 2024-11-11
Payer: MEDICARE

## 2024-11-11 DIAGNOSIS — I25.10 CORONARY ARTERY DISEASE INVOLVING NATIVE CORONARY ARTERY OF NATIVE HEART, UNSPECIFIED WHETHER ANGINA PRESENT: Primary | ICD-10-CM

## 2024-11-11 PROCEDURE — 93798 PHYS/QHP OP CAR RHAB W/ECG: CPT | Performed by: STUDENT IN AN ORGANIZED HEALTH CARE EDUCATION/TRAINING PROGRAM

## 2024-11-12 NOTE — PROGRESS NOTES
"  Cardiac Rehabilitation 30 Day Reassessment    Name: Gael Burnette  Medical Record Number: [id number]  YOB: 1950  Age: 74 y.o.    Today’s Date: 11/12/2024  Primary Care Physician: Kirill Rivera MD  Referring Physician: Gael GAN (Alise) Dr. Tyshawn Dixon  Program Location: [address]     Sessions Completed:  10  Exercise Start Date: 10/21/2024    General  Primary Diagnosis: Coronary Artery Disease involving native coronary artery of native heart, unspecified whether angina present   Onset/Date of Diagnosis: 09/10/2024    AACVPR Risk Stratification: Low    Falls Risk: Medium  Psychosocial Assessment     Pre PHQ-9: 1      Sent PH-Q 9 to MD if score > 20: No; score < 20    Pt reported/currently experiencing stress: No  Patient uses stress management skills: Yes   History of: no history of anxiety or depression  Currently seeing a mental health provider: No  Social Support: Yes, Whom:Wife and Children  Quality of Life Survey: SF-36   SF-36 Pre Post   Physical Component Score 44.47 TBD   Mental Component Score 62.66 TBD     Learning Assessment:  Learning assessment/barriers: None  Preferred learning method: Auditory, Visual, Reading handout, and Writing handout  Barriers: None  Comments:    Stages of Change:Action    Psychosocial Plan    Goal Status: In progress    Psychosocial Goals:   Maintain or decrease PHQ-9 score by decreasing the amount of days the patient has a poor appetite or overeats.  (Patient scored in \"none-minimal\" category, score of 1). (In progress)  Learn and utilize stress management skills and apply them to reported stressors while in the program. (In progress)  Question on new or current psychological issues at least every 30 days. (In progress)  Improve PCS and MCS scores by at least 5 points by discharge. (In progress)  Educate patient on ways to reduce stress and the importance of stress management in regards to cardiac health. (In " "progress)  Provide 1:1 emotional support as needed and facilitate peer support within the context of the other phase 2 patients. (In progress)     Psychosocial Interventions/Education:   Encourage patient to complete all emotional health and stress reduction activities and worksheets provided throughout the program. (In progress)  Patient rates stress at a 0 out of 10 at this time. 11/11/2024      Nutrition Assessment:    Hyperlipidemia: Yes     Lipids:   Lab Results   Component Value Date    CHOL 149 09/09/2024    HDL 30.0 09/09/2024    TRIG 301 (H) 09/09/2024       Current Dietary Guidelines:   Barriers to dietary change: no    Diet Habit Survey: Picture Your Plate  Pre: 50  Post: To be done at discharge.    Diabetes Assessment    Lab Results   Component Value Date    HGBA1C 7.7 (H) 09/25/2024       History of Diabetes: Yes    Pt monitors BS at home: Yes   Frequency: 1-2 /day  FBS range: 140 - 170  Hypoglycemic Episodes: No     Weight Management  Height: 180.3 cm (5' 11\")  Weight: 89.6 kg (197 lb 9.6 oz)  BMI (Calculated): 27.57    Nutrition Plan    Goal Status: In progress    Nutrition Goals:   Increase PYP score to greater than 51 by discharge. (On admission, patient scored 50 which is in category, \" A dietary pattern that is also unlikely to meet current recommendations for good health and has room for some improvement.\" (In progress)  Provide education on nutritional aspects related to cardiac health and discuss dietary improvements while in the program. (In progress)  Lose 1/2 inch from waist by discharge. ( 43 \" On admission). (In progress)   Gain knowledge on lipids and have a better understanding of lab results during the program. (In progress)   Discuss PYP scores within the first 12 sessions of the program. (In progress)  Send referral to diabetic educator by end of program. (Met, 11/12/2024)      Nutrition Interventions/Education:   Informed patient that nutritional topics will be discussed including " "following of the Mediterranean Diet. Patient verbalized understanding. 10/16/2024  Explained to patient that education will be completed with book and workbook and expected for completion. Discussed with patient that there will be several different topics over the 12 weeks. Patient acknowledged understanding. 10/16/2024  Referral to diabetic educator sent on 11/12/2024.     Exercise Assessment    Yes  Mode: Aerobic & Strength Training, Cardio  Frequency: 2 days/ week  Duration: 60 minutes    Exercise Prescription     Exercise Prescription based on: 6 Minute Walk Test    6MWT: Yes  Pre Test O2 Sat/HR: 96/75  6 Minute O2 Sat/HR: 98/92  Distance Walked(ft): 1720  Nevin Dyspnea: 0  Nevin PRE: 0  Post Test O2 Sat/HR: 96/70  Adverse Reactions : none      Frequency:  3 days/week   Mode: Treadmill, NuStep, and Recumbent Cycle   Duration: >30 total aerobic minutes   Intensity: RPE <15  Target HR:   REST+30  MET Level: 4.4  Patient wears supplemental O2: No     Modality Workload METs Duration (minutes)   1 Pre-Exercise      2 Warm Up       3 Recumbent Bike 42 Esparza @2  4.1 12 :00   4 NuStep 72Watts @2 4.4 12 :00   5 Treadmill 2 mph  2.6 12 :00   6 Weights      7 Post-Exercise        Resistance Training: Yes  Home Exercise Prescription given: To be given prior to discharge from program.    Exercise Plan    Goal Status: In progress    Exercise Goals:   Increase mets to 6.0 by the end of the program.(In progress)  Gain independence and confidence with exercise while in the program. (In progress)  Have a plan to continue exercise after he completes the program. (In progress)  Increase mets by 1.0 every 6 weeks as tolerated. (In progress)     Exercise Interventions/Education:   \"What are exactly mets\" handout provided and discussed. Patient understood well. 10/16/2024  NEVIN handout provided and discussed. Patient verbalized understanding. 10/16/2024  Patient reports exercising on off days by completing arm and leg conditioning " exercises and usually 20 minutes of cardio. 11/11/2024  Patient currently exercising at 4.1 mets on the bicycle, 4.4 mets on the nustep, and 2.6 mets on the treadmill. Patient tolerating exercise well. 11/11/2024.      Other Core Components/Risk Factor Assessment:    Medication adherence  Current Medications:   Medication Documentation Review Audit       Reviewed by Sis Mcgee RN (Registered Nurse) on 10/16/24 at 1511      Medication Order Taking? Sig Documenting Provider Last Dose Status   ascorbic acid (Vitamin C) 1,000 mg tablet [id number] No Take 1 tablet (1,000 mg) by mouth once daily. Historical MD Mark Taking Active   aspirin 81 mg EC tablet [id number] No Take 1 tablet (81 mg) by mouth once daily. Elliott Flores MD Taking Active   atorvastatin (Lipitor) 40 mg tablet [id number] No Take 1 tablet (40 mg) by mouth once daily at bedtime. Rach Francisco, APRN-CNP Taking Active   b complex 0.4 mg tablet [id number] No Take 1 tablet by mouth once daily. Historical MD Mark Taking Active   Cinnamon 500 mg capsule [id number] No Take 2 capsules (1,000 mg) by mouth 2 times a day. Historical MD Mark Taking Active   empagliflozin (Jardiance) 25 mg [id number] No Take 1 tablet (25 mg) by mouth once daily. Historical MD Mark Taking Active   glipiZIDE (Glucotrol) 5 mg tablet [id number] No Take 1 tablet (5 mg) by mouth 2 times a day before meals. Elliott Flores MD Taking Active   glucosamine-chondroitin (Osteo Bi-Flex) 250-200 mg tablet [id number] No Take 1 tablet by mouth once daily in the morning. Elliott Flores MD Taking Active   levothyroxine (Synthroid, Levoxyl) 112 mcg tablet [id number] No Take 1 tablet (112 mcg) by mouth early in the morning.. Take on an empty stomach at the same time each day, either 30 to 60 minutes prior to breakfast Elliott Flores MD Taking Active   lisinopril 5 mg tablet [id number] No Take 1 tablet (5 mg) by mouth once daily. Elliott Flores MD Taking  Active   metoprolol succinate XL (Toprol-XL) 25 mg 24 hr tablet [id number] No Take 1 tablet (25 mg) by mouth once daily. Do not crush or chew. Do not fill before 2024. MALIK Oakes Taking  10/12/24 2359   multivitamin tablet [id number] No Take 1 tablet by mouth once daily. Historical Provider, MD Taking Active   omega 3-dha-epa-fish oil (Fish OiL) 1,200 (144-216) mg capsule [id number] No Take 1 capsule (1,200 mg) by mouth once daily. Historical Provider, MD Taking Active   omeprazole (PriLOSEC) 40 mg DR capsule [id number] No Take 1 capsule (40 mg) by mouth once daily in the morning. Take before meals. Do not crush or chew. Historical Provider, MD Taking Active   SITagliptin phos-metformin (Janumet) 50-1,000 mg tablet [id number] No Take 1 tablet by mouth 2 times daily (morning and late afternoon). Historical Provider, MD Taking Active   tamsulosin (Flomax) 0.4 mg 24 hr capsule [id number] No Take 1 capsule (0.4 mg) by mouth once daily. Historical Provider, MD Taking Active   testosterone cypionate (Depo-Testosterone) 200 mg/mL injection [id number] No Inject 1 mL (200 mg) into the muscle every 14 (fourteen) days. Historical Provider, MD Taking Active   ticagrelor (Brilinta) 90 mg tablet [id number] No Take 1 tablet (90 mg) by mouth 2 times a day. MALIK Oakes Taking  10/11/24 2359   triamcinolone (Kenalog) 0.1 % cream [id number] No Apply topically once daily as needed for irritation. Historical Provider, MD Taking Active                   2024: Patient denies medication changes at this time.                   Medication compliance: Yes   Uses pill box/organizer: Yes    Carries medication list: Yes     Blood Pressure Management  History of Hypertension: Yes   Medication Changes: No   Resting BP: 109/69       Heart Failure Management  Hx of Heart Failure: No    Smoking/Tobacco Assessment  Social History     Tobacco Use   Smoking Status Former    Current  "packs/day: 0.00    Types: Cigarettes    Quit date: 1981    Years since quittin.8   Smokeless Tobacco Never       Other Core Component Plan    Goal Status: In progress    Other Core Component Goals:   Maintain a resting BP of <130/80 while in the program. (In progress)  Increase knowledge test score from 11/15 to 15/15 by discharge. (In progress)  Monitor blood pressure pre, during and post workout. (In progress)  Meet and discuss knowledge test within the first 12 sessions. (In progress)    Other Core Component Interventions/Education:   Educate the patient on the importance of carrying and updating medication list. (In progress)  2.   Handout given, \"Tips on taking medication.\" (In progress)  3.   Handouts given, \"Benefits of Cardiac Rehab\" and American [employer] Association's, \"What is Cardiac Rehab?\" Patient acknowledged understanding. 10/16/2024      Individual Patient Goals:    Stay healthy  Maintain or improve energy levels    Goal Status: In progress    Staff Comments:  30 Day Reassessment. Patient has completed 10 sessions of cardiac rehab. Patient feels they have improved 20% since beginning rehab. Patient is given daily goals in which he either meets or exceeds. Patient cardiac monitor shows NSR to ST on the monitor. Patient will receive cardiac workbook and his first 2 assignments on 2024. Patient encourages and interacts well with others in his class. Will reassess within 30 days.       Rehab Staff Signature: Sis Mcgee RN        "

## 2024-11-13 ENCOUNTER — CLINICAL SUPPORT (OUTPATIENT)
Dept: CARDIAC REHAB | Facility: HOSPITAL | Age: 74
End: 2024-11-13
Payer: MEDICARE

## 2024-11-13 DIAGNOSIS — I25.10 CORONARY ARTERY DISEASE INVOLVING NATIVE CORONARY ARTERY OF NATIVE HEART, UNSPECIFIED WHETHER ANGINA PRESENT: Primary | ICD-10-CM

## 2024-11-13 PROCEDURE — 93798 PHYS/QHP OP CAR RHAB W/ECG: CPT | Performed by: STUDENT IN AN ORGANIZED HEALTH CARE EDUCATION/TRAINING PROGRAM

## 2024-11-15 ENCOUNTER — CLINICAL SUPPORT (OUTPATIENT)
Dept: CARDIAC REHAB | Facility: HOSPITAL | Age: 74
End: 2024-11-15
Payer: MEDICARE

## 2024-11-15 DIAGNOSIS — I25.10 CORONARY ARTERY DISEASE INVOLVING NATIVE CORONARY ARTERY OF NATIVE HEART, UNSPECIFIED WHETHER ANGINA PRESENT: Primary | ICD-10-CM

## 2024-11-15 PROCEDURE — 93798 PHYS/QHP OP CAR RHAB W/ECG: CPT | Performed by: STUDENT IN AN ORGANIZED HEALTH CARE EDUCATION/TRAINING PROGRAM

## 2024-11-18 ENCOUNTER — CLINICAL SUPPORT (OUTPATIENT)
Dept: CARDIAC REHAB | Facility: HOSPITAL | Age: 74
End: 2024-11-18
Payer: MEDICARE

## 2024-11-18 DIAGNOSIS — I25.10 CORONARY ARTERY DISEASE INVOLVING NATIVE CORONARY ARTERY OF NATIVE HEART, UNSPECIFIED WHETHER ANGINA PRESENT: Primary | ICD-10-CM

## 2024-11-18 PROCEDURE — 93798 PHYS/QHP OP CAR RHAB W/ECG: CPT | Performed by: STUDENT IN AN ORGANIZED HEALTH CARE EDUCATION/TRAINING PROGRAM

## 2024-11-20 ENCOUNTER — APPOINTMENT (OUTPATIENT)
Dept: CARDIAC REHAB | Facility: HOSPITAL | Age: 74
End: 2024-11-20
Payer: MEDICARE

## 2024-11-22 ENCOUNTER — APPOINTMENT (OUTPATIENT)
Dept: CARDIAC REHAB | Facility: HOSPITAL | Age: 74
End: 2024-11-22
Payer: MEDICARE

## 2024-11-25 ENCOUNTER — CLINICAL SUPPORT (OUTPATIENT)
Dept: CARDIAC REHAB | Facility: HOSPITAL | Age: 74
End: 2024-11-25
Payer: MEDICARE

## 2024-11-25 DIAGNOSIS — I25.10 CORONARY ARTERY DISEASE INVOLVING NATIVE CORONARY ARTERY OF NATIVE HEART, UNSPECIFIED WHETHER ANGINA PRESENT: Primary | ICD-10-CM

## 2024-11-25 PROCEDURE — 93798 PHYS/QHP OP CAR RHAB W/ECG: CPT | Performed by: STUDENT IN AN ORGANIZED HEALTH CARE EDUCATION/TRAINING PROGRAM

## 2024-11-27 ENCOUNTER — CLINICAL SUPPORT (OUTPATIENT)
Dept: CARDIAC REHAB | Facility: HOSPITAL | Age: 74
End: 2024-11-27
Payer: MEDICARE

## 2024-11-27 DIAGNOSIS — I25.10 CORONARY ARTERY DISEASE INVOLVING NATIVE CORONARY ARTERY OF NATIVE HEART, UNSPECIFIED WHETHER ANGINA PRESENT: Primary | ICD-10-CM

## 2024-11-27 PROCEDURE — 93798 PHYS/QHP OP CAR RHAB W/ECG: CPT | Performed by: STUDENT IN AN ORGANIZED HEALTH CARE EDUCATION/TRAINING PROGRAM

## 2024-11-29 ENCOUNTER — CLINICAL SUPPORT (OUTPATIENT)
Dept: CARDIAC REHAB | Facility: HOSPITAL | Age: 74
End: 2024-11-29
Payer: MEDICARE

## 2024-11-29 DIAGNOSIS — I25.10 CORONARY ARTERY DISEASE INVOLVING NATIVE CORONARY ARTERY OF NATIVE HEART, UNSPECIFIED WHETHER ANGINA PRESENT: Primary | ICD-10-CM

## 2024-11-29 PROCEDURE — 93798 PHYS/QHP OP CAR RHAB W/ECG: CPT | Performed by: STUDENT IN AN ORGANIZED HEALTH CARE EDUCATION/TRAINING PROGRAM

## 2024-12-02 ENCOUNTER — CLINICAL SUPPORT (OUTPATIENT)
Dept: CARDIAC REHAB | Facility: HOSPITAL | Age: 74
End: 2024-12-02
Payer: MEDICARE

## 2024-12-02 DIAGNOSIS — I25.10 CORONARY ARTERY DISEASE INVOLVING NATIVE CORONARY ARTERY OF NATIVE HEART, UNSPECIFIED WHETHER ANGINA PRESENT: Primary | ICD-10-CM

## 2024-12-02 PROCEDURE — 93798 PHYS/QHP OP CAR RHAB W/ECG: CPT | Performed by: STUDENT IN AN ORGANIZED HEALTH CARE EDUCATION/TRAINING PROGRAM

## 2024-12-04 ENCOUNTER — CLINICAL SUPPORT (OUTPATIENT)
Dept: CARDIAC REHAB | Facility: HOSPITAL | Age: 74
End: 2024-12-04
Payer: MEDICARE

## 2024-12-04 DIAGNOSIS — I25.10 CORONARY ARTERY DISEASE INVOLVING NATIVE CORONARY ARTERY OF NATIVE HEART, UNSPECIFIED WHETHER ANGINA PRESENT: Primary | ICD-10-CM

## 2024-12-04 PROCEDURE — 93798 PHYS/QHP OP CAR RHAB W/ECG: CPT | Performed by: STUDENT IN AN ORGANIZED HEALTH CARE EDUCATION/TRAINING PROGRAM

## 2024-12-05 NOTE — PROGRESS NOTES
"  Cardiac Rehabilitation 60 Day Reassessment    Name: Gael Burnette  Medical Record Number: [id number]  YOB: 1950  Age: 74 y.o.    Today’s Date: 12/5/2024  Primary Care Physician: Kirill Rivera MD  Referring Physician: Gael GAN (Alise) Dr. Tyshawn Dixon  Program Location: [address]     Sessions Completed:  18  Exercise Start Date: 10/21/2024    General  Primary Diagnosis: Coronary Artery Disease involving native coronary artery of native heart, unspecified whether angina present   Onset/Date of Diagnosis: 09/10/2024    AACVPR Risk Stratification: Low    Falls Risk: Medium  Psychosocial Assessment     Pre PHQ-9: 1      Sent PH-Q 9 to MD if score > 20: No; score < 20    Pt reported/currently experiencing stress: No  Patient uses stress management skills: Yes   History of: no history of anxiety or depression  Currently seeing a mental health provider: No  Social Support: Yes, Whom:Wife and Children  Quality of Life Survey: SF-36   SF-36 Pre Post   Physical Component Score 44.47 TBD   Mental Component Score 62.66 TBD     Learning Assessment:  Learning assessment/barriers: None  Preferred learning method: Auditory, Visual, Reading handout, and Writing handout  Barriers: None  Comments:    Stages of Change:Action    Psychosocial Plan    Goal Status: In progress    Psychosocial Goals:   Maintain or decrease PHQ-9 score by decreasing the amount of days the patient has a poor appetite or overeats.  (Patient scored in \"none-minimal\" category, score of 1). (In progress)  Learn and utilize stress management skills and apply them to reported stressors while in the program. (In progress)  Question on new or current psychological issues at least every 30 days. (In progress)  Improve PCS and MCS scores by at least 5 points by discharge. (In progress)  Educate patient on ways to reduce stress and the importance of stress management in regards to cardiac health. (In " "progress)  Provide 1:1 emotional support as needed and facilitate peer support within the context of the other phase 2 patients. (In progress)     Psychosocial Interventions/Education:   Encourage patient to complete all emotional health and stress reduction activities and worksheets provided throughout the program. (In progress)  Patient rates stress at a 0 out of 10 at this time. 11/11/2024  Patient rates stress at a 0 out of 10 at this time. 12/4/2024      Nutrition Assessment:    Hyperlipidemia: Yes     Lipids:   Lab Results   Component Value Date    CHOL 149 09/09/2024    HDL 30.0 09/09/2024    TRIG 301 (H) 09/09/2024       Current Dietary Guidelines:   Barriers to dietary change: no    Diet Habit Survey: Picture Your Plate  Pre: 50  Post: To be done at discharge.    Diabetes Assessment    Lab Results   Component Value Date    HGBA1C 7.7 (H) 09/25/2024       History of Diabetes: Yes    Pt monitors BS at home: Yes   Frequency: 1-2 /day  FBS range: 140 - 170  Hypoglycemic Episodes: No     Weight Management  Height: 180.3 cm (5' 11\")  Weight: 89.6 kg (197 lb 9.6 oz)  BMI (Calculated): 27.57    Nutrition Plan    Goal Status: In progress    Nutrition Goals:   Increase PYP score to greater than 51 by discharge. (On admission, patient scored 50 which is in category, \" A dietary pattern that is also unlikely to meet current recommendations for good health and has room for some improvement.\" (In progress)  Provide education on nutritional aspects related to cardiac health and discuss dietary improvements while in the program. (Met, 11/13/2024)  Lose 1/2 inch from waist by discharge. ( 43 \" On admission). (In progress)   Gain knowledge on lipids and have a better understanding of lab results during the program. (In progress)   Discuss PYP scores within the first 12 sessions of the program. (Met, 11/13/2024)  Send referral to diabetic educator by end of program. (Met, 11/12/2024)      Nutrition Interventions/Education: " "  Informed patient that nutritional topics will be discussed including following of the Mediterranean Diet. Patient verbalized understanding. 10/16/2024  Explained to patient that education will be completed with book and workbook and expected for completion. Discussed with patient that there will be several different topics over the 12 weeks. Patient acknowledged understanding. 10/16/2024  Referral to diabetic educator sent on 11/12/2024.   Went over and discussed PYP scores with patient, including providing sheet with tips for improvement. Patient acknowledged understanding, 11/13/2024  Provided with [employer] calorie Sample Menu and Plan as well as handout, \" Healthy Eating for aging Adults.\" Patient accepted and verbalized understanding, 11/13/2024  Assigned Chapter 2 Nutrition Quiz.  Patient also assigned to review handouts on the Mediterranean diet including food pyramid, sample diet, and shopping list. Patient also given handouts, \"AHA's \"Heart Healthy Eating on a Budget,\" and \"Building a Heart Healthy Plate.\" Patient acknowledged understanding, 11/13/2024    Exercise Assessment    Yes  Mode: Aerobic & Strength Training, Cardio  Frequency: 2 days/ week  Duration: 60 minutes    Exercise Prescription     Exercise Prescription based on: 6 Minute Walk Test    6MWT: Yes  Pre Test O2 Sat/HR: 96/75  6 Minute O2 Sat/HR: 98/92  Distance Walked(ft): 1720  Nevin Dyspnea: 0  Nevin PRE: 0  Post Test O2 Sat/HR: 96/70  Adverse Reactions : none      Frequency:  3 days/week   Mode: Treadmill, NuStep, and Recumbent Cycle   Duration: >30 total aerobic minutes   Intensity: RPE <15  Target HR:   REST+30  MET Level: 5.6  Patient wears supplemental O2: No     Modality Workload METs Duration (minutes)   1 Pre-Exercise      2 Warm Up       3 Recumbent Bike 62 Esparza @4 5.6 12 :00   4 NuStep 88 Esparza @4 4.8 12 :00   5 Treadmill 2 mph  2.6 12 :00   6 Weights 3lb @ 10 2.6    7 Post-Exercise        Resistance Training: Yes  Home Exercise " "Prescription given: To be given prior to discharge from program.    Exercise Plan    Goal Status: In progress    Exercise Goals:   Increase mets to 6.0 by the end of the program.(In progress)  Gain independence and confidence with exercise while in the program. (In progress)  Have a plan to continue exercise after he completes the program. (In progress)  Increase mets by 1.0 every 6 weeks as tolerated. (In progress)     Exercise Interventions/Education:   \"What are exactly mets\" handout provided and discussed. Patient understood well. 10/16/2024  KENYON handout provided and discussed. Patient verbalized understanding. 10/16/2024  Patient reports exercising on off days by completing arm and leg conditioning exercises and usually 20 minutes of cardio. 11/11/2024  Patient currently exercising at 4.1 mets on the bicycle, 4.4 mets on the nustep, and 2.6 mets on the treadmill. Patient tolerating exercise well. 11/11/2024.  Patient currently exercising at 5.6 mets on the bicycle, 4.8 mets on the nustep, and 2.6 mets on the treadmill. Patient tolerating exercise well. Patient is also lifting 3 lb weights at this time. Patient tolerating weights well. 12/4/2024.      Other Core Components/Risk Factor Assessment:    Medication adherence  Current Medications:   Medication Documentation Review Audit       Reviewed by Sis Mcgee RN (Registered Nurse) on 10/16/24 at 1511      Medication Order Taking? Sig Documenting Provider Last Dose Status   ascorbic acid (Vitamin C) 1,000 mg tablet [id number] No Take 1 tablet (1,000 mg) by mouth once daily. Historical Provider, MD Taking Active   aspirin 81 mg EC tablet [id number] No Take 1 tablet (81 mg) by mouth once daily. Historical Provider, MD Taking Active   atorvastatin (Lipitor) 40 mg tablet [id number] No Take 1 tablet (40 mg) by mouth once daily at bedtime. Rach Francisco, APRN-CNP Taking Active   b complex 0.4 mg tablet [id number] No Take 1 tablet by mouth once daily. " Elliott Flores MD Taking Active   Cinnamon 500 mg capsule [id number] No Take 2 capsules (1,000 mg) by mouth 2 times a day. Elliott Flores MD Taking Active   empagliflozin (Jardiance) 25 mg [id number] No Take 1 tablet (25 mg) by mouth once daily. Elliott Flores MD Taking Active   glipiZIDE (Glucotrol) 5 mg tablet [id number] No Take 1 tablet (5 mg) by mouth 2 times a day before meals. Elliott Flores MD Taking Active   glucosamine-chondroitin (Osteo Bi-Flex) 250-200 mg tablet [id number] No Take 1 tablet by mouth once daily in the morning. Elliott Flores MD Taking Active   levothyroxine (Synthroid, Levoxyl) 112 mcg tablet [id number] No Take 1 tablet (112 mcg) by mouth early in the morning.. Take on an empty stomach at the same time each day, either 30 to 60 minutes prior to breakfast Elliott Flores MD Taking Active   lisinopril 5 mg tablet [id number] No Take 1 tablet (5 mg) by mouth once daily. Elliott Flores MD Taking Active   metoprolol succinate XL (Toprol-XL) 25 mg 24 hr tablet [id number] No Take 1 tablet (25 mg) by mouth once daily. Do not crush or chew. Do not fill before 2024. Rach Francisco, APRN-CNP Taking  10/12/24 2359   multivitamin tablet [id number] No Take 1 tablet by mouth once daily. Elliott Flores MD Taking Active   omega 3-dha-epa-fish oil (Fish OiL) 1,200 (144-216) mg capsule [id number] No Take 1 capsule (1,200 mg) by mouth once daily. Elliott Flores MD Taking Active   omeprazole (PriLOSEC) 40 mg DR capsule [id number] No Take 1 capsule (40 mg) by mouth once daily in the morning. Take before meals. Do not crush or chew. Elliott Flores MD Taking Active   SITagliptin phos-metformin (Janumet) 50-1,000 mg tablet [id number] No Take 1 tablet by mouth 2 times daily (morning and late afternoon). Elliott Flores MD Taking Active   tamsulosin (Flomax) 0.4 mg 24 hr capsule [id number] No Take 1 capsule (0.4 mg) by mouth once daily.  "Historical Provider, MD Taking Active   testosterone cypionate (Depo-Testosterone) 200 mg/mL injection [id number] No Inject 1 mL (200 mg) into the muscle every 14 (fourteen) days. Historical Provider, MD Taking Active   ticagrelor (Brilinta) 90 mg tablet [id number] No Take 1 tablet (90 mg) by mouth 2 times a day. Rach Francisco, APRN-CNP Taking  10/11/24 2358   triamcinolone (Kenalog) 0.1 % cream [id number] No Apply topically once daily as needed for irritation. Historical Provider, MD Taking Active                   2024: Patient denies medication changes at this time.   2024: Patient denies medication changes at this time.                   Medication compliance: Yes   Uses pill box/organizer: Yes    Carries medication list: Yes     Blood Pressure Management  History of Hypertension: Yes   Medication Changes: No   Resting BP: 121/74       Heart Failure Management  Hx of Heart Failure: No    Smoking/Tobacco Assessment  Social History     Tobacco Use   Smoking Status Former    Current packs/day: 0.00    Types: Cigarettes    Quit date:     Years since quittin.9   Smokeless Tobacco Never       Other Core Component Plan    Goal Status: In progress    Other Core Component Goals:   Maintain a resting BP of <130/80 while in the program. (In progress)  Increase knowledge test score from 11/15 to 15/15 by discharge. (In progress)  Monitor blood pressure pre, during and post workout. (In progress)  BP as of 2024: 109/69  BP as of 2024: 121/74  Meet and discuss knowledge test within the first 12 sessions. (Met, 2024)    Other Core Component Interventions/Education:   Educate the patient on the importance of carrying and updating medication list. (In progress)  2.   Handout given, \"Tips on taking medication.\" (In progress)  3.   Handouts given, \"Benefits of Cardiac Rehab\" and American [employer] Association's, \"What is Cardiac Rehab?\" Patient acknowledged understanding. 10/16/2024  4. "   Chapter 1: Cardiac and Stroke assigned to patient. Patient verbalized understanding of assignment. 11/13/2024  5.   Reviewed admission knowledge test scores with patient. Patient verbalized understanding. 11/13/2024        Individual Patient Goals:    Stay healthy  Maintain or improve energy levels    Goal Status: In progress    Staff Comments:  60 Day Reassessment. Patient has completed 18 sessions of cardiac rehab. Patient feels they have improved 25% since beginning rehab. [facility] reports [facility].  Patient is given daily goals in which he either meets or exceeds. Patient cardiac monitor shows NSR to ST on the monitor. Patient was assigned cardiac workbook and the first 2 chapters. Patient encourages and interacts well with others in his class. Will reassess within 30 days.       Rehab Staff Signature: Sis Mcgee RN

## 2024-12-06 ENCOUNTER — APPOINTMENT (OUTPATIENT)
Dept: CARDIAC REHAB | Facility: HOSPITAL | Age: 74
End: 2024-12-06
Payer: MEDICARE

## 2024-12-09 ENCOUNTER — CLINICAL SUPPORT (OUTPATIENT)
Dept: CARDIAC REHAB | Facility: HOSPITAL | Age: 74
End: 2024-12-09
Payer: MEDICARE

## 2024-12-09 DIAGNOSIS — I25.10 CORONARY ARTERY DISEASE INVOLVING NATIVE CORONARY ARTERY OF NATIVE HEART, UNSPECIFIED WHETHER ANGINA PRESENT: Primary | ICD-10-CM

## 2024-12-09 PROCEDURE — 93798 PHYS/QHP OP CAR RHAB W/ECG: CPT | Performed by: STUDENT IN AN ORGANIZED HEALTH CARE EDUCATION/TRAINING PROGRAM

## 2024-12-11 ENCOUNTER — CLINICAL SUPPORT (OUTPATIENT)
Dept: CARDIAC REHAB | Facility: HOSPITAL | Age: 74
End: 2024-12-11
Payer: MEDICARE

## 2024-12-11 VITALS — SYSTOLIC BLOOD PRESSURE: 116 MMHG | DIASTOLIC BLOOD PRESSURE: 66 MMHG

## 2024-12-11 DIAGNOSIS — I25.10 CORONARY ARTERY DISEASE INVOLVING NATIVE CORONARY ARTERY OF NATIVE HEART, UNSPECIFIED WHETHER ANGINA PRESENT: Primary | ICD-10-CM

## 2024-12-11 PROCEDURE — 93798 PHYS/QHP OP CAR RHAB W/ECG: CPT | Performed by: STUDENT IN AN ORGANIZED HEALTH CARE EDUCATION/TRAINING PROGRAM

## 2024-12-13 ENCOUNTER — CLINICAL SUPPORT (OUTPATIENT)
Dept: CARDIAC REHAB | Facility: HOSPITAL | Age: 74
End: 2024-12-13
Payer: MEDICARE

## 2024-12-13 VITALS — DIASTOLIC BLOOD PRESSURE: 63 MMHG | SYSTOLIC BLOOD PRESSURE: 115 MMHG

## 2024-12-13 DIAGNOSIS — I25.10 CORONARY ARTERY DISEASE INVOLVING NATIVE CORONARY ARTERY OF NATIVE HEART, UNSPECIFIED WHETHER ANGINA PRESENT: Primary | ICD-10-CM

## 2024-12-13 PROCEDURE — 93798 PHYS/QHP OP CAR RHAB W/ECG: CPT | Performed by: STUDENT IN AN ORGANIZED HEALTH CARE EDUCATION/TRAINING PROGRAM

## 2024-12-16 ENCOUNTER — CLINICAL SUPPORT (OUTPATIENT)
Dept: CARDIAC REHAB | Facility: HOSPITAL | Age: 74
End: 2024-12-16
Payer: MEDICARE

## 2024-12-16 VITALS — SYSTOLIC BLOOD PRESSURE: 121 MMHG | DIASTOLIC BLOOD PRESSURE: 71 MMHG

## 2024-12-16 DIAGNOSIS — I25.10 CORONARY ARTERY DISEASE INVOLVING NATIVE CORONARY ARTERY OF NATIVE HEART, UNSPECIFIED WHETHER ANGINA PRESENT: Primary | ICD-10-CM

## 2024-12-16 PROCEDURE — 93798 PHYS/QHP OP CAR RHAB W/ECG: CPT | Performed by: STUDENT IN AN ORGANIZED HEALTH CARE EDUCATION/TRAINING PROGRAM

## 2024-12-18 ENCOUNTER — CLINICAL SUPPORT (OUTPATIENT)
Dept: CARDIAC REHAB | Facility: HOSPITAL | Age: 74
End: 2024-12-18
Payer: MEDICARE

## 2024-12-18 VITALS — SYSTOLIC BLOOD PRESSURE: 120 MMHG | DIASTOLIC BLOOD PRESSURE: 61 MMHG

## 2024-12-18 DIAGNOSIS — I25.10 CORONARY ARTERY DISEASE INVOLVING NATIVE CORONARY ARTERY OF NATIVE HEART, UNSPECIFIED WHETHER ANGINA PRESENT: Primary | ICD-10-CM

## 2024-12-18 PROCEDURE — 93798 PHYS/QHP OP CAR RHAB W/ECG: CPT | Performed by: STUDENT IN AN ORGANIZED HEALTH CARE EDUCATION/TRAINING PROGRAM

## 2024-12-20 ENCOUNTER — CLINICAL SUPPORT (OUTPATIENT)
Dept: CARDIAC REHAB | Facility: HOSPITAL | Age: 74
End: 2024-12-20
Payer: MEDICARE

## 2024-12-20 VITALS — SYSTOLIC BLOOD PRESSURE: 96 MMHG | DIASTOLIC BLOOD PRESSURE: 59 MMHG

## 2024-12-20 DIAGNOSIS — I25.10 CORONARY ARTERY DISEASE INVOLVING NATIVE CORONARY ARTERY OF NATIVE HEART, UNSPECIFIED WHETHER ANGINA PRESENT: Primary | ICD-10-CM

## 2024-12-20 PROCEDURE — 93798 PHYS/QHP OP CAR RHAB W/ECG: CPT | Performed by: STUDENT IN AN ORGANIZED HEALTH CARE EDUCATION/TRAINING PROGRAM

## 2024-12-23 ENCOUNTER — CLINICAL SUPPORT (OUTPATIENT)
Dept: CARDIAC REHAB | Facility: HOSPITAL | Age: 74
End: 2024-12-23
Payer: MEDICARE

## 2024-12-23 VITALS — SYSTOLIC BLOOD PRESSURE: 125 MMHG | DIASTOLIC BLOOD PRESSURE: 70 MMHG

## 2024-12-23 DIAGNOSIS — I25.10 CORONARY ARTERY DISEASE INVOLVING NATIVE CORONARY ARTERY OF NATIVE HEART, UNSPECIFIED WHETHER ANGINA PRESENT: Primary | ICD-10-CM

## 2024-12-23 PROCEDURE — 93798 PHYS/QHP OP CAR RHAB W/ECG: CPT | Performed by: STUDENT IN AN ORGANIZED HEALTH CARE EDUCATION/TRAINING PROGRAM

## 2024-12-27 ENCOUNTER — CLINICAL SUPPORT (OUTPATIENT)
Dept: CARDIAC REHAB | Facility: HOSPITAL | Age: 74
End: 2024-12-27
Payer: MEDICARE

## 2024-12-27 DIAGNOSIS — I25.10 CORONARY ARTERY DISEASE INVOLVING NATIVE CORONARY ARTERY OF NATIVE HEART, UNSPECIFIED WHETHER ANGINA PRESENT: Primary | ICD-10-CM

## 2024-12-27 PROCEDURE — 93798 PHYS/QHP OP CAR RHAB W/ECG: CPT | Performed by: STUDENT IN AN ORGANIZED HEALTH CARE EDUCATION/TRAINING PROGRAM

## 2024-12-30 ENCOUNTER — CLINICAL SUPPORT (OUTPATIENT)
Dept: CARDIAC REHAB | Facility: HOSPITAL | Age: 74
End: 2024-12-30
Payer: MEDICARE

## 2024-12-30 VITALS — SYSTOLIC BLOOD PRESSURE: 122 MMHG | DIASTOLIC BLOOD PRESSURE: 70 MMHG

## 2024-12-30 DIAGNOSIS — I25.10 CORONARY ARTERY DISEASE INVOLVING NATIVE CORONARY ARTERY OF NATIVE HEART, UNSPECIFIED WHETHER ANGINA PRESENT: Primary | ICD-10-CM

## 2024-12-30 PROCEDURE — 93798 PHYS/QHP OP CAR RHAB W/ECG: CPT | Performed by: STUDENT IN AN ORGANIZED HEALTH CARE EDUCATION/TRAINING PROGRAM

## 2024-12-30 NOTE — PROGRESS NOTES
"  Cardiac Rehabilitation 90 Day Reassessment    Name: Gael Burnette  Medical Record Number: [id number]  YOB: 1950  Age: 74 y.o.    Today’s Date: 12/30/2024  Primary Care Physician: Kirill Rivera MD  Referring Physician: Gael GAN (Alise) Dr. Tyshawn Dixon  Program Location: [address]     Sessions Completed:  26  Exercise Start Date: 10/21/2024    General  Primary Diagnosis: Coronary Artery Disease involving native coronary artery of native heart, unspecified whether angina present   Onset/Date of Diagnosis: 09/10/2024    AACVPR Risk Stratification: Low    Falls Risk: Medium  Psychosocial Assessment     Pre PHQ-9: 1      Sent PH-Q 9 to MD if score > 20: No; score < 20    Pt reported/currently experiencing stress: No  Patient uses stress management skills: Yes   History of: no history of anxiety or depression  Currently seeing a mental health provider: No  Social Support: Yes, Whom:Wife and Children  Quality of Life Survey: SF-36   SF-36 Pre Post   Physical Component Score 44.47 TBD   Mental Component Score 62.66 TBD     Learning Assessment:  Learning assessment/barriers: None  Preferred learning method: Auditory, Visual, Reading handout, and Writing handout  Barriers: None  Comments:    Stages of Change:Action    Psychosocial Plan    Goal Status: In progress    Psychosocial Goals:   Maintain or decrease PHQ-9 score by decreasing the amount of days the patient has a poor appetite or overeats.  (Patient scored in \"none-minimal\" category, score of 1). (In progress)  Learn and utilize stress management skills and apply them to reported stressors while in the program. (In progress)  Question on new or current psychological issues at least every 30 days. (In progress)  Improve PCS and MCS scores by at least 5 points by discharge. (In progress)  Educate patient on ways to reduce stress and the importance of stress management in regards to cardiac health. (In " "progress)  Provide 1:1 emotional support as needed and facilitate peer support within the context of the other phase 2 patients. (In progress)     Psychosocial Interventions/Education:   Encourage patient to complete all emotional health and stress reduction activities and worksheets provided throughout the program. (In progress)  Patient rates stress at a 0 out of 10 at this time. 11/11/2024  Patient rates stress at a 0 out of 10 at this time. 12/4/2024  Patient reports stress at 0 out of 10 at this time, 12/27/24.        Nutrition Assessment:    Hyperlipidemia: Yes     Lipids:   Lab Results   Component Value Date    CHOL 149 09/09/2024    HDL 30.0 09/09/2024    TRIG 301 (H) 09/09/2024       Current Dietary Guidelines:   Barriers to dietary change: no    Diet Habit Survey: Picture Your Plate  Pre: 50  Post: To be done at discharge.    Diabetes Assessment    Lab Results   Component Value Date    HGBA1C 7.7 (H) 09/25/2024       History of Diabetes: Yes    Pt monitors BS at home: Yes   Frequency: 1-2 /day  FBS range: 140 - 170  Hypoglycemic Episodes: No     Weight Management  Height: 180.3 cm (5' 11\")  Weight: 89.6 kg (197 lb 9.6 oz)  BMI (Calculated): 27.57    Nutrition Plan    Goal Status: In progress    Nutrition Goals:   Increase PYP score to greater than 51 by discharge. (On admission, patient scored 50 which is in category, \" A dietary pattern that is also unlikely to meet current recommendations for good health and has room for some improvement.\" (In progress)  Provide education on nutritional aspects related to cardiac health and discuss dietary improvements while in the program. (Met, 11/13/2024)  Lose 1/2 inch from waist by discharge. ( 43 \" On admission). (In progress)   Gain knowledge on lipids and have a better understanding of lab results during the program. (In progress)   Discuss PYP scores within the first 12 sessions of the program. (Met, 11/13/2024)  Send referral to diabetic educator by end of " "program. (Met, 11/12/2024)      Nutrition Interventions/Education:   Informed patient that nutritional topics will be discussed including following of the Mediterranean Diet. Patient verbalized understanding. 10/16/2024  Explained to patient that education will be completed with book and workbook and expected for completion. Discussed with patient that there will be several different topics over the 12 weeks. Patient acknowledged understanding. 10/16/2024  Referral to diabetic educator sent on 11/12/2024.   Went over and discussed PYP scores with patient, including providing sheet with tips for improvement. Patient acknowledged understanding, 11/13/2024  Provided with MYPLATE calorie Sample Menu and Plan as well as handout, \" Healthy Eating for aging Adults.\" Patient accepted and verbalized understanding, 11/13/2024  Assigned Chapter 2 Nutrition Quiz.  Patient also assigned to review handouts on the Mediterranean diet including food pyramid, sample diet, and shopping list. Patient also given handouts, \"AHA's \"Heart Healthy Eating on a Budget,\" and \"Building a Heart Healthy Plate.\" Patient acknowledged understanding, 11/13/2024  Assigned Chapter 5: High Cholesterol Quiz. Discussed and provided verbal education on patient's last lipid levels reported. Discussed both HDL and LDL cholesterol as well as goals for total cholesterol and triglycerides. Assigned patient to read handouts, \"AHA's What is Cholesterol?\", \"How to control cholesterol,\" \"Know your Cholesterol Numbers\" and \"How do my cholesterol levels affect my risk of heart attack and stroke?\" Patient verbalized understanding, 12/30/24.   Assigned Chapter 5: Understanding Diabetes quiz. Assigned patient to read handout,\" Take Diabetes to heart,\" \"Know your numbers,\" and  \"ADA's, \"Food for thought.\" Patient verbalized understanding.  Chapter 5 High Blood Pressure Quiz. Instructed patient to review handouts, \"Use herbs and spices instead of salt,\" and \"Consequences " "of high blood pressure.\" 12/30/24.    Exercise Assessment    Yes  Mode: Aerobic & Strength Training, Cardio  Frequency: 2 days/ week  Duration: 60 minutes    Exercise Prescription     Exercise Prescription based on: 6 Minute Walk Test    6MWT: Yes  Pre Test O2 Sat/HR: 96/75  6 Minute O2 Sat/HR: 98/92  Distance Walked(ft): 1720  Nevin Dyspnea: 0  Nevin PRE: 0  Post Test O2 Sat/HR: 96/70  Adverse Reactions : none      Frequency:  3 days/week   Mode: Treadmill, NuStep, and Recumbent Cycle   Duration: >30 total aerobic minutes   Intensity: RPE <15  Target HR:   REST+30  MET Level: 5.5  Patient wears supplemental O2: No     Modality Workload METs Duration (minutes)   1 Pre-Exercise      2 Warm Up       3 Recumbent Bike 60 Esparza @4 5.5 12 :00   4 NuStep 72Watts @5 4.4 12 :00   5 Treadmill 2.5mph@0%  2.9 12 :00   6 Weights 5lb @ 10 2.6    7 Post-Exercise        Resistance Training: Yes  Home Exercise Prescription given: To be given prior to discharge from program.    Exercise Plan    Goal Status: In progress    Exercise Goals:   Increase mets to 6.0 by the end of the program.(In progress)  Gain independence and confidence with exercise while in the program. (In progress)  Have a plan to continue exercise after he completes the program. (In progress)  Increase mets by 1.0 every 6 weeks as tolerated. (In progress)     Exercise Interventions/Education:   \"What are exactly mets\" handout provided and discussed. Patient understood well. 10/16/2024  NEVIN handout provided and discussed. Patient verbalized understanding. 10/16/2024  Patient reports exercising on off days by completing arm and leg conditioning exercises and usually 20 minutes of cardio. 11/11/2024  Patient currently exercising at 4.1 mets on the bicycle, 4.4 mets on the nustep, and 2.6 mets on the treadmill. Patient tolerating exercise well. 11/11/2024.  Patient currently exercising at 5.6 mets on the bicycle, 4.8 mets on the nustep, and 2.6 mets on the treadmill. " "Patient tolerating exercise well. Patient is also lifting 3 lb weights at this time. Patient tolerating weights well. 12/4/2024.  Assigned Chapter 3: Exercise More. Patient also to read handouts, \"Aerobic Exercise,\" and \"Real Benefits of Exerise.\" Patient encouraged to begin home exercise program for 20-30 minutes on days when not exercising at cardiac rehab. Patient verbalized understanding, 12/30/24.  Patient continues to exercise on days not at rehab 2 days a week x 60 minutes, 12/30/24.      Other Core Components/Risk Factor Assessment:    Medication adherence  Current Medications:   Medication Documentation Review Audit       Reviewed by Sis Mcgee RN (Registered Nurse) on 10/16/24 at 1511      Medication Order Taking? Sig Documenting Provider Last Dose Status   ascorbic acid (Vitamin C) 1,000 mg tablet [id number] No Take 1 tablet (1,000 mg) by mouth once daily. Historical Provider, MD Taking Active   aspirin 81 mg EC tablet [id number] No Take 1 tablet (81 mg) by mouth once daily. Historical Provider, MD Taking Active   atorvastatin (Lipitor) 40 mg tablet [id number] No Take 1 tablet (40 mg) by mouth once daily at bedtime. Rach Francisco, APRN-CNP Taking Active   b complex 0.4 mg tablet [id number] No Take 1 tablet by mouth once daily. Historical Provider, MD Taking Active   Cinnamon 500 mg capsule [id number] No Take 2 capsules (1,000 mg) by mouth 2 times a day. Historical Provider, MD Taking Active   empagliflozin (Jardiance) 25 mg [id number] No Take 1 tablet (25 mg) by mouth once daily. Historical Provider, MD Taking Active   glipiZIDE (Glucotrol) 5 mg tablet [id number] No Take 1 tablet (5 mg) by mouth 2 times a day before meals. Historical Provider, MD Taking Active   glucosamine-chondroitin (Osteo Bi-Flex) 250-200 mg tablet [id number] No Take 1 tablet by mouth once daily in the morning. Historical Provider, MD Taking Active   levothyroxine (Synthroid, Levoxyl) 112 mcg tablet [id number] No Take 1 tablet (112 " mcg) by mouth early in the morning.. Take on an empty stomach at the same time each day, either 30 to 60 minutes prior to breakfast Historical MD Mark Taking Active   lisinopril 5 mg tablet [id number] No Take 1 tablet (5 mg) by mouth once daily. Elliott Flores MD Taking Active   metoprolol succinate XL (Toprol-XL) 25 mg 24 hr tablet [id number] No Take 1 tablet (25 mg) by mouth once daily. Do not crush or chew. Do not fill before 2024. MALIK Oakes Taking  10/12/24 2359   multivitamin tablet [id number] No Take 1 tablet by mouth once daily. Historical MD Mark Taking Active   omega 3-dha-epa-fish oil (Fish OiL) 1,200 (144-216) mg capsule [id number] No Take 1 capsule (1,200 mg) by mouth once daily. Elliott Flores MD Taking Active   omeprazole (PriLOSEC) 40 mg DR capsule [id number] No Take 1 capsule (40 mg) by mouth once daily in the morning. Take before meals. Do not crush or chew. Historical MD Mark Taking Active   SITagliptin phos-metformin (Janumet) 50-1,000 mg tablet [id number] No Take 1 tablet by mouth 2 times daily (morning and late afternoon). Elliott Flores MD Taking Active   tamsulosin (Flomax) 0.4 mg 24 hr capsule [id number] No Take 1 capsule (0.4 mg) by mouth once daily. Elliott Flores MD Taking Active   testosterone cypionate (Depo-Testosterone) 200 mg/mL injection [id number] No Inject 1 mL (200 mg) into the muscle every 14 (fourteen) days. Elliott Flores MD Taking Active   ticagrelor (Brilinta) 90 mg tablet [id number] No Take 1 tablet (90 mg) by mouth 2 times a day. MALIK Oakes Taking  10/11/24 2359   triamcinolone (Kenalog) 0.1 % cream [id number] No Apply topically once daily as needed for irritation. Elliott Flores MD Taking Active                   2024: Patient denies medication changes at this time.   2024: Patient denies medication changes at this time.   2024: Patient denies  "medication changes at this time.                  Medication compliance: Yes   Uses pill box/organizer: Yes    Carries medication list: Yes     Blood Pressure Management  History of Hypertension: Yes   Medication Changes: No   Resting BP: 121/74  Resting BP: 132/74 as of 24       Heart Failure Management  Hx of Heart Failure: No    Smoking/Tobacco Assessment  Social History     Tobacco Use   Smoking Status Former    Current packs/day: 0.00    Types: Cigarettes    Quit date:     Years since quittin.0   Smokeless Tobacco Never       Other Core Component Plan    Goal Status: In progress    Other Core Component Goals:   Maintain a resting BP of <130/80 while in the program. (In progress)  Increase knowledge test score from 11/15 to 15/15 by discharge. (In progress)  Monitor blood pressure pre, during and post workout. (In progress)  BP as of 2024: 109/69  BP as of 2024: 121/74  BP as of 2024: 132/74  Meet and discuss knowledge test within the first 12 sessions. (Met, 2024)    Other Core Component Interventions/Education:   Educate the patient on the importance of carrying and updating medication list. (In progress)  2.   Handout given, \"Tips on taking medication.\" (In progress)  3.   Handouts given, \"Benefits of Cardiac Rehab\" and American Heart Association's, \"What is Cardiac Rehab?\" Patient acknowledged understanding. 10/16/2024  4.   Chapter 1: Cardiac and Stroke assigned to patient. Patient verbalized understanding of assignment. 2024.   5.   Reviewed admission knowledge test scores with patient. Patient verbalized understanding. 2024  6. Chapter 4: Medications quiz assigned. Patient to review, \"[facility]: medications after a heart attack,\" and \"How do medications prevent heart attacks.\" 24. Patient verbalized understanding, 24.        Individual Patient Goals:    Stay healthy  Maintain or improve energy levels    Goal Status: In progress    Staff Comments:  90 " Day Reassessment. Patient has completed 26 sessions of cardiac rehab. Patient feels to have improved 50% since beginning rehab.  Patient cardiac monitor shows NSR to ST on the monitor. Patient encourages and interacts well with others in his class.  The patient feels he has learned the most about increasing exercise for heart health and and has increased exercise in his everyday life since beginning cardiac rehab.       Rehab Staff Signature: Corina Richmond, RRT

## 2025-01-03 ENCOUNTER — CLINICAL SUPPORT (OUTPATIENT)
Dept: CARDIAC REHAB | Facility: HOSPITAL | Age: 75
End: 2025-01-03
Payer: MEDICARE

## 2025-01-03 DIAGNOSIS — I25.10 CORONARY ARTERY DISEASE INVOLVING NATIVE CORONARY ARTERY OF NATIVE HEART, UNSPECIFIED WHETHER ANGINA PRESENT: Primary | ICD-10-CM

## 2025-01-03 PROCEDURE — 93798 PHYS/QHP OP CAR RHAB W/ECG: CPT | Performed by: STUDENT IN AN ORGANIZED HEALTH CARE EDUCATION/TRAINING PROGRAM

## 2025-01-06 ENCOUNTER — CLINICAL SUPPORT (OUTPATIENT)
Dept: CARDIAC REHAB | Facility: HOSPITAL | Age: 75
End: 2025-01-06
Payer: MEDICARE

## 2025-01-06 DIAGNOSIS — I25.10 CORONARY ARTERY DISEASE INVOLVING NATIVE CORONARY ARTERY OF NATIVE HEART, UNSPECIFIED WHETHER ANGINA PRESENT: Primary | ICD-10-CM

## 2025-01-06 PROCEDURE — 93798 PHYS/QHP OP CAR RHAB W/ECG: CPT | Performed by: STUDENT IN AN ORGANIZED HEALTH CARE EDUCATION/TRAINING PROGRAM

## 2025-01-08 ENCOUNTER — HOSPITAL ENCOUNTER (OUTPATIENT)
Dept: CARDIOLOGY | Facility: HOSPITAL | Age: 75
Discharge: HOME | End: 2025-01-08
Payer: MEDICARE

## 2025-01-08 ENCOUNTER — CLINICAL SUPPORT (OUTPATIENT)
Dept: CARDIAC REHAB | Facility: HOSPITAL | Age: 75
End: 2025-01-08
Payer: MEDICARE

## 2025-01-08 DIAGNOSIS — I25.10 CORONARY ARTERY DISEASE INVOLVING NATIVE CORONARY ARTERY OF NATIVE HEART, UNSPECIFIED WHETHER ANGINA PRESENT: ICD-10-CM

## 2025-01-08 DIAGNOSIS — I25.10 CORONARY ARTERY DISEASE INVOLVING NATIVE CORONARY ARTERY OF NATIVE HEART, UNSPECIFIED WHETHER ANGINA PRESENT: Primary | ICD-10-CM

## 2025-01-08 PROCEDURE — 93798 PHYS/QHP OP CAR RHAB W/ECG: CPT | Performed by: STUDENT IN AN ORGANIZED HEALTH CARE EDUCATION/TRAINING PROGRAM

## 2025-01-08 PROCEDURE — 93005 ELECTROCARDIOGRAM TRACING: CPT

## 2025-01-08 NOTE — PROGRESS NOTES
Patient reported chest pain and shortness of breath while exercising on the bike. Patient stopped exercise and rested. BP was 168/84 initially and heart rate was 92. EKG was performed. BP was then 163/78 after 5 minutes of rest HR returned to 77. Patient states that the pain went away with rest. EKG was taken to ER physician Dr. Ortiz and was read by him. Dr. Ortiz states the EKG was NSR. Patient continued exercise without difficulty. Patient was instructed to return to the ER if symptoms returned or worsened, patient verbalized understanding.

## 2025-01-10 ENCOUNTER — CLINICAL SUPPORT (OUTPATIENT)
Dept: CARDIAC REHAB | Facility: HOSPITAL | Age: 75
End: 2025-01-10
Payer: MEDICARE

## 2025-01-10 VITALS — DIASTOLIC BLOOD PRESSURE: 72 MMHG | SYSTOLIC BLOOD PRESSURE: 115 MMHG

## 2025-01-10 DIAGNOSIS — I25.10 CORONARY ARTERY DISEASE INVOLVING NATIVE CORONARY ARTERY OF NATIVE HEART, UNSPECIFIED WHETHER ANGINA PRESENT: Primary | ICD-10-CM

## 2025-01-10 LAB
ATRIAL RATE: 74 BPM
P AXIS: 69 DEGREES
P OFFSET: 189 MS
P ONSET: 132 MS
PR INTERVAL: 184 MS
Q ONSET: 224 MS
QRS COUNT: 12 BEATS
QRS DURATION: 112 MS
QT INTERVAL: 394 MS
QTC CALCULATION(BAZETT): 437 MS
QTC FREDERICIA: 422 MS
R AXIS: -2 DEGREES
T AXIS: 44 DEGREES
T OFFSET: 421 MS
VENTRICULAR RATE: 74 BPM

## 2025-01-10 PROCEDURE — 93798 PHYS/QHP OP CAR RHAB W/ECG: CPT | Performed by: STUDENT IN AN ORGANIZED HEALTH CARE EDUCATION/TRAINING PROGRAM

## 2025-01-13 ENCOUNTER — HOSPITAL ENCOUNTER (OUTPATIENT)
Dept: CARDIOLOGY | Facility: HOSPITAL | Age: 75
Discharge: HOME | End: 2025-01-13
Payer: MEDICARE

## 2025-01-13 ENCOUNTER — APPOINTMENT (OUTPATIENT)
Dept: RADIOLOGY | Facility: HOSPITAL | Age: 75
End: 2025-01-13
Payer: MEDICARE

## 2025-01-13 ENCOUNTER — APPOINTMENT (OUTPATIENT)
Dept: CARDIAC REHAB | Facility: HOSPITAL | Age: 75
End: 2025-01-13
Payer: MEDICARE

## 2025-01-13 ENCOUNTER — HOSPITAL ENCOUNTER (EMERGENCY)
Facility: HOSPITAL | Age: 75
Discharge: HOME | End: 2025-01-13
Attending: EMERGENCY MEDICINE
Payer: MEDICARE

## 2025-01-13 VITALS
RESPIRATION RATE: 16 BRPM | HEART RATE: 67 BPM | BODY MASS INDEX: 27.92 KG/M2 | HEIGHT: 70 IN | WEIGHT: 195 LBS | SYSTOLIC BLOOD PRESSURE: 113 MMHG | TEMPERATURE: 97.8 F | DIASTOLIC BLOOD PRESSURE: 63 MMHG | OXYGEN SATURATION: 97 %

## 2025-01-13 DIAGNOSIS — I20.89 EXERTIONAL ANGINA (CMS-HCC): ICD-10-CM

## 2025-01-13 DIAGNOSIS — R07.9 ACUTE CHEST PAIN: Primary | ICD-10-CM

## 2025-01-13 LAB
ALBUMIN SERPL BCP-MCNC: 4 G/DL (ref 3.4–5)
ALP SERPL-CCNC: 90 U/L (ref 33–136)
ALT SERPL W P-5'-P-CCNC: 19 U/L (ref 10–52)
ANION GAP SERPL CALC-SCNC: 10 MMOL/L (ref 10–20)
AST SERPL W P-5'-P-CCNC: 20 U/L (ref 9–39)
BASOPHILS # BLD AUTO: 0.04 X10*3/UL (ref 0–0.1)
BASOPHILS NFR BLD AUTO: 0.5 %
BILIRUB SERPL-MCNC: 0.5 MG/DL (ref 0–1.2)
BUN SERPL-MCNC: 25 MG/DL (ref 6–23)
CALCIUM SERPL-MCNC: 9.3 MG/DL (ref 8.6–10.3)
CARDIAC TROPONIN I PNL SERPL HS: 5 NG/L (ref 0–20)
CARDIAC TROPONIN I PNL SERPL HS: 7 NG/L (ref 0–20)
CHLORIDE SERPL-SCNC: 102 MMOL/L (ref 98–107)
CO2 SERPL-SCNC: 27 MMOL/L (ref 21–32)
CREAT SERPL-MCNC: 1.18 MG/DL (ref 0.5–1.3)
EGFRCR SERPLBLD CKD-EPI 2021: 66 ML/MIN/1.73M*2
EOSINOPHIL # BLD AUTO: 0.41 X10*3/UL (ref 0–0.4)
EOSINOPHIL NFR BLD AUTO: 5 %
ERYTHROCYTE [DISTWIDTH] IN BLOOD BY AUTOMATED COUNT: 12.5 % (ref 11.5–14.5)
GLUCOSE SERPL-MCNC: 202 MG/DL (ref 74–99)
HCT VFR BLD AUTO: 44.8 % (ref 41–52)
HGB BLD-MCNC: 15.2 G/DL (ref 13.5–17.5)
IMM GRANULOCYTES # BLD AUTO: 0.03 X10*3/UL (ref 0–0.5)
IMM GRANULOCYTES NFR BLD AUTO: 0.4 % (ref 0–0.9)
LYMPHOCYTES # BLD AUTO: 2.5 X10*3/UL (ref 0.8–3)
LYMPHOCYTES NFR BLD AUTO: 30.2 %
MCH RBC QN AUTO: 31.7 PG (ref 26–34)
MCHC RBC AUTO-ENTMCNC: 33.9 G/DL (ref 32–36)
MCV RBC AUTO: 93 FL (ref 80–100)
MONOCYTES # BLD AUTO: 0.61 X10*3/UL (ref 0.05–0.8)
MONOCYTES NFR BLD AUTO: 7.4 %
NEUTROPHILS # BLD AUTO: 4.69 X10*3/UL (ref 1.6–5.5)
NEUTROPHILS NFR BLD AUTO: 56.5 %
NRBC BLD-RTO: 0 /100 WBCS (ref 0–0)
PLATELET # BLD AUTO: 246 X10*3/UL (ref 150–450)
POTASSIUM SERPL-SCNC: 4.1 MMOL/L (ref 3.5–5.3)
PROT SERPL-MCNC: 6.3 G/DL (ref 6.4–8.2)
RBC # BLD AUTO: 4.8 X10*6/UL (ref 4.5–5.9)
SODIUM SERPL-SCNC: 135 MMOL/L (ref 136–145)
WBC # BLD AUTO: 8.3 X10*3/UL (ref 4.4–11.3)

## 2025-01-13 PROCEDURE — 36415 COLL VENOUS BLD VENIPUNCTURE: CPT | Performed by: EMERGENCY MEDICINE

## 2025-01-13 PROCEDURE — 99284 EMERGENCY DEPT VISIT MOD MDM: CPT | Performed by: EMERGENCY MEDICINE

## 2025-01-13 PROCEDURE — 84484 ASSAY OF TROPONIN QUANT: CPT | Performed by: EMERGENCY MEDICINE

## 2025-01-13 PROCEDURE — 71045 X-RAY EXAM CHEST 1 VIEW: CPT | Performed by: RADIOLOGY

## 2025-01-13 PROCEDURE — 2500000005 HC RX 250 GENERAL PHARMACY W/O HCPCS: Performed by: EMERGENCY MEDICINE

## 2025-01-13 PROCEDURE — 85025 COMPLETE CBC W/AUTO DIFF WBC: CPT | Performed by: EMERGENCY MEDICINE

## 2025-01-13 PROCEDURE — 2500000001 HC RX 250 WO HCPCS SELF ADMINISTERED DRUGS (ALT 637 FOR MEDICARE OP): Performed by: EMERGENCY MEDICINE

## 2025-01-13 PROCEDURE — 93005 ELECTROCARDIOGRAM TRACING: CPT

## 2025-01-13 PROCEDURE — 71045 X-RAY EXAM CHEST 1 VIEW: CPT

## 2025-01-13 PROCEDURE — 80053 COMPREHEN METABOLIC PANEL: CPT | Performed by: EMERGENCY MEDICINE

## 2025-01-13 RX ORDER — ASPIRIN 325 MG
325 TABLET ORAL ONCE
Status: COMPLETED | OUTPATIENT
Start: 2025-01-13 | End: 2025-01-13

## 2025-01-13 RX ADMIN — NITROGLYCERIN 0.5 INCH: 20 OINTMENT TOPICAL at 15:53

## 2025-01-13 RX ADMIN — ASPIRIN 325 MG ORAL TABLET 325 MG: 325 PILL ORAL at 15:52

## 2025-01-13 ASSESSMENT — ENCOUNTER SYMPTOMS
NAUSEA: 0
ABDOMINAL PAIN: 0
SORE THROAT: 0
SHORTNESS OF BREATH: 0
CHILLS: 0
FEVER: 0
NECK PAIN: 0
PSYCHIATRIC NEGATIVE: 1
COUGH: 0
VOMITING: 0
PHOTOPHOBIA: 0
DIZZINESS: 0
PALPITATIONS: 0
FLANK PAIN: 0
BACK PAIN: 0
HEMATOLOGIC/LYMPHATIC NEGATIVE: 1
NECK STIFFNESS: 0
WEAKNESS: 0

## 2025-01-13 ASSESSMENT — PAIN SCALES - GENERAL
PAINLEVEL_OUTOF10: 0 - NO PAIN
PAINLEVEL_OUTOF10: 5 - MODERATE PAIN
PAINLEVEL_OUTOF10: 0 - NO PAIN

## 2025-01-13 ASSESSMENT — COLUMBIA-SUICIDE SEVERITY RATING SCALE - C-SSRS
2. HAVE YOU ACTUALLY HAD ANY THOUGHTS OF KILLING YOURSELF?: NO
6. HAVE YOU EVER DONE ANYTHING, STARTED TO DO ANYTHING, OR PREPARED TO DO ANYTHING TO END YOUR LIFE?: NO
1. IN THE PAST MONTH, HAVE YOU WISHED YOU WERE DEAD OR WISHED YOU COULD GO TO SLEEP AND NOT WAKE UP?: NO

## 2025-01-13 ASSESSMENT — PAIN - FUNCTIONAL ASSESSMENT: PAIN_FUNCTIONAL_ASSESSMENT: 0-10

## 2025-01-13 NOTE — PROGRESS NOTES
Cardiac Rehabilitation {[facility] CR/NC/VR NOTE TYPE:65932}    Name: Gael Burnette  Medical Record Number: [id number]  YOB: 1950  Age: 74 y.o.    Today’s Date: 1/13/2025  Primary Care Physician: Kirill Rivera MD  Referring Physician: No ref. provider found  Program Location: [address]  Primary Diagnosis: No diagnosis found.   Onset/Date of Diagnosis: ***    {[facility] CR/NC/VR Session Number:13489}    AACVPR Risk Stratification:       Falls Risk: {[facility] CR/NC/VR FALLS RISK:09841}  Psychosocial Assessment     No data recorded    Sent PH-Q 9 to MD if score > 20: {[facility] CR/NC/VR PHQ-9 SENT TO MD:89191}    Pt reported/currently experiencing stress: {[facility] CR/NC/VR Reported Stress:99573}  Patient uses stress management skills: {YES/NO:200010}  History of: {Hx Anxiety/Depression:04226}  Currently seeing a mental health provider: {Yes/No:32802}  Social Support: {Yes/No:87058}  Quality of Life Survey: {[facility] CR/NC/VR QOLS:03333}  Learning Assessment:  Learning assessment/barriers: {Assessment/barriers:52931}  Preferred learning method: {Preferred learning method:79039}  Barriers: {Barriers to Education:16501}  Comments:    Stages of Change:{Stages of change:98255}    Psychosocial Plan    Goal Status: {[facility] CR/NC/VR Goal Status:67050}    Psychosocial Goals: {[facility] CR/NC/VR PSYCHOSOCIAL GOALS:25087}    Psychosocial Interventions/Education: {[facility] CR/NC/VR To be done in Cardiac Rehab:79234}    {UH AMB CR/NC/VR Psychosocial Initial Reassess Discharge:09131}    Nutrition Assessment:    Hyperlipidemia: {YES/NO:200010}    Lipids:   Lab Results   Component Value Date    CHOL 149 09/09/2024    HDL 30.0 09/09/2024    TRIG 301 (H) 09/09/2024       Current Dietary Guidelines: {Dietary Guidelines:39915:x}  Barriers to dietary change: {response; yes (wildcard)/no:628099}    Diet Habit Survey: Picture Your Plate  Pre: {[facility] CR/NC/VR PYP Initial:54283}  Post: {[facility] CR/NC/VR PYP Post:73972}    Diabetes Assessment    Lab Results    Component Value Date    HGBA1C 7.0 (H) 01/07/2025       History of Diabetes: { CR/UT/VR DM ASSESSMENT:74661}    Weight Management       No data recorded    Nutrition Plan    Goal Status: { CR/UT/VR Goal Status:00897}    Nutrition Goals: { CR/UT/VR Nutrition Goals:30051}    Nutrition Interventions/Education:   { CR/UT/VR To be done in Cardiac Rehab:66854}    { AMB CR/UT/VR Nutrition Initial Reassess Discharge:18811}    Exercise Assessment    { CR/UT/VR Current Home Exercise:47196}    Exercise Prescription     Exercise Prescription based on: { CR/UT/VR ExRx Evaluation:04854}   Frequency:  { CR/UT/VR Session Frequency:88721} days/week   Mode: {Mode:23245}   Duration: *** total aerobic minutes   Intensity: RPE ***  Target HR:  { CR/UT/VR Target HR:93506}  MET Level: ***  Patient wears supplemental O2: { CR/UT/VR Supplemental O2:90880}     Modality Workload METs Duration (minutes)   1 Pre-Exercise      2 { CR/UT/VR Exercise Modality:96437} ***  *** *** :00   3 { CR/UT/VR Exercise Modality:77175} ***  *** *** :00   4 { CR/UT/VR Exercise Modality:17073} ***  *** *** :00   5 { CR/UT/VR Exercise Modality:47102} ***  *** *** :00   6 Post-Exercise        Resistance Training: {YES/NO:678791}  Home Exercise Prescription given: { CR/UT/VR Home Ex Rx:09118}    Exercise Plan    Goal Status: { CR/UT/VR Goal Status:76198}    Exercise Goals: { CR/UT/VR Exercise Goals:20890}    Exercise Interventions/Education:   { CR/UT/VR To be done in Cardiac Rehab:16289}    { AMB CR/UT/VR Exercise Initial Reassess Discharge:14032}    Other Core Components/Risk Factor Assessment:    Medication adherence  Current Medications:   Medication Documentation Review Audit       Reviewed by Yumi Rodarte (Technician) on 01/13/25 at 1637      Medication Order Taking? Sig Documenting Provider Last Dose Status   ascorbic acid (Vitamin C) 1,000 mg tablet [id number] Yes Take 1 tablet (1,000 mg) by mouth once daily. Historical  Provider, MD 1/12/2025 Evening Active   aspirin 81 mg EC tablet [id number] Yes Take 1 tablet (81 mg) by mouth once daily. Elliott Provider, MD 1/13/2025 Active   atorvastatin (Lipitor) 40 mg tablet [id number] Yes Take 1 tablet (40 mg) by mouth once daily at bedtime. LAWRENCE Oakes-CNP 1/12/2025 Evening Active   b complex 0.4 mg tablet [id number] Yes Take 1 tablet by mouth once daily. Elliott Provider, MD 1/12/2025 Evening Active   Cinnamon 500 mg capsule [id number] Yes Take 2 capsules (1,000 mg) by mouth 2 times a day. Elliott Provider, MD 1/13/2025 Morning Active   empagliflozin (Jardiance) 25 mg [id number] Yes Take 1 tablet (25 mg) by mouth once daily. Historical Provider, MD 1/13/2025 Morning Active   glipiZIDE (Glucotrol) 10 mg tablet [id number] Yes Take 1 tablet (10 mg) by mouth 2 times a day before meals. Historical Provider, MD 1/13/2025 Morning Active   glucosamine-chondroitin (Osteo Bi-Flex) 250-200 mg tablet [id number] Yes Take 1 tablet by mouth once daily in the morning. Historical Provider, MD 1/13/2025 Morning Active   levothyroxine (Synthroid, Levoxyl) 112 mcg tablet [id number] Yes Take 1 tablet (112 mcg) by mouth early in the morning.. Take on an empty stomach at the same time each day, either 30 to 60 minutes prior to breakfast Elliott Provider, MD 1/13/2025 Morning Active   lisinopril 5 mg tablet [id number] Yes Take 1 tablet (5 mg) by mouth once daily. Historical Provider, MD 1/13/2025 Morning Active   metoprolol succinate XL (Toprol-XL) 25 mg 24 hr tablet [id number] Yes Take 1 tablet (25 mg) by mouth once daily. Do not crush or chew. Do not fill before September 12, 2024. LAWRENCE Oakes-CNP 1/12/2025 Evening Active   multivitamin tablet [id number] Yes Take 1 tablet by mouth once daily. Historical Provider, MD 1/12/2025 Evening Active   omega 3-dha-epa-fish oil (Fish OiL) 1,200 (144-216) mg capsule [id number] Yes Take 1 capsule (1,200 mg) by mouth once daily. Historical  Provider, MD 2025 Evening Active   omeprazole (PriLOSEC) 40 mg DR capsule [id number] Yes Take 1 capsule (40 mg) by mouth once daily in the morning. Take before meals. Do not crush or chew. Historical Provider, MD 2025 Morning Active   SITagliptin phos-metformin (Janumet) 50-1,000 mg tablet [id number] Yes Take 1 tablet by mouth 2 times daily (morning and late afternoon). Elliott Provider, MD 2025 Morning Active   tamsulosin (Flomax) 0.4 mg 24 hr capsule [id number] Yes Take 1 capsule (0.4 mg) by mouth once daily. Historical Provider, MD 2025 Evening Active   testosterone cypionate (Depo-Testosterone) 200 mg/mL injection [id number]  Inject 1 mL (200 mg) into the muscle every 14 (fourteen) days. Historical Provider, MD  Active   ticagrelor (Brilinta) 90 mg tablet [id number] Yes Take 1 tablet (90 mg) by mouth 2 times a day. Historical Provider, MD 2025 Morning Active   triamcinolone (Kenalog) 0.1 % cream [id number]  Apply topically once daily as needed for irritation. Historical Provider, MD  Active                                 Medication compliance: { GEN YES NO WILDCARD WILDCARD:91987}   Uses pill box/organizer: {YES/NO:}   Carries medication list: {YES/NO:}    Blood Pressure Management  History of Hypertension: {YES/NO:}  Medication Changes: {YES/NO:}  Resting BP:  There were no vitals taken for this visit.     Heart Failure Management  Hx of Heart Failure: { CR/NH/VR HF Management:69952}    Smoking/Tobacco Assessment  Social History     Tobacco Use   Smoking Status Former    Current packs/day: 0.00    Types: Cigarettes    Quit date: 1981    Years since quittin.0   Smokeless Tobacco Never       Other Core Component Plan    Goal Status: { CR/NH/VR Goal Status:98751}    Other Core Component Goals: { CR/NH/VR Other Goals:50975}    Other Core Component Interventions/Education:   { CR/NH/VR Other Goals:12210}    { AMB CR/NH/VR Other Core Initial Reassess  Discharge:63191}    Individual Patient Goals:    ***  ***    Goal Status: {UH CR/PA/VR Goal Status:61755}    Staff Comments:  ***    Rehab Staff Signature: Elizabeth Christiansen RN

## 2025-01-13 NOTE — ED PROVIDER NOTES
Chief Complaint: chest discomfot    This is a 74-year-old male who complains of a burning sensation and discomfort over his chest today when walking up and down stairs.  He had a similar episode in September and was in the emergency department had elevated cardiac enzymes and subsequently had stents from a cardiac catheterization.  He is on Brilinta aspirin and an a statin.  He denies any cough congestion fever chills no swelling of his legs no abdominal pains no back pains otherwise           Review of Systems   Constitutional:  Negative for chills and fever.   HENT: Negative.  Negative for congestion and sore throat.    Eyes:  Negative for photophobia and visual disturbance.   Respiratory:  Negative for cough and shortness of breath.    Cardiovascular:  Negative for palpitations and leg swelling.        Has a burning sensation across his chest   Gastrointestinal:  Negative for abdominal pain, nausea and vomiting.   Genitourinary:  Negative for enuresis and flank pain.   Musculoskeletal:  Negative for back pain, neck pain and neck stiffness.   Skin:  Negative for rash.   Neurological:  Negative for dizziness, syncope and weakness.   Hematological: Negative.    Psychiatric/Behavioral: Negative.     All other systems reviewed and are negative.       Physical Exam  Vitals reviewed.   Constitutional:       General: He is not in acute distress.     Appearance: He is well-developed. He is not ill-appearing.      Comments: States that his symptoms are completely resolved at this time   HENT:      Head: Normocephalic and atraumatic.   Eyes:      Extraocular Movements: Extraocular movements intact.      Pupils: Pupils are equal, round, and reactive to light.   Cardiovascular:      Rate and Rhythm: Normal rate.      Heart sounds: Normal heart sounds.   Pulmonary:      Effort: Pulmonary effort is normal.      Breath sounds: Normal breath sounds. No wheezing or rales.   Chest:      Chest wall: No mass or deformity.    Abdominal:      General: Bowel sounds are normal.      Palpations: There is no fluid wave.      Tenderness: There is no abdominal tenderness. There is no rebound.   Musculoskeletal:      Cervical back: Normal range of motion and neck supple.      Right lower leg: No tenderness. No edema.      Left lower leg: No tenderness. No edema.      Comments: Neg Homans' sign there is no calf tenderness there is no sign of DVT   Skin:     General: Skin is warm and dry.      Capillary Refill: Capillary refill takes less than 2 seconds.      Coloration: Skin is not pale.      Findings: No erythema.   Neurological:      General: No focal deficit present.      Mental Status: He is alert and oriented to person, place, and time.   Psychiatric:         Mood and Affect: Mood normal.         Behavior: Behavior normal.          Labs Reviewed   CBC WITH AUTO DIFFERENTIAL - Abnormal       Result Value    WBC 8.3      nRBC 0.0      RBC 4.80      Hemoglobin 15.2      Hematocrit 44.8      MCV 93      MCH 31.7      MCHC 33.9      RDW 12.5      Platelets 246      Neutrophils % 56.5      Immature Granulocytes %, Automated 0.4      Lymphocytes % 30.2      Monocytes % 7.4      Eosinophils % 5.0      Basophils % 0.5      Neutrophils Absolute 4.69      Immature Granulocytes Absolute, Automated 0.03      Lymphocytes Absolute 2.50      Monocytes Absolute 0.61      Eosinophils Absolute 0.41 (*)     Basophils Absolute 0.04     COMPREHENSIVE METABOLIC PANEL - Abnormal    Glucose 202 (*)     Sodium 135 (*)     Potassium 4.1      Chloride 102      Bicarbonate 27      Anion Gap 10      Urea Nitrogen 25 (*)     Creatinine 1.18      eGFR 66      Calcium 9.3      Albumin 4.0      Alkaline Phosphatase 90      Total Protein 6.3 (*)     AST 20      Bilirubin, Total 0.5      ALT 19     SERIAL TROPONIN-INITIAL - Normal    Troponin I, High Sensitivity 5      Narrative:     Less than 99th percentile of normal range cutoff-  Female and children under 18 years old  <14 ng/L; Male <21 ng/L: Negative  Repeat testing should be performed if clinically indicated.     Female and children under 18 years old 14-50 ng/L; Male 21-50 ng/L:  Consistent with possible cardiac damage and possible increased clinical   risk. Serial measurements may help to assess extent of myocardial damage.     >50 ng/L: Consistent with cardiac damage, increased clinical risk and  myocardial infarction. Serial measurements may help assess extent of   myocardial damage.      NOTE: Children less than 1 year old may have higher baseline troponin   levels and results should be interpreted in conjunction with the overall   clinical context.     NOTE: Troponin I testing is performed using a different   testing methodology at [facility] than at other   [facility]. Direct result comparisons should only   be made within the same method.   SERIAL TROPONIN, 1 HOUR - Normal    Troponin I, High Sensitivity 7      Narrative:     Less than 99th percentile of normal range cutoff-  Female and children under 18 years old <14 ng/L; Male <21 ng/L: Negative  Repeat testing should be performed if clinically indicated.     Female and children under 18 years old 14-50 ng/L; Male 21-50 ng/L:  Consistent with possible cardiac damage and possible increased clinical   risk. Serial measurements may help to assess extent of myocardial damage.     >50 ng/L: Consistent with cardiac damage, increased clinical risk and  myocardial infarction. Serial measurements may help assess extent of   myocardial damage.      NOTE: Children less than 1 year old may have higher baseline troponin   levels and results should be interpreted in conjunction with the overall   clinical context.     NOTE: Troponin I testing is performed using a different   testing methodology at [facility] than at other   [facility]. Direct result comparisons should only   be made within the same method.   TROPONIN SERIES- (INITIAL, 1 HR)     Narrative:     The following orders were created for panel order Troponin I Series, High Sensitivity (0, 1 HR).  Procedure                               Abnormality         Status                     ---------                               -----------         ------                     Troponin I, High Sensiti...[[id number]]  Normal              Final result               Troponin, High Sensitivi...[[id number]]  Normal              Final result                 Please view results for these tests on the individual orders.        XR chest 1 view   Final Result   No active disease in the chest identified.        MACRO:   None        Signed by: Venancio Montiel 1/13/2025 4:01 PM   Dictation workstation:   [id number]           Procedures     Medical Decision Making  Japanese diagnosis included angina exertional angina exertional dyspnea reflux esophagitis.  Routine cardiac workup was ordered at this time including IV Hep-Lock cardiac monitoring pulse oximeter readings treatment IV Hep-Lock cardiac monitoring pulse oximeter readings troponins both 1 and 0 hours were negative his metabolic panel shows slightly elevated blood sugar white blood count was normal chest x-ray was clear.  He received aspirin and a nitro paste had no additional discomfort while in the emergency department Dr. Alva his cardiologist was contacted felt since he was completely resolved he could be discharged to follow-up in the office this week and decide whether he will need a stress test or another catheterization in.  He is to avoid any strenuous activity this week    Amount and/or Complexity of Data Reviewed  ECG/medicine tests: independent interpretation performed.     Details: Twelve-lead EKG showed sinus rhythm at 76/min there is no acute ST segment elevation depressions or arrhythmia         Diagnoses as of 01/13/25 1736   Acute chest pain   Exertional angina (CMS-[facility])                    Ever Kaye MD  01/13/25 1736       Ever Kaye  MD  01/13/25 1883

## 2025-01-14 ENCOUNTER — OFFICE VISIT (OUTPATIENT)
Dept: CARDIOLOGY | Facility: CLINIC | Age: 75
End: 2025-01-14
Payer: MEDICARE

## 2025-01-14 VITALS
DIASTOLIC BLOOD PRESSURE: 56 MMHG | HEART RATE: 72 BPM | BODY MASS INDEX: 27.77 KG/M2 | OXYGEN SATURATION: 97 % | WEIGHT: 194 LBS | SYSTOLIC BLOOD PRESSURE: 104 MMHG | HEIGHT: 70 IN

## 2025-01-14 DIAGNOSIS — I25.10 CORONARY ARTERY DISEASE INVOLVING NATIVE CORONARY ARTERY OF NATIVE HEART, UNSPECIFIED WHETHER ANGINA PRESENT: ICD-10-CM

## 2025-01-14 DIAGNOSIS — Z95.5 HX OF HEART ARTERY STENT: Primary | ICD-10-CM

## 2025-01-14 DIAGNOSIS — I21.4 NON-ST ELEVATION (NSTEMI) MYOCARDIAL INFARCTION (MULTI): ICD-10-CM

## 2025-01-14 DIAGNOSIS — I10 HYPERTENSION, UNSPECIFIED TYPE: ICD-10-CM

## 2025-01-14 DIAGNOSIS — E11.9 DIABETES MELLITUS TYPE II, NON INSULIN DEPENDENT (MULTI): ICD-10-CM

## 2025-01-14 DIAGNOSIS — I25.10 ATHEROSCLEROSIS OF NATIVE CORONARY ARTERY OF NATIVE HEART WITHOUT ANGINA PECTORIS: ICD-10-CM

## 2025-01-14 DIAGNOSIS — E78.5 HYPERLIPIDEMIA, UNSPECIFIED HYPERLIPIDEMIA TYPE: ICD-10-CM

## 2025-01-14 DIAGNOSIS — E03.9 HYPOTHYROIDISM, UNSPECIFIED TYPE: ICD-10-CM

## 2025-01-14 LAB
ATRIAL RATE: 65 BPM
ATRIAL RATE: 76 BPM
P AXIS: 70 DEGREES
P AXIS: 81 DEGREES
P OFFSET: 179 MS
P OFFSET: 189 MS
P ONSET: 124 MS
P ONSET: 128 MS
PR INTERVAL: 190 MS
PR INTERVAL: 200 MS
Q ONSET: 223 MS
Q ONSET: 224 MS
QRS COUNT: 11 BEATS
QRS COUNT: 13 BEATS
QRS DURATION: 112 MS
QRS DURATION: 116 MS
QT INTERVAL: 390 MS
QT INTERVAL: 416 MS
QTC CALCULATION(BAZETT): 432 MS
QTC CALCULATION(BAZETT): 438 MS
QTC FREDERICIA: 421 MS
QTC FREDERICIA: 427 MS
R AXIS: 15 DEGREES
R AXIS: 30 DEGREES
T AXIS: 47 DEGREES
T AXIS: 63 DEGREES
T OFFSET: 418 MS
T OFFSET: 432 MS
VENTRICULAR RATE: 65 BPM
VENTRICULAR RATE: 76 BPM

## 2025-01-14 PROCEDURE — 93000 ELECTROCARDIOGRAM COMPLETE: CPT | Performed by: STUDENT IN AN ORGANIZED HEALTH CARE EDUCATION/TRAINING PROGRAM

## 2025-01-14 PROCEDURE — 4010F ACE/ARB THERAPY RXD/TAKEN: CPT | Performed by: STUDENT IN AN ORGANIZED HEALTH CARE EDUCATION/TRAINING PROGRAM

## 2025-01-14 PROCEDURE — 1159F MED LIST DOCD IN RCRD: CPT | Performed by: STUDENT IN AN ORGANIZED HEALTH CARE EDUCATION/TRAINING PROGRAM

## 2025-01-14 PROCEDURE — 3074F SYST BP LT 130 MM HG: CPT | Performed by: STUDENT IN AN ORGANIZED HEALTH CARE EDUCATION/TRAINING PROGRAM

## 2025-01-14 PROCEDURE — 3008F BODY MASS INDEX DOCD: CPT | Performed by: STUDENT IN AN ORGANIZED HEALTH CARE EDUCATION/TRAINING PROGRAM

## 2025-01-14 PROCEDURE — 99214 OFFICE O/P EST MOD 30 MIN: CPT | Performed by: STUDENT IN AN ORGANIZED HEALTH CARE EDUCATION/TRAINING PROGRAM

## 2025-01-14 PROCEDURE — 3078F DIAST BP <80 MM HG: CPT | Performed by: STUDENT IN AN ORGANIZED HEALTH CARE EDUCATION/TRAINING PROGRAM

## 2025-01-14 PROCEDURE — 1036F TOBACCO NON-USER: CPT | Performed by: STUDENT IN AN ORGANIZED HEALTH CARE EDUCATION/TRAINING PROGRAM

## 2025-01-14 PROCEDURE — 1160F RVW MEDS BY RX/DR IN RCRD: CPT | Performed by: STUDENT IN AN ORGANIZED HEALTH CARE EDUCATION/TRAINING PROGRAM

## 2025-01-14 RX ORDER — METOPROLOL SUCCINATE 25 MG/1
50 TABLET, EXTENDED RELEASE ORAL DAILY
Qty: 60 TABLET | Refills: 0 | Status: ON HOLD | OUTPATIENT
Start: 2025-01-14 | End: 2025-02-13

## 2025-01-14 NOTE — PROGRESS NOTES
Chief Complaint   Patient presents with    Follow-up     HPI:  I was requested to evaluate this patient in consultation for cardiac assessment.    Mr. Gael Burnette is a 74 y.o. former smoker diabetic male with prior medical history significant for CAD S/P recent PCI to LAD (and residual lesion to OM), hypertension, diabetes mellitus, hyperlipidemia, hypothyroidism. He presented to ED [facility] on 09/14/2024 complaining of chest pain. Patient states that he had left sided chest pain last week and underwent successful PCI to LAD, with remaining lesion to OM that was scheduled for treatment as outpatient. However, due to new onset chest pain, we have opted to admit the patient and to schedule the PCI to the remaining lesion as inpatient. Initial troponin was 60. Labwork otherwise unremarkable. EKG showed sinus rhythm with incomplete RBBB and non-specific ST-T changes. He was given nitroglycerin with improvement in his pain. Patient was admitted for clinical compensation. Patient underwent successful PCI to LAD / Ramus / 1st OM. He was discharged uneventfully.     Patient returns today stating that he is experiencing chest pressure similar to the one that he had before the stent. The pain gets worse on exertion, especially lifting weights with his left arm, and improves by resting. He even went to ER due to this pain. He denies shortness of breath, palpitations, leg edema, lightheadedness, headaches, fever, chills, orthopnea, paroxysmal nocturnal dyspnea or syncope. Notably, he has been missing many times his morning medications (forgot 12 times).     Past Medical History  Past Medical History:   Diagnosis Date    Coronary artery disease     Diabetes mellitus (Multi)     Disease of thyroid gland     Hyperlipidemia     Hypertension        Past Surgical History  Past Surgical History:   Procedure Laterality Date    CARDIAC CATHETERIZATION N/A 9/10/2024    Procedure: Left Heart Cath, With LV;  Surgeon: Tyshawn Pandya  Mirella Dixon MD;  Location: [facility] Cardiac Cath Lab;  Service: Cardiovascular;  Laterality: N/A;    CARDIAC CATHETERIZATION N/A 9/10/2024    Procedure: PCI XOCHILT Stent- Coronary;  Surgeon: Tyshawn Dixon MD;  Location: [facility] Cardiac Cath Lab;  Service: Cardiovascular;  Laterality: N/A;    CARDIAC CATHETERIZATION N/A 9/10/2024    Procedure: IVUS - Coronary;  Surgeon: Tyshawn Dixon MD;  Location: [facility] Cardiac Cath Lab;  Service: Cardiovascular;  Laterality: N/A;    CARDIAC CATHETERIZATION N/A 2024    Procedure: PCI XOCHILT Stent- Coronary;  Surgeon: Tyshawn Dixon MD;  Location: [facility] Cardiac Cath Lab;  Service: Cardiovascular;  Laterality: N/A;       Past Family History  Family History   Problem Relation Name Age of Onset    Heart disease Father      Heart attack Brother      Heart disease Other brother        Allergy History  No Known Allergies    Past Social History  Social History     Socioeconomic History    Marital status:    Tobacco Use    Smoking status: Former     Current packs/day: 0.00     Types: Cigarettes     Quit date:      Years since quittin.0    Smokeless tobacco: Never   Substance and Sexual Activity    Alcohol use: Never    Drug use: Never     Social Drivers of Health     Financial Resource Strain: Low Risk  (2024)    Received from [facility]    Overall Financial Resource Strain (CARDIA)     Difficulty of Paying Living Expenses: Not very hard   Food Insecurity: No Food Insecurity (2024)    Received from [facility]    Hunger Vital Sign     Worried About Running Out of Food in the Last Year: Never true     Ran Out of Food in the Last Year: Never true   Transportation Needs: No Transportation Needs (2024)    Received from [facility]    PRAPARE - Transportation     Lack of Transportation (Medical): No     Lack of Transportation (Non-Medical): No   Physical Activity: Sufficiently Active (2024)    Received from  [facility]    Exercise Vital Sign     Days of Exercise per Week: 3 days     Minutes of Exercise per Session: 60 min   Stress: No Stress Concern Present (2024)    Received from [facility]    [place] [employer] - Occupational Stress Questionnaire     Feeling of Stress : Not at all   Social Connections: Socially Integrated (2024)    Received from [facility]    Social Connection and Isolation Panel [NHANES]     Frequency of Communication with Friends and Family: Once a week     Frequency of Social Gatherings with Friends and Family: Twice a week     Attends [employer] Services: More than 4 times per year     Active Member of Clubs or Organizations: Yes     Attends Club or Organization Meetings: More than 4 times per year     Marital Status:    Housing Stability: Low Risk  (2024)    Housing Stability Vital Sign     Unable to Pay for Housing in the Last Year: No     Number of Times Moved in the Last Year: 0     Homeless in the Last Year: No       Social History     Tobacco Use   Smoking Status Former    Current packs/day: 0.00    Types: Cigarettes    Quit date:     Years since quittin.0   Smokeless Tobacco Never     Review of Systems:  Constitutional: Denies any fever or chills  Eyes: Denies any eye pain or blurry vision  ENT: Denies any ear pain or hearing loss  Cardiovascular: The heart rate is not slow, the heart rate is not fast  Respiratory: Denies any asthma/wheezing  Gastrointestinal: Denies any tasha colored stools or fatty food intolerance  Genitourinary: Denies any blood in the urine or pelvic pain  Musculoskeletal: Denies any swelling in the joints or difficulty walking  Skin: Denies any skin lumps or skin lesions  Neurological: Denies any dizziness/tingling      Objective Data:  Last Recorded Vitals:  There were no vitals filed for this visit.    Last Labs:  CBC - 2025:  3:41 PM  8.3 15.2 246    44.8      CMP - 2025:  3:41 PM  9.3  6.3 20 --- 0.5   _ 4.0 19 90      PTT - 9/9/2024: 11:06 AM  0.9   10.7 31     TROPHS   Date/Time Value Ref Range Status   01/13/2025 04:40 PM 7 0 - 20 ng/L Final   01/13/2025 03:41 PM 5 0 - 20 ng/L Final   09/15/2024 04:44 AM 59 0 - 20 ng/L Final     Comment:     Previous result verified on 9/14/2024 1806 on specimen/case [id number] called with component [facility] for procedure Troponin I, High Sensitivity, Initial with value 60 ng/L.     BNP   Date/Time Value Ref Range Status   09/09/2024 11:07 AM 54 0 - 99 pg/mL Final     HGBA1C   Date/Time Value Ref Range Status   01/07/2025 10:21 AM 7.0 4.3 - 5.6 % Final     Comment:     American Diabetes Association guidelines indicate that patients with HgbA1c in the range 5.7-6.4% are at increased risk for development of diabetes, and intervention by lifestyle modification may be beneficial. HgbA1c greater or equal to 6.5% is considered diagnostic of diabetes.   09/25/2024 01:28 PM 7.7 4.3 - 5.6 % Final     Comment:     American Diabetes Association guidelines indicate that patients with HgbA1c in the range 5.7-6.4% are at increased risk for development of diabetes, and intervention by lifestyle modification may be beneficial. HgbA1c greater or equal to 6.5% is considered diagnostic of diabetes.     LDLCALC   Date/Time Value Ref Range Status   09/09/2024 02:50 PM 59 <=99 mg/dL Final     Comment:                                 Near   Borderline      AGE      Desirable  Optimal    High     High     Very High     0-19 Y     0 - 109     ---    110-129   >/= 130     ----    20-24 Y     0 - 119     ---    120-159   >/= 160     ----      >24 Y     0 -  99   100-129  130-159   160-189     >/=190       VLDL   Date/Time Value Ref Range Status   09/09/2024 02:50 PM 60 0 - 40 mg/dL Final        Patient Medications:  Outpatient Encounter Medications as of 1/14/2025   Medication Sig Dispense Refill    ascorbic acid (Vitamin C) 1,000 mg tablet Take 1 tablet (1,000 mg) by mouth once daily.       aspirin 81 mg EC tablet Take 1 tablet (81 mg) by mouth once daily.      atorvastatin (Lipitor) 40 mg tablet Take 1 tablet (40 mg) by mouth once daily at bedtime. 30 tablet 0    b complex 0.4 mg tablet Take 1 tablet by mouth once daily.      Cinnamon 500 mg capsule Take 2 capsules (1,000 mg) by mouth 2 times a day.      empagliflozin (Jardiance) 25 mg Take 1 tablet (25 mg) by mouth once daily.      glipiZIDE (Glucotrol) 10 mg tablet Take 1 tablet (10 mg) by mouth 2 times a day before meals.      glucosamine-chondroitin (Osteo Bi-Flex) 250-200 mg tablet Take 1 tablet by mouth once daily in the morning.      levothyroxine (Synthroid, Levoxyl) 112 mcg tablet Take 1 tablet (112 mcg) by mouth early in the morning.. Take on an empty stomach at the same time each day, either 30 to 60 minutes prior to breakfast      lisinopril 5 mg tablet Take 1 tablet (5 mg) by mouth once daily.      metoprolol succinate XL (Toprol-XL) 25 mg 24 hr tablet Take 1 tablet (25 mg) by mouth once daily. Do not crush or chew. Do not fill before September 12, 2024. 30 tablet 0    multivitamin tablet Take 1 tablet by mouth once daily.      omega 3-dha-epa-fish oil (Fish OiL) 1,200 (144-216) mg capsule Take 1 capsule (1,200 mg) by mouth once daily.      omeprazole (PriLOSEC) 40 mg DR capsule Take 1 capsule (40 mg) by mouth once daily in the morning. Take before meals. Do not crush or chew.      SITagliptin phos-metformin (Janumet) 50-1,000 mg tablet Take 1 tablet by mouth 2 times daily (morning and late afternoon).      tamsulosin (Flomax) 0.4 mg 24 hr capsule Take 1 capsule (0.4 mg) by mouth once daily.      testosterone cypionate (Depo-Testosterone) 200 mg/mL injection Inject 1 mL (200 mg) into the muscle every 14 (fourteen) days.      ticagrelor (Brilinta) 90 mg tablet Take 1 tablet (90 mg) by mouth 2 times a day.      triamcinolone (Kenalog) 0.1 % cream Apply topically once daily as needed for irritation.       No facility-administered  encounter medications on file as of 1/14/2025.       Physical Exam:  General: alert, oriented and in no acute distress  HEENT: NC/AT; EOMI; PERRLA, external ear is normal  Neck: supple; trachea midline; no masses; no JVD  Chest: clear breath sounds bilaterally; no wheezing  Cardio: regular rhythm, S1S2 normal, no murmurs  Abdomen: Soft, non-tender, non-distension, no organomegaly  Extremities: no clubbing/cyanosis/edema. Good healing R radial access site  Neuro: Grossly intact     Psychiatric: Normal mood and affect    Past Cardiology Results (Last 3 Years):  EKG:  ECG 12 lead 01/13/2025 (Preliminary)      ECG 12 lead 01/13/2025 (Preliminary)      ECG 12 lead (Ancillary Performed) 01/08/2025      Electrocardiogram 12 Lead 09/16/2024      ECG 12 lead 09/14/2024      ECG 12 Lead 09/10/2024      ECG 12 lead 09/09/2024 (Wet Read)      ECG 12 lead 09/09/2024    Echo:  Echo Results:  Transthoracic Echo (TTE) Complete 09/10/2024    [place] [address]  Phone [phone], Fax [phone]    TRANSTHORACIC ECHOCARDIOGRAM REPORT    Patient Name:      TROY Jacques Physician:    [id number] Ar Allen MD  Study Date:        9/10/2024             Ordering Provider:    [id number] SURI ONEILL  MRN/PID:           [id number]              Fellow:  Accession#:        [id number]          Nurse:  Date of Birth/Age: 1950 / 74 years   Sonographer:          Octaviano Galvez GISELA  Gender:            M                     Additional Staff:  Height:            180.34 cm             Admit Date:  Weight:            87.09 kg              Admission Status:     Inpatient -  Routine  BSA / BMI:         2.07 m2 / 26.78 kg/m2 Department Location:  [address]  Blood Pressure: 99 /63 mmHg    Study Type:    TRANSTHORACIC ECHO (TTE) COMPLETE  Diagnosis/ICD: Other chest pain-R07.89  CPT Codes:     Echo Complete w Full Doppler-[id number]  Study Detail: The following  Echo studies were performed: 2D, M-Mode, color flow  and Doppler.      PHYSICIAN INTERPRETATION:  Left Ventricle: The left ventricular systolic function is normal, with a Basilio's biplane calculated ejection fraction of 60%. There are no regional wall motion abnormalities. The left ventricular cavity size is normal. Spectral Doppler shows a normal pattern of left ventricular diastolic filling.  Left Atrium: The left atrium is normal in size.  Right Ventricle: The right ventricle is normal in size. There is normal right ventricular global systolic function.  Right Atrium: The right atrium is normal in size.  Aortic Valve: The aortic valve is trileaflet. The aortic valve dimensionless index is 0.95. There is no evidence of aortic valve regurgitation. The peak instantaneous gradient of the aortic valve is 6.5 mmHg. The mean gradient of the aortic valve is 4.0 mmHg.  Mitral Valve: The mitral valve is normal in structure. There is no evidence of mitral valve regurgitation.  Tricuspid Valve: The tricuspid valve is structurally normal. No evidence of tricuspid regurgitation.  Pulmonic Valve: The pulmonic valve is not well visualized. There is no indication of pulmonic valve regurgitation.  Pericardium: There is no pericardial effusion noted.  Aorta: The aortic root is normal.  Systemic Veins: The inferior vena cava appears to be of normal size, IVC inspiratory collapse greater than 50%.      CONCLUSIONS:  1. The left ventricular systolic function is normal, with a Basilio's biplane calculated ejection fraction of 60%.  2. There is normal right ventricular global systolic function.    QUANTITATIVE DATA SUMMARY:    2D MEASUREMENTS:           Normal Ranges:  Ao Root d:       3.20 cm   (2.0-3.7cm)  LAs:             3.20 cm   (2.7-4.0cm)  IVSd:            1.02 cm   (0.6-1.1cm)  LVPWd:           0.84 cm   (0.6-1.1cm)  LVIDd:           4.64 cm   (3.9-5.9cm)  LVIDs:           2.88 cm  LV Mass Index:   70.5 g/m2  LV % FS           37.9 %      LA VOLUME:                   Normal Ranges:  LA Vol A4C:        13.8 ml   (22+/-6mL/m2)  LA Vol A2C:        14.0 ml  LA Vol BP:         14.5 ml  LA Vol Index A4C:  6.7ml/m2  LA Vol Index A2C:  6.8 ml/m2  LA Vol Index BP:   7.0 ml/m2  LA Area A4C:       8.2 cm2  LA Area A2C:       7.9 cm2  LA Major Axis A4C: 4.2 cm  LA Major Axis A2C: 3.8 cm  LA Volume Index:   6.6 ml/m2  LA Vol A4C:        13.6 ml  LA Vol A2C:        14.0 ml  LA Vol Index BSA:  6.7 ml/m2      LV SYSTOLIC FUNCTION BY 2D PLANIMETRY (MOD):  Normal Ranges:  EF-A4C View:    55 % (>=55%)  EF-A2C View:    65 %  EF-Biplane:     60 %  LV EF Reported: 60 %      LV DIASTOLIC FUNCTION:           Normal Ranges:  MV Peak E:             0.56 m/s  (0.7-1.2 m/s)  MV Peak A:             0.74 m/s  (0.42-0.7 m/s)  E/A Ratio:             0.76      (1.0-2.2)  MV e'                  0.108 m/s (>8.0)  MV lateral e'          0.12 m/s  MV medial e'           0.09 m/s  E/e' Ratio:            5.23      (<8.0)      MITRAL VALVE:          Normal Ranges:  MV DT:        243 msec (150-240msec)      AORTIC VALVE:                     Normal Ranges:  AoV Vmax:                1.27 m/s (<=1.7m/s)  AoV Peak P.5 mmHg (<20mmHg)  AoV Mean P.0 mmHg (1.7-11.5mmHg)  LVOT Max Daniel:            1.17 m/s (<=1.1m/s)  AoV VTI:                 25.60 cm (18-25cm)  LVOT VTI:                24.20 cm  LVOT Diameter:           2.00 cm  (1.8-2.4cm)  AoV Area, VTI:           2.97 cm2 (2.5-5.5cm2)  AoV Area,Vmax:           2.89 cm2 (2.5-4.5cm2)  AoV Dimensionless Index: 0.95      RIGHT VENTRICLE:  RV Basal 4.34 cm  RV Mid   3.30 cm  RV Major 7.6 cm  TAPSE:   21.2 mm  RV s'    0.13 m/s      [id number] Ar Allen MD  Electronically signed on 9/10/2024 at 10:36:53 AM        ** Final **     Cath:  Cardiac Catheterization Procedure 2024      Cardiac Catheterization Procedure 09/10/2024    CV NCDR CATHPCI V5 COLLECTION FORM   Stress Test:  No results found for this  or any previous visit from the past 1095 days.    Cardiac Imaging:  No results found for this or any previous visit from the past 1095 days.       Assessment/Plan     Mr. Gael Burnette is a 74 y.o. former smoker diabetic male with prior medical history significant for CAD S/P recent PCI to LAD (and residual lesion to OM), hypertension, diabetes mellitus, hyperlipidemia, hypothyroidism. He presented to ED [facility] on 09/14/2024 complaining of chest pain. Patient states that he had left sided chest pain last week and underwent successful PCI to LAD, with remaining lesion to OM that was scheduled for treatment as outpatient. However, due to new onset chest pain, we have opted to admit the patient and to schedule the PCI to the remaining lesion as inpatient. Initial troponin was 60. Labwork otherwise unremarkable. EKG showed sinus rhythm with incomplete RBBB and non-specific ST-T changes. He was given nitroglycerin with improvement in his pain. Patient was admitted for clinical compensation. Patient underwent successful PCI to LAD / Ramus / 1st OM. He was discharged uneventfully.     Assessment     # Unstable angina / CAD S/P PCI to LAD / Ramus and 1st OM  - Troponin 60.   - EKG showed  sinus rhythm with incomplete RBBB and non-specific ST-T changes.  - We had a thorough conversation with the patient about the natural history of atherosclerosis and the importance for commitment with healthy lifestyle, including but not limited to healthy diet, regular exercises, avoid sedentary and compliance to medication.   - Left Heart Catheterization (09/10/2024):  Double vessel Coronary Artery Disease  Mid LAD 95% calcified lesion - bifurcation with 1st Dg (70% ostial lesion) and 2nd Dg (50% ostial lesion)   Mid 1st OM 90% calcified lesion  RCA with irregularities  Preserved LV systolic function  S/P Successful PCI to mid LAD (bifurcation with 1st and 2nd Dg) using one XOCHILT, guided by IVUS  - Left Heart Catheterization  (09/10/2024):  Patent stent to mid LAD  Mid 1st OM 90% calcified lesion  Prox-mid Ramus 95% diffuse lesion  S/P Successful PCI to prox-mid ramus using one XOCHILT, guided by IVUS  S/P Successful PCI to mid 1st OM (bifurcation lesion) using one XOCHILT, guided by IVUS  - Cardiac rehab.  - Patient returns today stating that he is experiencing chest pressure similar to the one that he had before the stent. The pain gets worse on exertion, especially lifting weights with his left arm, and improves by resting. He even went to ER due to this pain. Notably, he has been missing many times his morning medications (forgot 12 times).  - Keep ASA; Ticagrelor; Atorvastatin 40mg daily; Metoprolol succinate 25mg daily; Lisinopril 5mg daily.  - Will increase Metoprolol succinate to 50mg daily.  - Will request new echocardiogram and new stress test for assessment of ischemia.  - Follow up after tests. Patient aware that he may need to repeat Left Heart Catheterization.     # Hypertension  - Controlled blood pressure.  - Keep current medications including lisinopril 5mg daily.  - Patient counseled to keep a healthy lifestyle including regular exercise and low-sodium diet.  - Recommended home blood pressure monitoring.  - Goal of BP < 130/80mmHg.     # Diabetes  - Followed by PCP.  - Counseled on healthy diet and regular exercises.  - Discussed need for weight loss and the benefits.   - Keep current medications including empaglifozin, glipizide.     # Hyperlipidemia  - Followed by PCP.   - Keep home medication with Atorvastatin 40mg daily.  - Counseled on healthy diet and regular exercise.      # Hypothyroidism  - Keep Levothyroxine 112mcg daily      We have discussed the most common side effects of the prescribed medications, indications, drug interactions, risks, complications, and alternatives of medications/therapeutics were explained and discussed. The patient has been requested to monitor closely for any untoward side effects or  complications of medications. The patient has been strongly advised to be compliant with the recommendations, all the questions and concerns have been addressed. The patient has been also instructed to call, to return sooner or to go to the emergency department if symptoms persist or get worsen. The patient voiced understanding and denies any further questions at this time.       This note was transcribed using the Dragon Dictation system. There may be grammatical, punctuation, or verbiage errors that occur with voice recognition programs.     Counseling greater than 50% of visit regarding all cardiac issues.     Thank you for allowing me to participate in the care of this patient. Please do not hesitate to contact me with any further questions or concerns.     Tyshawn Dixon MD  Cardiology

## 2025-01-15 ENCOUNTER — CLINICAL SUPPORT (OUTPATIENT)
Dept: CARDIAC REHAB | Facility: HOSPITAL | Age: 75
End: 2025-01-15
Payer: MEDICARE

## 2025-01-15 DIAGNOSIS — I25.10 CORONARY ARTERY DISEASE INVOLVING NATIVE CORONARY ARTERY OF NATIVE HEART, UNSPECIFIED WHETHER ANGINA PRESENT: Primary | ICD-10-CM

## 2025-01-15 PROCEDURE — 93798 PHYS/QHP OP CAR RHAB W/ECG: CPT | Performed by: STUDENT IN AN ORGANIZED HEALTH CARE EDUCATION/TRAINING PROGRAM

## 2025-01-17 ENCOUNTER — APPOINTMENT (OUTPATIENT)
Dept: RADIOLOGY | Facility: HOSPITAL | Age: 75
DRG: 321 | End: 2025-01-17
Payer: MEDICARE

## 2025-01-17 ENCOUNTER — APPOINTMENT (OUTPATIENT)
Dept: CARDIAC REHAB | Facility: HOSPITAL | Age: 75
End: 2025-01-17
Payer: MEDICARE

## 2025-01-17 ENCOUNTER — APPOINTMENT (OUTPATIENT)
Dept: CARDIOLOGY | Facility: HOSPITAL | Age: 75
DRG: 321 | End: 2025-01-17
Payer: MEDICARE

## 2025-01-17 ENCOUNTER — HOSPITAL ENCOUNTER (INPATIENT)
Facility: HOSPITAL | Age: 75
DRG: 321 | End: 2025-01-17
Attending: EMERGENCY MEDICINE | Admitting: HOSPITALIST
Payer: MEDICARE

## 2025-01-17 DIAGNOSIS — I25.118 ATHEROSCLEROSIS OF NATIVE CORONARY ARTERY OF NATIVE HEART WITH OTHER FORM OF ANGINA PECTORIS: ICD-10-CM

## 2025-01-17 DIAGNOSIS — I20.0 UNSTABLE ANGINA (MULTI): ICD-10-CM

## 2025-01-17 DIAGNOSIS — I25.10 CORONARY ARTERY DISEASE INVOLVING NATIVE CORONARY ARTERY OF NATIVE HEART, UNSPECIFIED WHETHER ANGINA PRESENT: ICD-10-CM

## 2025-01-17 DIAGNOSIS — I21.4 NSTEMI (NON-ST ELEVATED MYOCARDIAL INFARCTION) (MULTI): Primary | ICD-10-CM

## 2025-01-17 LAB
ALBUMIN SERPL BCP-MCNC: 3.8 G/DL (ref 3.4–5)
ALP SERPL-CCNC: 58 U/L (ref 33–136)
ALT SERPL W P-5'-P-CCNC: 18 U/L (ref 10–52)
ANION GAP SERPL CALC-SCNC: 14 MMOL/L (ref 10–20)
APTT PPP: 30 SECONDS (ref 27–38)
AST SERPL W P-5'-P-CCNC: 18 U/L (ref 9–39)
ATRIAL RATE: 66 BPM
BASOPHILS # BLD AUTO: 0.04 X10*3/UL (ref 0–0.1)
BASOPHILS NFR BLD AUTO: 0.5 %
BILIRUB SERPL-MCNC: 0.6 MG/DL (ref 0–1.2)
BUN SERPL-MCNC: 20 MG/DL (ref 6–23)
CALCIUM SERPL-MCNC: 9 MG/DL (ref 8.6–10.3)
CARDIAC TROPONIN I PNL SERPL HS: 279 NG/L (ref 0–20)
CARDIAC TROPONIN I PNL SERPL HS: 310 NG/L (ref 0–20)
CHLORIDE SERPL-SCNC: 100 MMOL/L (ref 98–107)
CO2 SERPL-SCNC: 24 MMOL/L (ref 21–32)
CREAT SERPL-MCNC: 1.07 MG/DL (ref 0.5–1.3)
EGFRCR SERPLBLD CKD-EPI 2021: 74 ML/MIN/1.73M*2
EOSINOPHIL # BLD AUTO: 0.28 X10*3/UL (ref 0–0.4)
EOSINOPHIL NFR BLD AUTO: 3.5 %
ERYTHROCYTE [DISTWIDTH] IN BLOOD BY AUTOMATED COUNT: 12.3 % (ref 11.5–14.5)
GLUCOSE BLD MANUAL STRIP-MCNC: 198 MG/DL (ref 74–99)
GLUCOSE SERPL-MCNC: 276 MG/DL (ref 74–99)
HCT VFR BLD AUTO: 45.7 % (ref 41–52)
HGB BLD-MCNC: 15.3 G/DL (ref 13.5–17.5)
IMM GRANULOCYTES # BLD AUTO: 0.02 X10*3/UL (ref 0–0.5)
IMM GRANULOCYTES NFR BLD AUTO: 0.3 % (ref 0–0.9)
LYMPHOCYTES # BLD AUTO: 1.9 X10*3/UL (ref 0.8–3)
LYMPHOCYTES NFR BLD AUTO: 23.8 %
MAGNESIUM SERPL-MCNC: 1.96 MG/DL (ref 1.6–2.4)
MCH RBC QN AUTO: 31.2 PG (ref 26–34)
MCHC RBC AUTO-ENTMCNC: 33.5 G/DL (ref 32–36)
MCV RBC AUTO: 93 FL (ref 80–100)
MONOCYTES # BLD AUTO: 0.49 X10*3/UL (ref 0.05–0.8)
MONOCYTES NFR BLD AUTO: 6.1 %
NEUTROPHILS # BLD AUTO: 5.24 X10*3/UL (ref 1.6–5.5)
NEUTROPHILS NFR BLD AUTO: 65.8 %
NRBC BLD-RTO: 0 /100 WBCS (ref 0–0)
P AXIS: 62 DEGREES
P OFFSET: 186 MS
P ONSET: 127 MS
PLATELET # BLD AUTO: 220 X10*3/UL (ref 150–450)
POTASSIUM SERPL-SCNC: 4.6 MMOL/L (ref 3.5–5.3)
PR INTERVAL: 198 MS
PROT SERPL-MCNC: 5.8 G/DL (ref 6.4–8.2)
Q ONSET: 226 MS
QRS COUNT: 11 BEATS
QRS DURATION: 110 MS
QT INTERVAL: 418 MS
QTC CALCULATION(BAZETT): 438 MS
QTC FREDERICIA: 431 MS
R AXIS: -20 DEGREES
RBC # BLD AUTO: 4.9 X10*6/UL (ref 4.5–5.9)
SODIUM SERPL-SCNC: 133 MMOL/L (ref 136–145)
T AXIS: 61 DEGREES
T OFFSET: 435 MS
VENTRICULAR RATE: 66 BPM
WBC # BLD AUTO: 8 X10*3/UL (ref 4.4–11.3)

## 2025-01-17 PROCEDURE — 2500000004 HC RX 250 GENERAL PHARMACY W/ HCPCS (ALT 636 FOR OP/ED): Performed by: EMERGENCY MEDICINE

## 2025-01-17 PROCEDURE — 71045 X-RAY EXAM CHEST 1 VIEW: CPT | Mod: FOREIGN READ | Performed by: RADIOLOGY

## 2025-01-17 PROCEDURE — 2500000004 HC RX 250 GENERAL PHARMACY W/ HCPCS (ALT 636 FOR OP/ED): Performed by: HOSPITALIST

## 2025-01-17 PROCEDURE — 1100000001 HC PRIVATE ROOM DAILY

## 2025-01-17 PROCEDURE — 84484 ASSAY OF TROPONIN QUANT: CPT | Performed by: EMERGENCY MEDICINE

## 2025-01-17 PROCEDURE — 36415 COLL VENOUS BLD VENIPUNCTURE: CPT | Performed by: EMERGENCY MEDICINE

## 2025-01-17 PROCEDURE — 80053 COMPREHEN METABOLIC PANEL: CPT | Performed by: EMERGENCY MEDICINE

## 2025-01-17 PROCEDURE — 96372 THER/PROPH/DIAG INJ SC/IM: CPT | Performed by: EMERGENCY MEDICINE

## 2025-01-17 PROCEDURE — 2500000001 HC RX 250 WO HCPCS SELF ADMINISTERED DRUGS (ALT 637 FOR MEDICARE OP): Performed by: EMERGENCY MEDICINE

## 2025-01-17 PROCEDURE — 93005 ELECTROCARDIOGRAM TRACING: CPT

## 2025-01-17 PROCEDURE — 1200000002 HC GENERAL ROOM WITH TELEMETRY DAILY

## 2025-01-17 PROCEDURE — 83735 ASSAY OF MAGNESIUM: CPT | Performed by: EMERGENCY MEDICINE

## 2025-01-17 PROCEDURE — 99291 CRITICAL CARE FIRST HOUR: CPT | Performed by: EMERGENCY MEDICINE

## 2025-01-17 PROCEDURE — 99223 1ST HOSP IP/OBS HIGH 75: CPT | Performed by: HOSPITALIST

## 2025-01-17 PROCEDURE — 85730 THROMBOPLASTIN TIME PARTIAL: CPT | Performed by: EMERGENCY MEDICINE

## 2025-01-17 PROCEDURE — 2500000005 HC RX 250 GENERAL PHARMACY W/O HCPCS: Performed by: EMERGENCY MEDICINE

## 2025-01-17 PROCEDURE — 85025 COMPLETE CBC W/AUTO DIFF WBC: CPT | Performed by: EMERGENCY MEDICINE

## 2025-01-17 PROCEDURE — 82947 ASSAY GLUCOSE BLOOD QUANT: CPT

## 2025-01-17 PROCEDURE — 71045 X-RAY EXAM CHEST 1 VIEW: CPT

## 2025-01-17 PROCEDURE — 99291 CRITICAL CARE FIRST HOUR: CPT | Performed by: STUDENT IN AN ORGANIZED HEALTH CARE EDUCATION/TRAINING PROGRAM

## 2025-01-17 PROCEDURE — 2500000001 HC RX 250 WO HCPCS SELF ADMINISTERED DRUGS (ALT 637 FOR MEDICARE OP): Performed by: HOSPITALIST

## 2025-01-17 PROCEDURE — 2500000002 HC RX 250 W HCPCS SELF ADMINISTERED DRUGS (ALT 637 FOR MEDICARE OP, ALT 636 FOR OP/ED): Performed by: HOSPITALIST

## 2025-01-17 RX ORDER — ACETAMINOPHEN 650 MG/1
650 SUPPOSITORY RECTAL EVERY 4 HOURS PRN
Status: DISCONTINUED | OUTPATIENT
Start: 2025-01-17 | End: 2025-01-20 | Stop reason: HOSPADM

## 2025-01-17 RX ORDER — INSULIN LISPRO 100 [IU]/ML
0-5 INJECTION, SOLUTION INTRAVENOUS; SUBCUTANEOUS
Status: DISCONTINUED | OUTPATIENT
Start: 2025-01-18 | End: 2025-01-20 | Stop reason: HOSPADM

## 2025-01-17 RX ORDER — DEXTROSE 50 % IN WATER (D50W) INTRAVENOUS SYRINGE
12.5
Status: DISCONTINUED | OUTPATIENT
Start: 2025-01-17 | End: 2025-01-20 | Stop reason: HOSPADM

## 2025-01-17 RX ORDER — ATORVASTATIN CALCIUM 40 MG/1
40 TABLET, FILM COATED ORAL NIGHTLY
Status: DISCONTINUED | OUTPATIENT
Start: 2025-01-17 | End: 2025-01-20 | Stop reason: HOSPADM

## 2025-01-17 RX ORDER — NITROGLYCERIN 0.4 MG/1
0.4 TABLET SUBLINGUAL ONCE
Status: COMPLETED | OUTPATIENT
Start: 2025-01-17 | End: 2025-01-17

## 2025-01-17 RX ORDER — LEVOTHYROXINE SODIUM 112 UG/1
112 TABLET ORAL DAILY
Status: DISCONTINUED | OUTPATIENT
Start: 2025-01-18 | End: 2025-01-20 | Stop reason: HOSPADM

## 2025-01-17 RX ORDER — ENOXAPARIN SODIUM 100 MG/ML
1 INJECTION SUBCUTANEOUS ONCE
Status: COMPLETED | OUTPATIENT
Start: 2025-01-17 | End: 2025-01-17

## 2025-01-17 RX ORDER — DEXTROSE 50 % IN WATER (D50W) INTRAVENOUS SYRINGE
25
Status: DISCONTINUED | OUTPATIENT
Start: 2025-01-17 | End: 2025-01-20 | Stop reason: HOSPADM

## 2025-01-17 RX ORDER — ASPIRIN 81 MG/1
81 TABLET ORAL DAILY
Status: DISCONTINUED | OUTPATIENT
Start: 2025-01-18 | End: 2025-01-20 | Stop reason: HOSPADM

## 2025-01-17 RX ORDER — MORPHINE SULFATE 2 MG/ML
1 INJECTION, SOLUTION INTRAMUSCULAR; INTRAVENOUS EVERY 4 HOURS PRN
Status: DISCONTINUED | OUTPATIENT
Start: 2025-01-17 | End: 2025-01-20 | Stop reason: HOSPADM

## 2025-01-17 RX ORDER — LANCETS 28 GAUGE
1 EACH MISCELLANEOUS AS NEEDED
COMMUNITY
Start: 2024-12-18

## 2025-01-17 RX ORDER — METOPROLOL SUCCINATE 50 MG/1
50 TABLET, EXTENDED RELEASE ORAL DAILY
Status: DISCONTINUED | OUTPATIENT
Start: 2025-01-18 | End: 2025-01-20 | Stop reason: HOSPADM

## 2025-01-17 RX ORDER — ACETAMINOPHEN 325 MG/1
650 TABLET ORAL EVERY 4 HOURS PRN
Status: DISCONTINUED | OUTPATIENT
Start: 2025-01-17 | End: 2025-01-20 | Stop reason: HOSPADM

## 2025-01-17 RX ORDER — HEPARIN SODIUM 10000 [USP'U]/100ML
0-4000 INJECTION, SOLUTION INTRAVENOUS CONTINUOUS
Status: DISCONTINUED | OUTPATIENT
Start: 2025-01-17 | End: 2025-01-17

## 2025-01-17 RX ORDER — BLOOD-GLUCOSE METER
1 KIT MISCELLANEOUS DAILY
COMMUNITY

## 2025-01-17 RX ORDER — POLYETHYLENE GLYCOL 3350 17 G/17G
17 POWDER, FOR SOLUTION ORAL DAILY
Status: DISCONTINUED | OUTPATIENT
Start: 2025-01-17 | End: 2025-01-20 | Stop reason: HOSPADM

## 2025-01-17 RX ORDER — TAMSULOSIN HYDROCHLORIDE 0.4 MG/1
0.4 CAPSULE ORAL DAILY
Status: DISCONTINUED | OUTPATIENT
Start: 2025-01-18 | End: 2025-01-20 | Stop reason: HOSPADM

## 2025-01-17 RX ORDER — PANTOPRAZOLE SODIUM 40 MG/1
40 TABLET, DELAYED RELEASE ORAL
Status: DISCONTINUED | OUTPATIENT
Start: 2025-01-18 | End: 2025-01-20 | Stop reason: HOSPADM

## 2025-01-17 RX ORDER — ENOXAPARIN SODIUM 100 MG/ML
1 INJECTION SUBCUTANEOUS 2 TIMES DAILY
Status: DISCONTINUED | OUTPATIENT
Start: 2025-01-17 | End: 2025-01-20 | Stop reason: HOSPADM

## 2025-01-17 RX ORDER — ACETAMINOPHEN 160 MG/5ML
650 SOLUTION ORAL EVERY 4 HOURS PRN
Status: DISCONTINUED | OUTPATIENT
Start: 2025-01-17 | End: 2025-01-20 | Stop reason: HOSPADM

## 2025-01-17 RX ORDER — LISINOPRIL 5 MG/1
5 TABLET ORAL DAILY
Status: DISCONTINUED | OUTPATIENT
Start: 2025-01-18 | End: 2025-01-20 | Stop reason: HOSPADM

## 2025-01-17 RX ORDER — ONDANSETRON HYDROCHLORIDE 2 MG/ML
4 INJECTION, SOLUTION INTRAVENOUS EVERY 8 HOURS PRN
Status: DISCONTINUED | OUTPATIENT
Start: 2025-01-17 | End: 2025-01-20 | Stop reason: HOSPADM

## 2025-01-17 RX ORDER — ONDANSETRON 4 MG/1
4 TABLET, ORALLY DISINTEGRATING ORAL EVERY 8 HOURS PRN
Status: DISCONTINUED | OUTPATIENT
Start: 2025-01-17 | End: 2025-01-20 | Stop reason: HOSPADM

## 2025-01-17 RX ORDER — MORPHINE SULFATE 2 MG/ML
0.5 INJECTION, SOLUTION INTRAMUSCULAR; INTRAVENOUS EVERY 4 HOURS PRN
Status: DISCONTINUED | OUTPATIENT
Start: 2025-01-17 | End: 2025-01-20 | Stop reason: HOSPADM

## 2025-01-17 RX ADMIN — NITROGLYCERIN 0.5 INCH: 20 OINTMENT TOPICAL at 13:58

## 2025-01-17 RX ADMIN — TICAGRELOR 90 MG: 90 TABLET ORAL at 20:20

## 2025-01-17 RX ADMIN — ATORVASTATIN CALCIUM 40 MG: 40 TABLET, FILM COATED ORAL at 20:20

## 2025-01-17 RX ADMIN — NITROGLYCERIN 0.4 MG: 0.4 TABLET SUBLINGUAL at 12:45

## 2025-01-17 RX ADMIN — ENOXAPARIN SODIUM 90 MG: 100 INJECTION SUBCUTANEOUS at 13:57

## 2025-01-17 SDOH — ECONOMIC STABILITY: HOUSING INSECURITY: IN THE LAST 12 MONTHS, WAS THERE A TIME WHEN YOU WERE NOT ABLE TO PAY THE MORTGAGE OR RENT ON TIME?: NO

## 2025-01-17 SDOH — ECONOMIC STABILITY: INCOME INSECURITY: IN THE PAST 12 MONTHS HAS THE ELECTRIC, GAS, OIL, OR WATER COMPANY THREATENED TO SHUT OFF SERVICES IN YOUR HOME?: NO

## 2025-01-17 SDOH — SOCIAL STABILITY: SOCIAL INSECURITY
WITHIN THE LAST YEAR, HAVE YOU BEEN RAPED OR FORCED TO HAVE ANY KIND OF SEXUAL ACTIVITY BY YOUR PARTNER OR EX-PARTNER?: NO

## 2025-01-17 SDOH — SOCIAL STABILITY: SOCIAL INSECURITY: HAS ANYONE EVER THREATENED TO HURT YOUR FAMILY OR YOUR PETS?: NO

## 2025-01-17 SDOH — ECONOMIC STABILITY: TRANSPORTATION INSECURITY: IN THE PAST 12 MONTHS, HAS LACK OF TRANSPORTATION KEPT YOU FROM MEDICAL APPOINTMENTS OR FROM GETTING MEDICATIONS?: NO

## 2025-01-17 SDOH — SOCIAL STABILITY: SOCIAL INSECURITY: WITHIN THE LAST YEAR, HAVE YOU BEEN AFRAID OF YOUR PARTNER OR EX-PARTNER?: NO

## 2025-01-17 SDOH — SOCIAL STABILITY: SOCIAL INSECURITY: DO YOU FEEL UNSAFE GOING BACK TO THE PLACE WHERE YOU ARE LIVING?: NO

## 2025-01-17 SDOH — SOCIAL STABILITY: SOCIAL INSECURITY: DO YOU FEEL ANYONE HAS EXPLOITED OR TAKEN ADVANTAGE OF YOU FINANCIALLY OR OF YOUR PERSONAL PROPERTY?: NO

## 2025-01-17 SDOH — SOCIAL STABILITY: SOCIAL INSECURITY: WITHIN THE LAST YEAR, HAVE YOU BEEN HUMILIATED OR EMOTIONALLY ABUSED IN OTHER WAYS BY YOUR PARTNER OR EX-PARTNER?: NO

## 2025-01-17 SDOH — SOCIAL STABILITY: SOCIAL INSECURITY: DOES ANYONE TRY TO KEEP YOU FROM HAVING/CONTACTING OTHER FRIENDS OR DOING THINGS OUTSIDE YOUR HOME?: NO

## 2025-01-17 SDOH — SOCIAL STABILITY: SOCIAL INSECURITY: HAVE YOU HAD THOUGHTS OF HARMING ANYONE ELSE?: NO

## 2025-01-17 SDOH — ECONOMIC STABILITY: FOOD INSECURITY: WITHIN THE PAST 12 MONTHS, THE FOOD YOU BOUGHT JUST DIDN'T LAST AND YOU DIDN'T HAVE MONEY TO GET MORE.: NEVER TRUE

## 2025-01-17 SDOH — SOCIAL STABILITY: SOCIAL INSECURITY
WITHIN THE LAST YEAR, HAVE YOU BEEN KICKED, HIT, SLAPPED, OR OTHERWISE PHYSICALLY HURT BY YOUR PARTNER OR EX-PARTNER?: NO

## 2025-01-17 SDOH — ECONOMIC STABILITY: FOOD INSECURITY: WITHIN THE PAST 12 MONTHS, YOU WORRIED THAT YOUR FOOD WOULD RUN OUT BEFORE YOU GOT THE MONEY TO BUY MORE.: NEVER TRUE

## 2025-01-17 SDOH — SOCIAL STABILITY: SOCIAL INSECURITY: ARE YOU OR HAVE YOU BEEN THREATENED OR ABUSED PHYSICALLY, EMOTIONALLY, OR SEXUALLY BY ANYONE?: NO

## 2025-01-17 SDOH — SOCIAL STABILITY: SOCIAL INSECURITY: ABUSE: ADULT

## 2025-01-17 SDOH — ECONOMIC STABILITY: FOOD INSECURITY: HOW HARD IS IT FOR YOU TO PAY FOR THE VERY BASICS LIKE FOOD, HOUSING, MEDICAL CARE, AND HEATING?: NOT HARD AT ALL

## 2025-01-17 SDOH — SOCIAL STABILITY: SOCIAL INSECURITY: HAVE YOU HAD ANY THOUGHTS OF HARMING ANYONE ELSE?: NO

## 2025-01-17 SDOH — ECONOMIC STABILITY: HOUSING INSECURITY: AT ANY TIME IN THE PAST 12 MONTHS, WERE YOU HOMELESS OR LIVING IN A SHELTER (INCLUDING NOW)?: NO

## 2025-01-17 SDOH — SOCIAL STABILITY: SOCIAL INSECURITY: ARE THERE ANY APPARENT SIGNS OF INJURIES/BEHAVIORS THAT COULD BE RELATED TO ABUSE/NEGLECT?: NO

## 2025-01-17 SDOH — SOCIAL STABILITY: SOCIAL INSECURITY: WERE YOU ABLE TO COMPLETE ALL THE BEHAVIORAL HEALTH SCREENINGS?: YES

## 2025-01-17 ASSESSMENT — COGNITIVE AND FUNCTIONAL STATUS - GENERAL
CLIMB 3 TO 5 STEPS WITH RAILING: A LITTLE
WALKING IN HOSPITAL ROOM: A LITTLE
MOBILITY SCORE: 22
PATIENT BASELINE BEDBOUND: NO
DAILY ACTIVITIY SCORE: 24

## 2025-01-17 ASSESSMENT — LIFESTYLE VARIABLES
HOW MANY STANDARD DRINKS CONTAINING ALCOHOL DO YOU HAVE ON A TYPICAL DAY: PATIENT DOES NOT DRINK
HOW OFTEN DO YOU HAVE A DRINK CONTAINING ALCOHOL: NEVER
SKIP TO QUESTIONS 9-10: 1
HOW OFTEN DO YOU HAVE 6 OR MORE DRINKS ON ONE OCCASION: NEVER
AUDIT-C TOTAL SCORE: 0
AUDIT-C TOTAL SCORE: 0

## 2025-01-17 ASSESSMENT — ACTIVITIES OF DAILY LIVING (ADL)
LACK_OF_TRANSPORTATION: NO
FEEDING YOURSELF: INDEPENDENT
HEARING - RIGHT EAR: HEARING AID
DRESSING YOURSELF: INDEPENDENT
HEARING - LEFT EAR: HEARING AID
TOILETING: INDEPENDENT
WALKS IN HOME: INDEPENDENT
GROOMING: INDEPENDENT
PATIENT'S MEMORY ADEQUATE TO SAFELY COMPLETE DAILY ACTIVITIES?: YES
JUDGMENT_ADEQUATE_SAFELY_COMPLETE_DAILY_ACTIVITIES: YES
LACK_OF_TRANSPORTATION: NO
ADEQUATE_TO_COMPLETE_ADL: YES
BATHING: INDEPENDENT

## 2025-01-17 ASSESSMENT — PAIN SCALES - GENERAL
PAINLEVEL_OUTOF10: 2

## 2025-01-17 ASSESSMENT — PAIN - FUNCTIONAL ASSESSMENT
PAIN_FUNCTIONAL_ASSESSMENT: 0-10

## 2025-01-17 ASSESSMENT — PAIN DESCRIPTION - PAIN TYPE: TYPE: ACUTE PAIN

## 2025-01-17 ASSESSMENT — PAIN DESCRIPTION - LOCATION: LOCATION: CHEST

## 2025-01-17 ASSESSMENT — PATIENT HEALTH QUESTIONNAIRE - PHQ9
SUM OF ALL RESPONSES TO PHQ9 QUESTIONS 1 & 2: 0
1. LITTLE INTEREST OR PLEASURE IN DOING THINGS: NOT AT ALL
2. FEELING DOWN, DEPRESSED OR HOPELESS: NOT AT ALL

## 2025-01-17 ASSESSMENT — PAIN DESCRIPTION - DESCRIPTORS: DESCRIPTORS: SORE

## 2025-01-17 NOTE — H&P (VIEW-ONLY)
Inpatient consult to Cardiology  Consult performed by: Tyshawn Dixon MD  Consult ordered by: Marck Ortiz DO  Reason for consult: NSTEMI      History Of Present Illness:    Mr. Gael Burnette is a 74 y.o. former smoker diabetic male with prior medical history significant for CAD S/P PCI to LAD and OM (09/2024), hypertension, diabetes mellitus, hyperlipidemia, hypothyroidism. He presented to ED [facility] on 01/17/2025 complaining of chest pain. He explained that for the past weeks he have been experiencing chest pressure similar to the one that he had before the stent. The pain gets worse on exertion, especially lifting weights with his left arm, and improves by resting. He even went to ER before due to this pain. The pain increased significantly on 01/17/2025 and he returned to the hospital. He endorsed a severe chest pain for some minutes that improved with Nitroglycerin. Also had chest pain while walking from the bathroom to the bed. He denies shortness of breath, palpitations, leg edema, lightheadedness, headaches, fever, chills, orthopnea, paroxysmal nocturnal dyspnea or syncope. Notably, he had been missing many times his morning medications (forgot 12 times). EKG showed sinus rhythm with incomplete RBBB and non-specific ST-T changes. Troponin 279 - 310. BNP 54. ACS measurements have been started in the ER. He has been admitted for clinical compensation.     Last Recorded Vitals:  Vitals:    01/17/25 1440 01/17/25 1507 01/17/25 1530 01/17/25 1622   BP: 101/63 100/63 102/63 103/64   Pulse: 67 61 65 64   Resp: 18 18 18 18   Temp:       SpO2: 96% 94% 97% 97%   Weight:       Height:           Last Labs:  CBC - 1/17/2025: 10:52 AM  8.0 15.3 220    45.7      CMP - 1/17/2025: 10:52 AM  9.0 5.8 18 --- 0.6   _ 3.8 18 58      PTT - 1/17/2025:  1:31 PM  0.9   10.7 30     Troponin I, High Sensitivity   Date/Time Value Ref Range Status   01/17/2025 11:54  (HH) 0 - 20 ng/L Final     Comment:      Previous result verified on 1/17/2025 1128 on specimen/case [id number] called with component [facility] for procedure Troponin I, High Sensitivity, Initial with value 279 ng/L.   01/17/2025 10:52  (HH) 0 - 20 ng/L Final   01/13/2025 04:40 PM 7 0 - 20 ng/L Final     BNP   Date/Time Value Ref Range Status   09/09/2024 11:07 AM 54 0 - 99 pg/mL Final     Hemoglobin A1C   Date/Time Value Ref Range Status   01/07/2025 10:21 AM 7.0 (H) 4.3 - 5.6 % Final     Comment:     American Diabetes Association guidelines indicate that patients with HgbA1c in the range 5.7-6.4% are at increased risk for development of diabetes, and intervention by lifestyle modification may be beneficial. HgbA1c greater or equal to 6.5% is considered diagnostic of diabetes.   09/25/2024 01:28 PM 7.7 (H) 4.3 - 5.6 % Final     Comment:     American Diabetes Association guidelines indicate that patients with HgbA1c in the range 5.7-6.4% are at increased risk for development of diabetes, and intervention by lifestyle modification may be beneficial. HgbA1c greater or equal to 6.5% is considered diagnostic of diabetes.     LDL Calculated   Date/Time Value Ref Range Status   09/09/2024 02:50 PM 59 <=99 mg/dL Final     Comment:                                 Near   Borderline      AGE      Desirable  Optimal    High     High     Very High     0-19 Y     0 - 109     ---    110-129   >/= 130     ----    20-24 Y     0 - 119     ---    120-159   >/= 160     ----      >24 Y     0 -  99   100-129  130-159   160-189     >/=190       VLDL   Date/Time Value Ref Range Status   09/09/2024 02:50 PM 60 (H) 0 - 40 mg/dL Final      Last I/O:  No intake/output data recorded.    Past Cardiology Tests (Last 3 Years):  EKG:  ECG 12 lead 01/17/2025      ECG 12 lead (Clinic Performed) 01/14/2025      ECG 12 lead 01/13/2025 (Preliminary)      ECG 12 lead 01/13/2025 (Preliminary)      ECG 12 lead (Ancillary Performed) 01/08/2025      Electrocardiogram 12 Lead  09/16/2024      ECG 12 lead 09/14/2024      ECG 12 Lead 09/10/2024      ECG 12 lead 09/09/2024 (Wet Read)      ECG 12 lead 09/09/2024    Echo:  Transthoracic Echo (TTE) Complete 09/10/2024    Ejection Fractions:  EF   Date/Time Value Ref Range Status   09/10/2024 07:37 AM 60 %      Cath:  Cardiac Catheterization Procedure 09/16/2024      Cardiac Catheterization Procedure 09/10/2024    Stress Test:  No results found for this or any previous visit from the past 1095 days.    Cardiac Imaging:  No results found for this or any previous visit from the past 1095 days.      Past Medical History:  He has a past medical history of Coronary artery disease (09/2024), Diabetes mellitus (Multi) (2003), Disease of thyroid gland, Hyperlipidemia (2007), and Hypertension (2007).    Past Surgical History:  He has a past surgical history that includes Cardiac catheterization (N/A, 9/10/2024); Cardiac catheterization (N/A, 9/10/2024); Cardiac catheterization (N/A, 9/10/2024); and Cardiac catheterization (N/A, 9/16/2024).      Social History:  He reports that he quit smoking about 43 years ago. His smoking use included cigarettes. He started smoking about 55 years ago. He has a 17.1 pack-year smoking history. He has never used smokeless tobacco. He reports that he does not drink alcohol and does not use drugs.    Family History:  Family History   Problem Relation Name Age of Onset    Heart disease Father Selvin MAGALLON Yomaira     Heart attack Brother Yosef STALLWORTH Yomaira     Heart disease Other brother         Allergies:  Patient has no known allergies.    Inpatient Medications:        Outpatient Medications:  Current Outpatient Medications   Medication Instructions    ascorbic acid (VITAMIN C) 1,000 mg, Daily    aspirin 81 mg, Daily    atorvastatin (LIPITOR) 40 mg, oral, Nightly    b complex 0.4 mg tablet 1 tablet, Daily    cinnamon (CINNAMON) 1,000 mg, 2 times daily    empagliflozin (JARDIANCE) 25 mg, Daily    FreeStyle Lancets 28 gauge 1 each, As  needed    FreeStyle Lite Strips strip 1 each, Daily    glipiZIDE (GLUCOTROL) 10 mg, 2 times daily before meals    glucosamine-chondroitin (Osteo Bi-Flex) 250-200 mg tablet 1 tablet, Every morning    levothyroxine (SYNTHROID, LEVOXYL) 112 mcg, Daily    lisinopril 5 mg, Daily    metoprolol succinate XL (TOPROL-XL) 50 mg, oral, Daily, Do not crush or chew.    multivitamin tablet 1 tablet, Daily    omega 3-dha-epa-fish oil (Fish OiL) 1,200 (144-216) mg capsule 1 capsule, Daily    omeprazole (PRILOSEC) 40 mg, Daily before breakfast    SITagliptin phos-metformin (Janumet) 50-1,000 mg tablet 1 tablet, 2 times daily (morning and late afternoon)    tamsulosin (FLOMAX) 0.4 mg, Daily    ticagrelor (BRILINTA) 90 mg, 2 times daily    triamcinolone (Kenalog) 0.1 % cream Daily PRN       Physical Exam:  General: alert, oriented and in no acute distress  HEENT: NC/AT; EOMI; PERRLA, external ear is normal  Neck: supple; trachea midline; no masses; no JVD  Chest: clear breath sounds bilaterally; no wheezing  Cardio: regular rhythm, S1S2 normal, no murmurs  Abdomen: Soft, non-tender, non-distension, no organomegaly  Extremities: no clubbing/cyanosis/edema  Neuro: Grossly intact     Psychiatric: Normal mood and affect      Assessment/Plan   Mr. Gael Burnette is a 74 y.o. former smoker diabetic male with prior medical history significant for CAD S/P PCI to LAD and OM (09/2024), hypertension, diabetes mellitus, hyperlipidemia, hypothyroidism. He presented to ED [facility] on 01/17/2025 complaining of chest pain. He explained that for the past weeks he have been experiencing chest pressure similar to the one that he had before the stent. The pain gets worse on exertion, especially lifting weights with his left arm, and improves by resting. He even went to ER before due to this pain. The pain increased significantly on 01/17/2025 and he returned to the hospital. He endorsed a severe chest pain for some minutes that improved with  Nitroglycerin. Also had chest pain while walking from the bathroom to the bed. He denies shortness of breath, palpitations, leg edema, lightheadedness, headaches, fever, chills, orthopnea, paroxysmal nocturnal dyspnea or syncope. Notably, he had been missing many times his morning medications (forgot 12 times). EKG showed sinus rhythm with incomplete RBBB and non-specific ST-T changes. Troponin 279 - 310. BNP 54. ACS measurements have been started in the ER. He has been admitted for clinical compensation.    Assessment    # NSTEMI / CAD S/P PCI to LAD / Ramus and 1st OM  - Troponin 279 - 310.   - BNP 54.   - Progressively worsening chest pain even while resting.  - EKG showed sinus rhythm with non-specific ST-T changes.   - Left Heart Catheterization (09/10/2024):  Double vessel Coronary Artery Disease  Mid LAD 95% calcified lesion - bifurcation with 1st Dg (70% ostial lesion) and 2nd Dg (50% ostial lesion)   Mid 1st OM 90% calcified lesion  RCA with irregularities  Preserved LV systolic function  S/P Successful PCI to mid LAD (bifurcation with 1st and 2nd Dg) using one XOCHILT, guided by IVUS  - Left Heart Catheterization (09/10/2024):  Patent stent to mid LAD  Mid 1st OM 90% calcified lesion  Prox-mid Ramus 95% diffuse lesion  S/P Successful PCI to prox-mid ramus using one XOCHILT, guided by IVUS  S/P Successful PCI to mid 1st OM (bifurcation lesion) using one XOCHILT, guided by IVUS  - Patient with known atherosclerotic disease, implying significant increased risk for major adverse cardiac events.  - We had a thorough conversation with the patient about the natural history of atherosclerosis and the importance for commitment with healthy lifestyle, including but not limited to healthy diet, regular exercises, avoid sedentary and compliance to medication.   - We will request echocardiogram and Left Heart Catheterization.  - The natural history of atherosclerosis was discussed with the patient. The treatment options including  GDMT, percutaneous intervention or surgical intervention were discussed with the patient. All the risks, benefits and alternative procedures were discussed. Complications of the procedure were also discussed, including but not limited to risk of bleeding, stroke, infarct, infection, urgent cardiac surgery or death. All questions were answered and the informed consent was obtained. After aforementioned testing and consultation, Left Heart Catheterization with potential Percutaneous Coronary Intervention would be a reasonable approach if the patient is candidate.  - Please keep NPO on Sunday 01/19 after midnight for potential procedure on Monday 01/20 by Morning.  - Would suggest to keep Heparinization, ASA, Ticagrelor, high intensity statin, beta-blocker.  - Follow up with Cardiology team upon discharge.    # Hypertension  - Controlled blood pressure.  - Keep current medications including lisinopril 5mg daily.  - Patient counseled to keep a healthy lifestyle including regular exercise and low-sodium diet.  - Recommended home blood pressure monitoring.  - Goal of BP < 130/80mmHg.     # Diabetes  - Counseled on healthy diet and regular exercises.  - Discussed need for weight loss and the benefits.   - Keep current medications including empaglifozin, glipizide.     # Hyperlipidemia  - Keep home medication with Atorvastatin 40mg daily.  - Counseled on healthy diet and regular exercise.      # Hypothyroidism  - Keep Levothyroxine 112mcg daily     Patient was seen in the ER. This critically ill patient continues to be at-risk for clinically significant deterioration / failure due to the above mentioned dysfunctional, unstable organ systems.  I have personally identified and managed all complex critical care issues to prevent aforementioned clinical deterioration.  Critical care time is spent at bedside and/or the immediate area and has included, but is not limited to, the review of diagnostic tests, labs, radiographs,  serial assessments of hemodynamics, respiratory status, ventilatory management, and family updates.  Time spent in procedures and teaching are reported separately.    Critical care time: 65 minutes     Code Status:  Full Code    Tyshawn Dixon MD  Cardiology

## 2025-01-17 NOTE — Clinical Note
Angioplasty of the left anterior descending lesion. Inflation 1: Pressure = 12 susan; Duration = 15 sec. Inflation 2: Pressure = 12 susan; Duration = 15 sec. Inflation 3: Pressure = 15 susan; Duration = 15 sec. Inflation 4: Pressure = 12 susan; Duration = 12 sec. Balloon catheter removed after multiple inflation(s).

## 2025-01-17 NOTE — Clinical Note
Vessel: LAD (mid). Inflation 1: Pressure = 12 susan; Duration = 30 sec. Stent balloon removed after multiple inflation(s). 72

## 2025-01-17 NOTE — CONSULTS
Inpatient consult to Cardiology  Consult performed by: Tyshawn Dixon MD  Consult ordered by: Markc Ortiz DO  Reason for consult: NSTEMI      History Of Present Illness:    Mr. Gael Burnette is a 74 y.o. former smoker diabetic male with prior medical history significant for CAD S/P PCI to LAD and OM (09/2024), hypertension, diabetes mellitus, hyperlipidemia, hypothyroidism. He presented to ED [facility] on 01/17/2025 complaining of chest pain. He explained that for the past weeks he have been experiencing chest pressure similar to the one that he had before the stent. The pain gets worse on exertion, especially lifting weights with his left arm, and improves by resting. He even went to ER before due to this pain. The pain increased significantly on 01/17/2025 and he returned to the hospital. He endorsed a severe chest pain for some minutes that improved with Nitroglycerin. Also had chest pain while walking from the bathroom to the bed. He denies shortness of breath, palpitations, leg edema, lightheadedness, headaches, fever, chills, orthopnea, paroxysmal nocturnal dyspnea or syncope. Notably, he had been missing many times his morning medications (forgot 12 times). EKG showed sinus rhythm with incomplete RBBB and non-specific ST-T changes. Troponin 279 - 310. BNP 54. ACS measurements have been started in the ER. He has been admitted for clinical compensation.     Last Recorded Vitals:  Vitals:    01/17/25 1440 01/17/25 1507 01/17/25 1530 01/17/25 1622   BP: 101/63 100/63 102/63 103/64   Pulse: 67 61 65 64   Resp: 18 18 18 18   Temp:       SpO2: 96% 94% 97% 97%   Weight:       Height:           Last Labs:  CBC - 1/17/2025: 10:52 AM  8.0 15.3 220    45.7      CMP - 1/17/2025: 10:52 AM  9.0 5.8 18 --- 0.6   _ 3.8 18 58      PTT - 1/17/2025:  1:31 PM  0.9   10.7 30     Troponin I, High Sensitivity   Date/Time Value Ref Range Status   01/17/2025 11:54  (HH) 0 - 20 ng/L Final     Comment:      Previous result verified on 1/17/2025 1128 on specimen/case [id number] called with component [facility] for procedure Troponin I, High Sensitivity, Initial with value 279 ng/L.   01/17/2025 10:52  (HH) 0 - 20 ng/L Final   01/13/2025 04:40 PM 7 0 - 20 ng/L Final     BNP   Date/Time Value Ref Range Status   09/09/2024 11:07 AM 54 0 - 99 pg/mL Final     Hemoglobin A1C   Date/Time Value Ref Range Status   01/07/2025 10:21 AM 7.0 (H) 4.3 - 5.6 % Final     Comment:     American Diabetes Association guidelines indicate that patients with HgbA1c in the range 5.7-6.4% are at increased risk for development of diabetes, and intervention by lifestyle modification may be beneficial. HgbA1c greater or equal to 6.5% is considered diagnostic of diabetes.   09/25/2024 01:28 PM 7.7 (H) 4.3 - 5.6 % Final     Comment:     American Diabetes Association guidelines indicate that patients with HgbA1c in the range 5.7-6.4% are at increased risk for development of diabetes, and intervention by lifestyle modification may be beneficial. HgbA1c greater or equal to 6.5% is considered diagnostic of diabetes.     LDL Calculated   Date/Time Value Ref Range Status   09/09/2024 02:50 PM 59 <=99 mg/dL Final     Comment:                                 Near   Borderline      AGE      Desirable  Optimal    High     High     Very High     0-19 Y     0 - 109     ---    110-129   >/= 130     ----    20-24 Y     0 - 119     ---    120-159   >/= 160     ----      >24 Y     0 -  99   100-129  130-159   160-189     >/=190       VLDL   Date/Time Value Ref Range Status   09/09/2024 02:50 PM 60 (H) 0 - 40 mg/dL Final      Last I/O:  No intake/output data recorded.    Past Cardiology Tests (Last 3 Years):  EKG:  ECG 12 lead 01/17/2025      ECG 12 lead (Clinic Performed) 01/14/2025      ECG 12 lead 01/13/2025 (Preliminary)      ECG 12 lead 01/13/2025 (Preliminary)      ECG 12 lead (Ancillary Performed) 01/08/2025      Electrocardiogram 12 Lead  09/16/2024      ECG 12 lead 09/14/2024      ECG 12 Lead 09/10/2024      ECG 12 lead 09/09/2024 (Wet Read)      ECG 12 lead 09/09/2024    Echo:  Transthoracic Echo (TTE) Complete 09/10/2024    Ejection Fractions:  EF   Date/Time Value Ref Range Status   09/10/2024 07:37 AM 60 %      Cath:  Cardiac Catheterization Procedure 09/16/2024      Cardiac Catheterization Procedure 09/10/2024    Stress Test:  No results found for this or any previous visit from the past 1095 days.    Cardiac Imaging:  No results found for this or any previous visit from the past 1095 days.      Past Medical History:  He has a past medical history of Coronary artery disease (09/2024), Diabetes mellitus (Multi) (2003), Disease of thyroid gland, Hyperlipidemia (2007), and Hypertension (2007).    Past Surgical History:  He has a past surgical history that includes Cardiac catheterization (N/A, 9/10/2024); Cardiac catheterization (N/A, 9/10/2024); Cardiac catheterization (N/A, 9/10/2024); and Cardiac catheterization (N/A, 9/16/2024).      Social History:  He reports that he quit smoking about 43 years ago. His smoking use included cigarettes. He started smoking about 55 years ago. He has a 17.1 pack-year smoking history. He has never used smokeless tobacco. He reports that he does not drink alcohol and does not use drugs.    Family History:  Family History   Problem Relation Name Age of Onset    Heart disease Father Selvin MAGALLON Yomaira     Heart attack Brother Yosef STALLWORTH Yomaira     Heart disease Other brother         Allergies:  Patient has no known allergies.    Inpatient Medications:        Outpatient Medications:  Current Outpatient Medications   Medication Instructions    ascorbic acid (VITAMIN C) 1,000 mg, Daily    aspirin 81 mg, Daily    atorvastatin (LIPITOR) 40 mg, oral, Nightly    b complex 0.4 mg tablet 1 tablet, Daily    cinnamon (CINNAMON) 1,000 mg, 2 times daily    empagliflozin (JARDIANCE) 25 mg, Daily    FreeStyle Lancets 28 gauge 1 each, As  needed    FreeStyle Lite Strips strip 1 each, Daily    glipiZIDE (GLUCOTROL) 10 mg, 2 times daily before meals    glucosamine-chondroitin (Osteo Bi-Flex) 250-200 mg tablet 1 tablet, Every morning    levothyroxine (SYNTHROID, LEVOXYL) 112 mcg, Daily    lisinopril 5 mg, Daily    metoprolol succinate XL (TOPROL-XL) 50 mg, oral, Daily, Do not crush or chew.    multivitamin tablet 1 tablet, Daily    omega 3-dha-epa-fish oil (Fish OiL) 1,200 (144-216) mg capsule 1 capsule, Daily    omeprazole (PRILOSEC) 40 mg, Daily before breakfast    SITagliptin phos-metformin (Janumet) 50-1,000 mg tablet 1 tablet, 2 times daily (morning and late afternoon)    tamsulosin (FLOMAX) 0.4 mg, Daily    ticagrelor (BRILINTA) 90 mg, 2 times daily    triamcinolone (Kenalog) 0.1 % cream Daily PRN       Physical Exam:  General: alert, oriented and in no acute distress  HEENT: NC/AT; EOMI; PERRLA, external ear is normal  Neck: supple; trachea midline; no masses; no JVD  Chest: clear breath sounds bilaterally; no wheezing  Cardio: regular rhythm, S1S2 normal, no murmurs  Abdomen: Soft, non-tender, non-distension, no organomegaly  Extremities: no clubbing/cyanosis/edema  Neuro: Grossly intact     Psychiatric: Normal mood and affect      Assessment/Plan   Mr. Gael Burnette is a 74 y.o. former smoker diabetic male with prior medical history significant for CAD S/P PCI to LAD and OM (09/2024), hypertension, diabetes mellitus, hyperlipidemia, hypothyroidism. He presented to ED [facility] on 01/17/2025 complaining of chest pain. He explained that for the past weeks he have been experiencing chest pressure similar to the one that he had before the stent. The pain gets worse on exertion, especially lifting weights with his left arm, and improves by resting. He even went to ER before due to this pain. The pain increased significantly on 01/17/2025 and he returned to the hospital. He endorsed a severe chest pain for some minutes that improved with  Nitroglycerin. Also had chest pain while walking from the bathroom to the bed. He denies shortness of breath, palpitations, leg edema, lightheadedness, headaches, fever, chills, orthopnea, paroxysmal nocturnal dyspnea or syncope. Notably, he had been missing many times his morning medications (forgot 12 times). EKG showed sinus rhythm with incomplete RBBB and non-specific ST-T changes. Troponin 279 - 310. BNP 54. ACS measurements have been started in the ER. He has been admitted for clinical compensation.    Assessment    # NSTEMI / CAD S/P PCI to LAD / Ramus and 1st OM  - Troponin 279 - 310.   - BNP 54.   - Progressively worsening chest pain even while resting.  - EKG showed sinus rhythm with non-specific ST-T changes.   - Left Heart Catheterization (09/10/2024):  Double vessel Coronary Artery Disease  Mid LAD 95% calcified lesion - bifurcation with 1st Dg (70% ostial lesion) and 2nd Dg (50% ostial lesion)   Mid 1st OM 90% calcified lesion  RCA with irregularities  Preserved LV systolic function  S/P Successful PCI to mid LAD (bifurcation with 1st and 2nd Dg) using one XOCHILT, guided by IVUS  - Left Heart Catheterization (09/10/2024):  Patent stent to mid LAD  Mid 1st OM 90% calcified lesion  Prox-mid Ramus 95% diffuse lesion  S/P Successful PCI to prox-mid ramus using one XOCHILT, guided by IVUS  S/P Successful PCI to mid 1st OM (bifurcation lesion) using one XOCHILT, guided by IVUS  - Patient with known atherosclerotic disease, implying significant increased risk for major adverse cardiac events.  - We had a thorough conversation with the patient about the natural history of atherosclerosis and the importance for commitment with healthy lifestyle, including but not limited to healthy diet, regular exercises, avoid sedentary and compliance to medication.   - We will request echocardiogram and Left Heart Catheterization.  - The natural history of atherosclerosis was discussed with the patient. The treatment options including  GDMT, percutaneous intervention or surgical intervention were discussed with the patient. All the risks, benefits and alternative procedures were discussed. Complications of the procedure were also discussed, including but not limited to risk of bleeding, stroke, infarct, infection, urgent cardiac surgery or death. All questions were answered and the informed consent was obtained. After aforementioned testing and consultation, Left Heart Catheterization with potential Percutaneous Coronary Intervention would be a reasonable approach if the patient is candidate.  - Please keep NPO on Sunday 01/19 after midnight for potential procedure on Monday 01/20 by Morning.  - Would suggest to keep Heparinization, ASA, Ticagrelor, high intensity statin, beta-blocker.  - Follow up with Cardiology team upon discharge.    # Hypertension  - Controlled blood pressure.  - Keep current medications including lisinopril 5mg daily.  - Patient counseled to keep a healthy lifestyle including regular exercise and low-sodium diet.  - Recommended home blood pressure monitoring.  - Goal of BP < 130/80mmHg.     # Diabetes  - Counseled on healthy diet and regular exercises.  - Discussed need for weight loss and the benefits.   - Keep current medications including empaglifozin, glipizide.     # Hyperlipidemia  - Keep home medication with Atorvastatin 40mg daily.  - Counseled on healthy diet and regular exercise.      # Hypothyroidism  - Keep Levothyroxine 112mcg daily     Patient was seen in the ER. This critically ill patient continues to be at-risk for clinically significant deterioration / failure due to the above mentioned dysfunctional, unstable organ systems.  I have personally identified and managed all complex critical care issues to prevent aforementioned clinical deterioration.  Critical care time is spent at bedside and/or the immediate area and has included, but is not limited to, the review of diagnostic tests, labs, radiographs,  serial assessments of hemodynamics, respiratory status, ventilatory management, and family updates.  Time spent in procedures and teaching are reported separately.    Critical care time: 65 minutes     Code Status:  Full Code    Tyshawn Dixon MD  Cardiology

## 2025-01-17 NOTE — Clinical Note
Vessel: LAD (proximal). Stent inserted. Inflation 1: Pressure = 12 susan; Duration = 30 sec. Inflation 2: Pressure = 12 susan; Duration = 12 sec. Stent balloon removed after multiple inflation(s).

## 2025-01-17 NOTE — Clinical Note
Angioplasty of the left anterior descending lesion. Inflation 1: Pressure = 12 susan; Duration = 12 sec. Inflation 2: Pressure = 20 susan; Duration = 15 sec. Inflation 3: Pressure = 15 susan; Duration = 15 sec. Balloon catheter removed after multiple inflation(s).

## 2025-01-17 NOTE — Clinical Note
Inflation 1: Pressure = 12 susan; Duration = 12 sec. Inflation 2: Pressure = 12 susan; Duration = 15 sec. Balloon catheter removed after multiple inflation(s).

## 2025-01-17 NOTE — Clinical Note
Angioplasty of the left anterior descending lesion. Inflation 1: Pressure = 12 susan; Duration = 12 sec. Inflation 2: Pressure = 12 susan; Duration = 15 sec. Inflation 3: Pressure = 8 susan; Duration = 10 sec. Balloon catheter removed after multiple inflation(s).

## 2025-01-18 ENCOUNTER — APPOINTMENT (OUTPATIENT)
Dept: CARDIOLOGY | Facility: HOSPITAL | Age: 75
DRG: 321 | End: 2025-01-18
Payer: MEDICARE

## 2025-01-18 LAB
ALBUMIN SERPL BCP-MCNC: 4.1 G/DL (ref 3.4–5)
ALP SERPL-CCNC: 62 U/L (ref 33–136)
ALT SERPL W P-5'-P-CCNC: 20 U/L (ref 10–52)
ANION GAP SERPL CALC-SCNC: 11 MMOL/L (ref 10–20)
AST SERPL W P-5'-P-CCNC: 30 U/L (ref 9–39)
BILIRUB SERPL-MCNC: 0.6 MG/DL (ref 0–1.2)
BUN SERPL-MCNC: 26 MG/DL (ref 6–23)
CALCIUM SERPL-MCNC: 9.9 MG/DL (ref 8.6–10.3)
CARDIAC TROPONIN I PNL SERPL HS: 2000 NG/L (ref 0–20)
CHLORIDE SERPL-SCNC: 103 MMOL/L (ref 98–107)
CO2 SERPL-SCNC: 29 MMOL/L (ref 21–32)
CREAT SERPL-MCNC: 1.26 MG/DL (ref 0.5–1.3)
EGFRCR SERPLBLD CKD-EPI 2021: 61 ML/MIN/1.73M*2
ERYTHROCYTE [DISTWIDTH] IN BLOOD BY AUTOMATED COUNT: 12.4 % (ref 11.5–14.5)
GLUCOSE BLD MANUAL STRIP-MCNC: 161 MG/DL (ref 74–99)
GLUCOSE BLD MANUAL STRIP-MCNC: 172 MG/DL (ref 74–99)
GLUCOSE BLD MANUAL STRIP-MCNC: 214 MG/DL (ref 74–99)
GLUCOSE BLD MANUAL STRIP-MCNC: 219 MG/DL (ref 74–99)
GLUCOSE SERPL-MCNC: 172 MG/DL (ref 74–99)
HCT VFR BLD AUTO: 49.4 % (ref 41–52)
HGB BLD-MCNC: 16.7 G/DL (ref 13.5–17.5)
MCH RBC QN AUTO: 31.6 PG (ref 26–34)
MCHC RBC AUTO-ENTMCNC: 33.8 G/DL (ref 32–36)
MCV RBC AUTO: 94 FL (ref 80–100)
NRBC BLD-RTO: 0 /100 WBCS (ref 0–0)
PLATELET # BLD AUTO: 257 X10*3/UL (ref 150–450)
POTASSIUM SERPL-SCNC: 4.3 MMOL/L (ref 3.5–5.3)
PROT SERPL-MCNC: 6.4 G/DL (ref 6.4–8.2)
RBC # BLD AUTO: 5.28 X10*6/UL (ref 4.5–5.9)
SODIUM SERPL-SCNC: 139 MMOL/L (ref 136–145)
WBC # BLD AUTO: 11 X10*3/UL (ref 4.4–11.3)

## 2025-01-18 PROCEDURE — 85027 COMPLETE CBC AUTOMATED: CPT | Performed by: HOSPITALIST

## 2025-01-18 PROCEDURE — 1100000001 HC PRIVATE ROOM DAILY

## 2025-01-18 PROCEDURE — 99233 SBSQ HOSP IP/OBS HIGH 50: CPT | Performed by: HOSPITALIST

## 2025-01-18 PROCEDURE — 84484 ASSAY OF TROPONIN QUANT: CPT | Performed by: STUDENT IN AN ORGANIZED HEALTH CARE EDUCATION/TRAINING PROGRAM

## 2025-01-18 PROCEDURE — 2500000001 HC RX 250 WO HCPCS SELF ADMINISTERED DRUGS (ALT 637 FOR MEDICARE OP): Performed by: HOSPITALIST

## 2025-01-18 PROCEDURE — 93010 ELECTROCARDIOGRAM REPORT: CPT | Performed by: STUDENT IN AN ORGANIZED HEALTH CARE EDUCATION/TRAINING PROGRAM

## 2025-01-18 PROCEDURE — 36415 COLL VENOUS BLD VENIPUNCTURE: CPT | Performed by: HOSPITALIST

## 2025-01-18 PROCEDURE — 93005 ELECTROCARDIOGRAM TRACING: CPT

## 2025-01-18 PROCEDURE — 1200000002 HC GENERAL ROOM WITH TELEMETRY DAILY

## 2025-01-18 PROCEDURE — 2500000002 HC RX 250 W HCPCS SELF ADMINISTERED DRUGS (ALT 637 FOR MEDICARE OP, ALT 636 FOR OP/ED): Performed by: HOSPITALIST

## 2025-01-18 PROCEDURE — 99222 1ST HOSP IP/OBS MODERATE 55: CPT | Performed by: STUDENT IN AN ORGANIZED HEALTH CARE EDUCATION/TRAINING PROGRAM

## 2025-01-18 PROCEDURE — 80053 COMPREHEN METABOLIC PANEL: CPT | Performed by: HOSPITALIST

## 2025-01-18 PROCEDURE — 36415 COLL VENOUS BLD VENIPUNCTURE: CPT | Performed by: STUDENT IN AN ORGANIZED HEALTH CARE EDUCATION/TRAINING PROGRAM

## 2025-01-18 PROCEDURE — 82947 ASSAY GLUCOSE BLOOD QUANT: CPT

## 2025-01-18 PROCEDURE — 2500000004 HC RX 250 GENERAL PHARMACY W/ HCPCS (ALT 636 FOR OP/ED): Performed by: HOSPITALIST

## 2025-01-18 RX ADMIN — TICAGRELOR 90 MG: 90 TABLET ORAL at 09:10

## 2025-01-18 RX ADMIN — ENOXAPARIN SODIUM 90 MG: 100 INJECTION SUBCUTANEOUS at 02:23

## 2025-01-18 RX ADMIN — INSULIN LISPRO 1 UNITS: 100 INJECTION, SOLUTION INTRAVENOUS; SUBCUTANEOUS at 17:33

## 2025-01-18 RX ADMIN — EMPAGLIFLOZIN 25 MG: 25 TABLET, FILM COATED ORAL at 09:10

## 2025-01-18 RX ADMIN — LISINOPRIL 5 MG: 5 TABLET ORAL at 09:11

## 2025-01-18 RX ADMIN — LEVOTHYROXINE SODIUM 112 MCG: 0.11 TABLET ORAL at 05:11

## 2025-01-18 RX ADMIN — MORPHINE SULFATE 1 MG: 2 INJECTION, SOLUTION INTRAMUSCULAR; INTRAVENOUS at 09:03

## 2025-01-18 RX ADMIN — INSULIN LISPRO 1 UNITS: 100 INJECTION, SOLUTION INTRAVENOUS; SUBCUTANEOUS at 09:08

## 2025-01-18 RX ADMIN — INSULIN LISPRO 2 UNITS: 100 INJECTION, SOLUTION INTRAVENOUS; SUBCUTANEOUS at 12:30

## 2025-01-18 RX ADMIN — TAMSULOSIN HYDROCHLORIDE 0.4 MG: 0.4 CAPSULE ORAL at 09:10

## 2025-01-18 RX ADMIN — METOPROLOL SUCCINATE 50 MG: 50 TABLET, EXTENDED RELEASE ORAL at 09:10

## 2025-01-18 RX ADMIN — ACETAMINOPHEN 650 MG: 325 TABLET ORAL at 05:11

## 2025-01-18 RX ADMIN — ENOXAPARIN SODIUM 90 MG: 100 INJECTION SUBCUTANEOUS at 15:13

## 2025-01-18 RX ADMIN — TICAGRELOR 90 MG: 90 TABLET ORAL at 20:37

## 2025-01-18 RX ADMIN — PANTOPRAZOLE SODIUM 40 MG: 40 TABLET, DELAYED RELEASE ORAL at 06:07

## 2025-01-18 RX ADMIN — ATORVASTATIN CALCIUM 40 MG: 40 TABLET, FILM COATED ORAL at 20:37

## 2025-01-18 RX ADMIN — ASPIRIN 81 MG: 81 TABLET, COATED ORAL at 09:10

## 2025-01-18 ASSESSMENT — PAIN SCALES - GENERAL
PAINLEVEL_OUTOF10: 1
PAINLEVEL_OUTOF10: 1
PAINLEVEL_OUTOF10: 3
PAINLEVEL_OUTOF10: 1
PAINLEVEL_OUTOF10: 0 - NO PAIN
PAINLEVEL_OUTOF10: 0 - NO PAIN
PAINLEVEL_OUTOF10: 7
PAINLEVEL_OUTOF10: 3

## 2025-01-18 ASSESSMENT — PAIN SCALES - WONG BAKER: WONGBAKER_NUMERICALRESPONSE: HURTS WHOLE LOT

## 2025-01-18 ASSESSMENT — PAIN DESCRIPTION - ORIENTATION
ORIENTATION: LEFT;UPPER
ORIENTATION: LEFT

## 2025-01-18 ASSESSMENT — ACTIVITIES OF DAILY LIVING (ADL): LACK_OF_TRANSPORTATION: NO

## 2025-01-18 ASSESSMENT — PAIN - FUNCTIONAL ASSESSMENT
PAIN_FUNCTIONAL_ASSESSMENT: 0-10
PAIN_FUNCTIONAL_ASSESSMENT: 0-10

## 2025-01-18 ASSESSMENT — PAIN DESCRIPTION - LOCATION: LOCATION: CHEST

## 2025-01-18 NOTE — PROGRESS NOTES
"Gael Burnette is a 74 y.o. male on day 1 of admission presenting with NSTEMI (non-ST elevated myocardial infarction) (Multi).    Subjective   Intermittent chest pain especially with activity  Feels better with rest       Objective     Physical Exam    Last Recorded Vitals  Blood pressure 106/68, pulse 65, temperature 36.1 °C (96.9 °F), temperature source Temporal, resp. rate 18, height 1.778 m (5' 10\"), weight 87.1 kg (192 lb 0.3 oz), SpO2 94%.  Intake/Output last 3 Shifts:  General Appearance: AAO x 3,   Skin: skin color pink, warm, and dry; no suspicious rashes or lesions  Eyes : PERRL, EOM's intact  ENT: mucous membranes pink and moist  Neck: normocephalic  Respiratory: lungs clear to auscultation anteriorly; no wheezing, rhonchi, or crackles.   Heart: regular rate and rhythm. telemetry shows sinus rhythm  Abdomen: Nondistended, positive bowel sounds x4, soft,  nontender  Extremities: no edema   Peripheral pulses: normal x4 extremities  Neuro: alert, coherent and conversant, no focal motor deficits    Relevant Results    Scheduled medications  aspirin, 81 mg, oral, Daily  atorvastatin, 40 mg, oral, Nightly  empagliflozin, 25 mg, oral, Daily  enoxaparin, 1 mg/kg, subcutaneous, BID  insulin lispro, 0-5 Units, subcutaneous, TID AC  levothyroxine, 112 mcg, oral, Daily  lisinopril, 5 mg, oral, Daily  metoprolol succinate XL, 50 mg, oral, Daily  pantoprazole, 40 mg, oral, Daily before breakfast  polyethylene glycol, 17 g, oral, Daily  tamsulosin, 0.4 mg, oral, Daily  ticagrelor, 90 mg, oral, BID      Continuous medications     PRN medications  PRN medications: acetaminophen **OR** acetaminophen **OR** acetaminophen, acetaminophen **OR** acetaminophen **OR** acetaminophen, dextrose, dextrose, glucagon, glucagon, morphine, morphine, ondansetron ODT **OR** ondansetron    Results for orders placed or performed during the hospital encounter of 01/17/25 (from the past 24 hours)   POCT GLUCOSE   Result Value Ref Range    " POCT Glucose 198 (H) 74 - 99 mg/dL   CBC   Result Value Ref Range    WBC 11.0 4.4 - 11.3 x10*3/uL    nRBC 0.0 0.0 - 0.0 /100 WBCs    RBC 5.28 4.50 - 5.90 x10*6/uL    Hemoglobin 16.7 13.5 - 17.5 g/dL    Hematocrit 49.4 41.0 - 52.0 %    MCV 94 80 - 100 fL    MCH 31.6 26.0 - 34.0 pg    MCHC 33.8 32.0 - 36.0 g/dL    RDW 12.4 11.5 - 14.5 %    Platelets 257 150 - 450 x10*3/uL   Comprehensive metabolic panel   Result Value Ref Range    Glucose 172 (H) 74 - 99 mg/dL    Sodium 139 136 - 145 mmol/L    Potassium 4.3 3.5 - 5.3 mmol/L    Chloride 103 98 - 107 mmol/L    Bicarbonate 29 21 - 32 mmol/L    Anion Gap 11 10 - 20 mmol/L    Urea Nitrogen 26 (H) 6 - 23 mg/dL    Creatinine 1.26 0.50 - 1.30 mg/dL    eGFR 61 >60 mL/min/1.73m*2    Calcium 9.9 8.6 - 10.3 mg/dL    Albumin 4.1 3.4 - 5.0 g/dL    Alkaline Phosphatase 62 33 - 136 U/L    Total Protein 6.4 6.4 - 8.2 g/dL    AST 30 9 - 39 U/L    Bilirubin, Total 0.6 0.0 - 1.2 mg/dL    ALT 20 10 - 52 U/L   POCT GLUCOSE   Result Value Ref Range    POCT Glucose 161 (H) 74 - 99 mg/dL   POCT GLUCOSE   Result Value Ref Range    POCT Glucose 214 (H) 74 - 99 mg/dL   Troponin I, High Sensitivity   Result Value Ref Range    Troponin I, High Sensitivity 2,000 (HH) 0 - 20 ng/L   POCT GLUCOSE   Result Value Ref Range    POCT Glucose 172 (H) 74 - 99 mg/dL       ECG 12 lead    Result Date: 1/17/2025  Normal sinus rhythm Nonspecific T wave abnormality Abnormal ECG When compared with ECG of 17-JAN-2025 10:43, (unconfirmed) Inverted T waves have replaced nonspecific T wave abnormality in Anterior leads Confirmed by Dejon Umaña ([id number]) on 1/17/2025 1:01:46 PM    XR chest 1 view    Result Date: 1/17/2025  STUDY: Chest Radiograph;  1/17/25 at 11:08 AM INDICATION: Chest pain. COMPARISON: None available. ACCESSION NUMBER(S): [id number] ORDERING CLINICIAN: UMER SCHMITZ TECHNIQUE:  Frontal chest was obtained at 1103 hours. FINDINGS: No acute opacities or effusions are visualized.  Heart size is  within normal limits.  There is no evidence of a pneumothorax.    No acute findings on single AP view of the chest. Signed by Patrick Baum MD                           Assessment/Plan   Assessment & Plan  NSTEMI (non-ST elevated myocardial infarction) (Multi)      74-year-old male with history of  Coronary artery disease status post stents  Hypertension  Hyperlipidemia    diabetes mellitus type 2  Hypothyroidism  BPH    Presented with  NSTEMI  History of coronary artery disease status post PCI to LAD/ramus and first OM  Plan  Continue cardiopulmonary monitoring  Troponins peaking  Cardiology on board  Planning cardiac catheterization on Monday  Continue dual antiplatelets  On aspirin and Brilinta  Also on Lovenox per ACS protocol  High intensity statins  Beta-blockers  Keep on metoprolol 50 Mg daily  Lisinopril 5 Mg daily as pressure allows  Synthroid 112 mcg daily  Daily CBC BMP follow vitals  Monitor fingerstick  Insulin sliding scale  Carb controlled diet  Supportive care and symptomatic management  Education counseling  Pain control with morphine IV as deemed appropriate  Flomax for BPH  DVT prophylaxis addressed                                     Janet Lane MD

## 2025-01-18 NOTE — H&P
History Of Present Illness  Gael Burnette is a 74 y.o. male presenting with left-sided chest pain radiating to left arm but no dyspnea.  Patient has significant coronary artery disease with PCI to LAD and OM 9/2024, hypertension, diabetes type 2, hyperlipidemia.  Denies nausea vomiting, palpitations syncope or lightheadedness or worsening.  No fatigue or tiredness.  Symptoms getting worse for the last couple of weeks and experienced chest pain similar to the one he described in the past when he had stents.  Exertional nature of symptoms and chest discomfort.  Symptoms brought on with lifting anything heavy or with activity.  Severe chest discomfort relieved with nitroglycerin.  EKG sinus rhythm with incomplete right bundle branch block.  Troponin 279-310.  BNP 54.  Cardiology consulted.  Anticoagulation started.  Patient does not seem to be totally compliant with antiplatelets and has missed doses off-and-on.  No other complaints.  Past Medical History  Past Medical History:   Diagnosis Date    Coronary artery disease 09/2024    Diabetes mellitus (Multi) 2003    Disease of thyroid gland     Hyperlipidemia 2007    Hypertension 2007   Coronary artery disease status post stents  Hypertension  Hyperlipidemia   diabetes mellitus type 2  Hypothyroidism  BPH    Surgical History  Past Surgical History:   Procedure Laterality Date    CARDIAC CATHETERIZATION N/A 9/10/2024    Procedure: Left Heart Cath, With LV;  Surgeon: Tyshawn Dixon MD;  Location: [facility] Cardiac Cath Lab;  Service: Cardiovascular;  Laterality: N/A;    CARDIAC CATHETERIZATION N/A 9/10/2024    Procedure: PCI XOCHILT Stent- Coronary;  Surgeon: Tyshawn Dixon MD;  Location: [facility] Cardiac Cath Lab;  Service: Cardiovascular;  Laterality: N/A;    CARDIAC CATHETERIZATION N/A 9/10/2024    Procedure: IVUS - Coronary;  Surgeon: Tyshawn Dixon MD;  Location: [facility] Cardiac Cath Lab;  Service: Cardiovascular;  Laterality: N/A;    CARDIAC  This nurse asked patient to come into 2nd triage room for IV and blood work. Asked patient to lift his shirt to check for EKG stickers. Patient lifted his shirt and stickers were there. Told patient he could put his shirt back down. Patient then remarked to this nurse, \"You like that don't you\". This nurse did not respond. Patient stated two more times, \"You like what you see, don't you?\". Responded to patient telling him to not continue to make inappropriate comments. Patient cooperative and appropriate for IV and blood work. Triage nurse made aware.    "CATHETERIZATION N/A 9/16/2024    Procedure: PCI XOCHILT Stent- Coronary;  Surgeon: Tyshawn Dixon MD;  Location: [facility] Cardiac Cath Lab;  Service: Cardiovascular;  Laterality: N/A;        Social History  He reports that he quit smoking about 43 years ago. His smoking use included cigarettes. He started smoking about 55 years ago. He has a 17.1 pack-year smoking history. He has never used smokeless tobacco. He reports that he does not drink alcohol and does not use drugs.    Family History  Family History   Problem Relation Name Age of Onset    Heart disease Father Selvin Burnette     Heart attack Brother Yosef STALLWORTH Yomaira     Heart disease Other brother         Allergies  Patient has no known allergies.    Review of Systems  All other 12 point review of systems negative except HPI  Physical Exam   General Appearance: AAO x 3,   Skin: skin color pink, warm, and dry; no suspicious rashes or lesions  Eyes : PERRL, EOM's intact  ENT: mucous membranes pink and moist  Neck: normocephalic  Respiratory: lungs clear to auscultation anteriorly; no wheezing, rhonchi, or crackles.   Heart: regular rate and rhythm. telemetry shows sinus rhythm  Abdomen: Nondistended, positive bowel sounds x4, soft,  nontender  Extremities: no edema   Peripheral pulses: normal x4 extremities  Neuro: alert, coherent and conversant, no focal motor deficits  Last Recorded Vitals  Blood pressure 106/63, pulse 69, temperature 37.5 °C (99.5 °F), temperature source Temporal, resp. rate 16, height 1.778 m (5' 10\"), weight 87.1 kg (192 lb 0.3 oz), SpO2 97%.    Relevant Results  Scheduled medications  [START ON 1/18/2025] aspirin, 81 mg, oral, Daily  atorvastatin, 40 mg, oral, Nightly  [START ON 1/18/2025] empagliflozin, 25 mg, oral, Daily  enoxaparin, 1 mg/kg, subcutaneous, BID  [START ON 1/18/2025] levothyroxine, 112 mcg, oral, Daily  [START ON 1/18/2025] lisinopril, 5 mg, oral, Daily  [START ON 1/18/2025] metoprolol succinate XL, 50 mg, oral, " Daily  [START ON 1/18/2025] pantoprazole, 40 mg, oral, Daily before breakfast  polyethylene glycol, 17 g, oral, Daily  [START ON 1/18/2025] tamsulosin, 0.4 mg, oral, Daily  ticagrelor, 90 mg, oral, BID      Continuous medications     PRN medications  PRN medications: acetaminophen **OR** acetaminophen **OR** acetaminophen, acetaminophen **OR** acetaminophen **OR** acetaminophen, ondansetron ODT **OR** ondansetron    Results for orders placed or performed during the hospital encounter of 01/17/25 (from the past 24 hours)   CBC and Auto Differential   Result Value Ref Range    WBC 8.0 4.4 - 11.3 x10*3/uL    nRBC 0.0 0.0 - 0.0 /100 WBCs    RBC 4.90 4.50 - 5.90 x10*6/uL    Hemoglobin 15.3 13.5 - 17.5 g/dL    Hematocrit 45.7 41.0 - 52.0 %    MCV 93 80 - 100 fL    MCH 31.2 26.0 - 34.0 pg    MCHC 33.5 32.0 - 36.0 g/dL    RDW 12.3 11.5 - 14.5 %    Platelets 220 150 - 450 x10*3/uL    Neutrophils % 65.8 40.0 - 80.0 %    Immature Granulocytes %, Automated 0.3 0.0 - 0.9 %    Lymphocytes % 23.8 13.0 - 44.0 %    Monocytes % 6.1 2.0 - 10.0 %    Eosinophils % 3.5 0.0 - 6.0 %    Basophils % 0.5 0.0 - 2.0 %    Neutrophils Absolute 5.24 1.60 - 5.50 x10*3/uL    Immature Granulocytes Absolute, Automated 0.02 0.00 - 0.50 x10*3/uL    Lymphocytes Absolute 1.90 0.80 - 3.00 x10*3/uL    Monocytes Absolute 0.49 0.05 - 0.80 x10*3/uL    Eosinophils Absolute 0.28 0.00 - 0.40 x10*3/uL    Basophils Absolute 0.04 0.00 - 0.10 x10*3/uL   Comprehensive Metabolic Panel   Result Value Ref Range    Glucose 276 (H) 74 - 99 mg/dL    Sodium 133 (L) 136 - 145 mmol/L    Potassium 4.6 3.5 - 5.3 mmol/L    Chloride 100 98 - 107 mmol/L    Bicarbonate 24 21 - 32 mmol/L    Anion Gap 14 10 - 20 mmol/L    Urea Nitrogen 20 6 - 23 mg/dL    Creatinine 1.07 0.50 - 1.30 mg/dL    eGFR 74 >60 mL/min/1.73m*2    Calcium 9.0 8.6 - 10.3 mg/dL    Albumin 3.8 3.4 - 5.0 g/dL    Alkaline Phosphatase 58 33 - 136 U/L    Total Protein 5.8 (L) 6.4 - 8.2 g/dL    AST 18 9 - 39 U/L     Bilirubin, Total 0.6 0.0 - 1.2 mg/dL    ALT 18 10 - 52 U/L   Magnesium   Result Value Ref Range    Magnesium 1.96 1.60 - 2.40 mg/dL   Troponin I, High Sensitivity, Initial   Result Value Ref Range    Troponin I, High Sensitivity 279 (HH) 0 - 20 ng/L   Troponin, High Sensitivity, 1 Hour   Result Value Ref Range    Troponin I, High Sensitivity 310 (HH) 0 - 20 ng/L   ECG 12 lead   Result Value Ref Range    Ventricular Rate 66 BPM    Atrial Rate 66 BPM    CA Interval 198 ms    QRS Duration 110 ms    QT Interval 418 ms    QTC Calculation(Bazett) 438 ms    P Axis 62 degrees    R Axis -20 degrees    T Axis 61 degrees    QRS Count 11 beats    Q Onset 226 ms    P Onset 127 ms    P Offset 186 ms    T Offset 435 ms    QTC Fredericia 431 ms   aPTT - baseline   Result Value Ref Range    aPTT 30 27 - 38 seconds       ECG 12 lead    Result Date: 1/17/2025  Normal sinus rhythm Nonspecific T wave abnormality Abnormal ECG When compared with ECG of 17-JAN-2025 10:43, (unconfirmed) Inverted T waves have replaced nonspecific T wave abnormality in Anterior leads Confirmed by Dejon Umaña ([id number]) on 1/17/2025 1:01:46 PM    XR chest 1 view    Result Date: 1/17/2025  STUDY: Chest Radiograph;  1/17/25 at 11:08 AM INDICATION: Chest pain. COMPARISON: None available. ACCESSION NUMBER(S): [id number] ORDERING CLINICIAN: UMER SCHMITZ TECHNIQUE:  Frontal chest was obtained at 1103 hours. FINDINGS: No acute opacities or effusions are visualized.  Heart size is within normal limits.  There is no evidence of a pneumothorax.    No acute findings on single AP view of the chest. Signed by Patrick Baum MD            Assessment/Plan   Assessment & Plan  NSTEMI (non-ST elevated myocardial infarction) (Multi)        74-year-old male with history of    Coronary artery disease status post stents  Hypertension  Hyperlipidemia   diabetes mellitus type 2  Hypothyroidism  BPH    Presenting with  NSTEMI, history of coronary artery disease status post  PCI to LAD/ramus and first OM  Cardiopulmonary monitor  Trend troponins  Cardiology following and report from previous cath as follows   Left Heart Catheterization (09/10/2024):  Double vessel Coronary Artery Disease  Mid LAD 95% calcified lesion - bifurcation with 1st Dg (70% ostial lesion) and 2nd Dg (50% ostial lesion)   Mid 1st OM 90% calcified lesion  RCA with irregularities  Preserved LV systolic function  S/P Successful PCI to mid LAD (bifurcation with 1st and 2nd Dg) using one XOCHILT, guided by IVUS  - Left Heart Catheterization (09/10/2024):  Patent stent to mid LAD  Mid 1st OM 90% calcified lesion  Prox-mid Ramus 95% diffuse lesion  S/P Successful PCI to prox-mid ramus using one XOCHILT, guided by IVUS  S/P Successful PCI to mid 1st OM (bifurcation lesion) using one XOCHILT, guided by IVUS  Dual antiplatelets  Aspirin and Brilinta  Lovenox 1 Mg per KG SQ twice daily  Plan for cardiac catheterization on Monday to evaluate further with high risk features and clinical history  High intensity statins  Beta-blockers  Keep on lisinopril 5 Mg daily, metoprolol  Metoprolol 50 Mg daily  On Synthroid 112 mcg daily  Daily CBC BMP and follow vitals  Monitor fingerstick  Insulin sliding scale  Heart healthy carb controlled diet  Pain control with morphine IV as deemed appropriate  Flomax  DVT prophylaxis addressed  Continue current medicines and home medicines as reconciled  Supportive care symptomatic management  Education counseling               Janet Lane MD

## 2025-01-18 NOTE — PROGRESS NOTES
Subjective Data:  Patient reports feeling well, no new adverse events overnight. Patient denies any chest pain, shortness of breath, palpitations, dizziness or syncope. Patient is hemodynamically stable. His chest pain has improved.    Overnight Events:    No     Objective Data:  Last Recorded Vitals:  Vitals:    01/18/25 0017 01/18/25 0300 01/18/25 0733 01/18/25 0910   BP: 131/75 117/70 124/74 151/85   BP Location: Right arm Right arm Right arm    Patient Position: Lying Lying Lying    Pulse: 72 74 69 70   Resp: 16 16 18    Temp: 36.8 °C (98.2 °F) 36.3 °C (97.4 °F) 36.1 °C (97 °F)    TempSrc: Temporal Temporal Temporal    SpO2: 94% 94% 94%    Weight:       Height:           Last Labs:  CBC - 1/18/2025:  4:08 AM  11.0 16.7 257    49.4      CMP - 1/18/2025:  4:08 AM  9.9 6.4 30 --- 0.6   _ 4.1 20 62      PTT - 1/17/2025:  1:31 PM  0.9   10.7 30     TROPHS   Date/Time Value Ref Range Status   01/17/2025 11:54  0 - 20 ng/L Final     Comment:     Previous result verified on 1/17/2025 1128 on specimen/case [id number] called with component [facility] [[email or web address]] for procedure Troponin I, High Sensitivity, Initial with value 279 ng/L.   01/17/2025 10:52  0 - 20 ng/L Final   01/13/2025 04:40 PM 7 0 - 20 ng/L Final     BNP   Date/Time Value Ref Range Status   09/09/2024 11:07 AM 54 0 - 99 pg/mL Final     HGBA1C   Date/Time Value Ref Range Status   01/07/2025 10:21 AM 7.0 4.3 - 5.6 % Final     Comment:     American Diabetes Association guidelines indicate that patients with HgbA1c in the range 5.7-6.4% are at increased risk for development of diabetes, and intervention by lifestyle modification may be beneficial. HgbA1c greater or equal to 6.5% is considered diagnostic of diabetes.   09/25/2024 01:28 PM 7.7 4.3 - 5.6 % Final     Comment:     American Diabetes Association guidelines indicate that patients with HgbA1c in the range 5.7-6.4% are at increased risk for development of diabetes, and intervention by lifestyle  modification may be beneficial. HgbA1c greater or equal to 6.5% is considered diagnostic of diabetes.     LDLCALC   Date/Time Value Ref Range Status   09/09/2024 02:50 PM 59 <=99 mg/dL Final     Comment:                                 Near   Borderline      AGE      Desirable  Optimal    High     High     Very High     0-19 Y     0 - 109     ---    110-129   >/= 130     ----    20-24 Y     0 - 119     ---    120-159   >/= 160     ----      >24 Y     0 -  99   100-129  130-159   160-189     >/=190       VLDL   Date/Time Value Ref Range Status   09/09/2024 02:50 PM 60 0 - 40 mg/dL Final      Last I/O:  No intake/output data recorded.    Past Cardiology Tests (Last 3 Years):  EKG:  ECG 12 lead 01/17/2025 (Wet Read)      ECG 12 lead 01/17/2025      ECG 12 lead (Clinic Performed) 01/14/2025      ECG 12 lead 01/13/2025 (Preliminary)      ECG 12 lead 01/13/2025 (Preliminary)      ECG 12 lead (Ancillary Performed) 01/08/2025      Electrocardiogram 12 Lead 09/16/2024      ECG 12 lead 09/14/2024      ECG 12 Lead 09/10/2024      ECG 12 lead 09/09/2024 (Wet Read)      ECG 12 lead 09/09/2024    Echo:  Transthoracic Echo (TTE) Complete 09/10/2024    Ejection Fractions:  EF   Date/Time Value Ref Range Status   09/10/2024 07:37 AM 60 %      Cath:  Cardiac Catheterization Procedure 09/16/2024      Cardiac Catheterization Procedure 09/10/2024    Stress Test:  No results found for this or any previous visit from the past 1095 days.    Cardiac Imaging:  No results found for this or any previous visit from the past 1095 days.      Inpatient Medications:  Scheduled medications   Medication Dose Route Frequency    aspirin  81 mg oral Daily    atorvastatin  40 mg oral Nightly    empagliflozin  25 mg oral Daily    enoxaparin  1 mg/kg subcutaneous BID    insulin lispro  0-5 Units subcutaneous TID AC    levothyroxine  112 mcg oral Daily    lisinopril  5 mg oral Daily    metoprolol succinate XL  50 mg oral Daily    pantoprazole  40 mg oral  Daily before breakfast    polyethylene glycol  17 g oral Daily    tamsulosin  0.4 mg oral Daily    ticagrelor  90 mg oral BID     PRN medications   Medication    acetaminophen    Or    acetaminophen    Or    acetaminophen    acetaminophen    Or    acetaminophen    Or    acetaminophen    dextrose    dextrose    glucagon    glucagon    morphine    morphine    ondansetron ODT    Or    ondansetron     Continuous Medications   Medication Dose Last Rate     Physical Exam:  General: alert, oriented and in no acute distress  HEENT: NC/AT; EOMI; PERRLA, external ear is normal  Neck: supple; trachea midline; no masses; no JVD  Chest: clear breath sounds bilaterally; no wheezing  Cardio: regular rhythm, S1S2 normal, no murmurs  Abdomen: Soft, non-tender, non-distension, no organomegaly  Extremities: no clubbing/cyanosis/edema  Neuro: Grossly intact     Psychiatric: Normal mood and affect      Assessment/Plan     Mr. Gael Burnette is a 74 y.o. former smoker diabetic male with prior medical history significant for CAD S/P PCI to LAD and OM (09/2024), hypertension, diabetes mellitus, hyperlipidemia, hypothyroidism. He presented to ED [facility] on 01/17/2025 complaining of chest pain. He explained that for the past weeks he have been experiencing chest pressure similar to the one that he had before the stent. The pain gets worse on exertion, especially lifting weights with his left arm, and improves by resting. He even went to ER before due to this pain. The pain increased significantly on 01/17/2025 and he returned to the hospital. He endorsed a severe chest pain for some minutes that improved with Nitroglycerin. Also had chest pain while walking from the bathroom to the bed. He denies shortness of breath, palpitations, leg edema, lightheadedness, headaches, fever, chills, orthopnea, paroxysmal nocturnal dyspnea or syncope. Notably, he had been missing many times his morning medications (forgot 12 times). EKG showed sinus  rhythm with incomplete RBBB and non-specific ST-T changes. Troponin 279 - 310. BNP 54. ACS measurements have been started in the ER. He has been admitted for clinical compensation.     Assessment     # NSTEMI / CAD S/P PCI to LAD / Ramus and 1st OM  - Troponin 279 - 310.   - BNP 54.   - Progressively worsening chest pain even while resting.  - EKG showed sinus rhythm with non-specific ST-T changes.   - Left Heart Catheterization (09/10/2024):  Double vessel Coronary Artery Disease  Mid LAD 95% calcified lesion - bifurcation with 1st Dg (70% ostial lesion) and 2nd Dg (50% ostial lesion)   Mid 1st OM 90% calcified lesion  RCA with irregularities  Preserved LV systolic function  S/P Successful PCI to mid LAD (bifurcation with 1st and 2nd Dg) using one XOCHILT, guided by IVUS  - Left Heart Catheterization (09/10/2024):  Patent stent to mid LAD  Mid 1st OM 90% calcified lesion  Prox-mid Ramus 95% diffuse lesion  S/P Successful PCI to prox-mid ramus using one XOCHILT, guided by IVUS  S/P Successful PCI to mid 1st OM (bifurcation lesion) using one XOCHILT, guided by IVUS  - Patient with known atherosclerotic disease, implying significant increased risk for major adverse cardiac events.  - We had a thorough conversation with the patient about the natural history of atherosclerosis and the importance for commitment with healthy lifestyle, including but not limited to healthy diet, regular exercises, avoid sedentary and compliance to medication.   - We will request echocardiogram and Left Heart Catheterization.  - The natural history of atherosclerosis was discussed with the patient. The treatment options including GDMT, percutaneous intervention or surgical intervention were discussed with the patient. All the risks, benefits and alternative procedures were discussed. Complications of the procedure were also discussed, including but not limited to risk of bleeding, stroke, infarct, infection, urgent cardiac surgery or death. All  questions were answered and the informed consent was obtained. After aforementioned testing and consultation, Left Heart Catheterization with potential Percutaneous Coronary Intervention would be a reasonable approach if the patient is candidate.  - Please keep NPO on Sunday 01/19 after midnight for potential procedure on Monday 01/20 by Morning.  - Trend troponin.  - EKG daily.  - Would suggest to keep Heparinization, ASA, Ticagrelor, high intensity statin, beta-blocker.  - Follow up with Cardiology team upon discharge.     # Hypertension  - Controlled blood pressure.  - Keep current medications including lisinopril 5mg daily.  - Patient counseled to keep a healthy lifestyle including regular exercise and low-sodium diet.  - Recommended home blood pressure monitoring.  - Goal of BP < 130/80mmHg.     # Diabetes  - Counseled on healthy diet and regular exercises.  - Discussed need for weight loss and the benefits.   - Keep current medications including empaglifozin, glipizide.     # Hyperlipidemia  - Keep home medication with Atorvastatin 40mg daily.  - Counseled on healthy diet and regular exercise.      # Hypothyroidism  - Keep Levothyroxine 112mcg daily     Thank you for allowing me to participate in the care of this patient. Please reach me out if you have any questions or if you need any clarifications regarding the patient's care.     Peripheral IV 01/17/25 20 G Left;Posterior Hand (Active)   Site Assessment Clean;Dry 01/18/25 0900   Dressing Status Clean;Dry 01/18/25 0900   Number of days: 1     Code Status:  Full Code    Tyshawn Dixon MD  Cardiology

## 2025-01-18 NOTE — PROGRESS NOTES
Pt reviewed in care rounds this morning. Pt not medically ready for discharge. No discharge anticipated before Monday (1/20). Pt scheduled for a heart cath on Monday. SW met w/ Pt at bedside. SW explained the role of care transitions and completed initial assessment. SW confirmed Pt's demographics accurate per Pt's chart. Assessed for social needs and none noted. Pt independent w/ ADLs/IADLs. PCP is Dr. Rivera. Plan is for Pt to discharge to home, no need for additional supports or services.       01/18/25 8867   Discharge Planning   Living Arrangements Spouse/significant other   Support Systems Spouse/significant other;Children   Assistance Needed Independent   Type of Residence Private residence   Number of Stairs to Enter Residence 4   Number of Stairs Within Residence 0  (flight of stairs to basement)   Do you have animals or pets at home? No   Who is requesting discharge planning? Provider   Home or Post Acute Services None   Does the patient need discharge transport arranged? No   Financial Resource Strain   How hard is it for you to pay for the very basics like food, housing, medical care, and heating? Not hard   Housing Stability   In the last 12 months, was there a time when you were not able to pay the mortgage or rent on time? N   In the past 12 months, how many times have you moved where you were living? 0   At any time in the past 12 months, were you homeless or living in a shelter (including now)? N   Transportation Needs   In the past 12 months, has lack of transportation kept you from medical appointments or from getting medications? no   In the past 12 months, has lack of transportation kept you from meetings, work, or from getting things needed for daily living? No   Stroke Family Assessment   Stroke Family Assessment Needed No   Intensity of Service   Intensity of Service 0-30 min

## 2025-01-19 ENCOUNTER — APPOINTMENT (OUTPATIENT)
Dept: CARDIOLOGY | Facility: HOSPITAL | Age: 75
DRG: 321 | End: 2025-01-19
Payer: MEDICARE

## 2025-01-19 VITALS
TEMPERATURE: 97.3 F | OXYGEN SATURATION: 93 % | SYSTOLIC BLOOD PRESSURE: 94 MMHG | DIASTOLIC BLOOD PRESSURE: 61 MMHG | BODY MASS INDEX: 27.49 KG/M2 | WEIGHT: 192.02 LBS | HEIGHT: 70 IN | RESPIRATION RATE: 18 BRPM | HEART RATE: 63 BPM

## 2025-01-19 LAB
ANION GAP SERPL CALC-SCNC: 10 MMOL/L (ref 10–20)
BUN SERPL-MCNC: 22 MG/DL (ref 6–23)
CALCIUM SERPL-MCNC: 9.5 MG/DL (ref 8.6–10.3)
CHLORIDE SERPL-SCNC: 103 MMOL/L (ref 98–107)
CO2 SERPL-SCNC: 27 MMOL/L (ref 21–32)
CREAT SERPL-MCNC: 0.92 MG/DL (ref 0.5–1.3)
EGFRCR SERPLBLD CKD-EPI 2021: 88 ML/MIN/1.73M*2
ERYTHROCYTE [DISTWIDTH] IN BLOOD BY AUTOMATED COUNT: 12.5 % (ref 11.5–14.5)
GLUCOSE BLD MANUAL STRIP-MCNC: 146 MG/DL (ref 74–99)
GLUCOSE BLD MANUAL STRIP-MCNC: 209 MG/DL (ref 74–99)
GLUCOSE BLD MANUAL STRIP-MCNC: 210 MG/DL (ref 74–99)
GLUCOSE BLD MANUAL STRIP-MCNC: 216 MG/DL (ref 74–99)
GLUCOSE SERPL-MCNC: 170 MG/DL (ref 74–99)
HCT VFR BLD AUTO: 50.3 % (ref 41–52)
HGB BLD-MCNC: 16.8 G/DL (ref 13.5–17.5)
MCH RBC QN AUTO: 31.2 PG (ref 26–34)
MCHC RBC AUTO-ENTMCNC: 33.4 G/DL (ref 32–36)
MCV RBC AUTO: 94 FL (ref 80–100)
NRBC BLD-RTO: 0 /100 WBCS (ref 0–0)
PLATELET # BLD AUTO: 237 X10*3/UL (ref 150–450)
POTASSIUM SERPL-SCNC: 4.3 MMOL/L (ref 3.5–5.3)
RBC # BLD AUTO: 5.38 X10*6/UL (ref 4.5–5.9)
SODIUM SERPL-SCNC: 136 MMOL/L (ref 136–145)
WBC # BLD AUTO: 9.9 X10*3/UL (ref 4.4–11.3)

## 2025-01-19 PROCEDURE — 2500000001 HC RX 250 WO HCPCS SELF ADMINISTERED DRUGS (ALT 637 FOR MEDICARE OP): Performed by: HOSPITALIST

## 2025-01-19 PROCEDURE — 82947 ASSAY GLUCOSE BLOOD QUANT: CPT

## 2025-01-19 PROCEDURE — 1200000002 HC GENERAL ROOM WITH TELEMETRY DAILY

## 2025-01-19 PROCEDURE — 82374 ASSAY BLOOD CARBON DIOXIDE: CPT | Performed by: HOSPITALIST

## 2025-01-19 PROCEDURE — 36415 COLL VENOUS BLD VENIPUNCTURE: CPT | Performed by: HOSPITALIST

## 2025-01-19 PROCEDURE — 85027 COMPLETE CBC AUTOMATED: CPT | Performed by: HOSPITALIST

## 2025-01-19 PROCEDURE — 2500000004 HC RX 250 GENERAL PHARMACY W/ HCPCS (ALT 636 FOR OP/ED): Performed by: HOSPITALIST

## 2025-01-19 PROCEDURE — 99233 SBSQ HOSP IP/OBS HIGH 50: CPT | Performed by: HOSPITALIST

## 2025-01-19 PROCEDURE — 1100000001 HC PRIVATE ROOM DAILY

## 2025-01-19 PROCEDURE — 93005 ELECTROCARDIOGRAM TRACING: CPT

## 2025-01-19 PROCEDURE — 2500000002 HC RX 250 W HCPCS SELF ADMINISTERED DRUGS (ALT 637 FOR MEDICARE OP, ALT 636 FOR OP/ED): Performed by: HOSPITALIST

## 2025-01-19 RX ADMIN — LEVOTHYROXINE SODIUM 112 MCG: 0.11 TABLET ORAL at 05:05

## 2025-01-19 RX ADMIN — ATORVASTATIN CALCIUM 40 MG: 40 TABLET, FILM COATED ORAL at 20:27

## 2025-01-19 RX ADMIN — METOPROLOL SUCCINATE 50 MG: 50 TABLET, EXTENDED RELEASE ORAL at 09:00

## 2025-01-19 RX ADMIN — INSULIN LISPRO 2 UNITS: 100 INJECTION, SOLUTION INTRAVENOUS; SUBCUTANEOUS at 17:46

## 2025-01-19 RX ADMIN — TICAGRELOR 90 MG: 90 TABLET ORAL at 09:00

## 2025-01-19 RX ADMIN — ENOXAPARIN SODIUM 90 MG: 100 INJECTION SUBCUTANEOUS at 01:14

## 2025-01-19 RX ADMIN — TAMSULOSIN HYDROCHLORIDE 0.4 MG: 0.4 CAPSULE ORAL at 09:01

## 2025-01-19 RX ADMIN — ASPIRIN 81 MG: 81 TABLET, COATED ORAL at 09:00

## 2025-01-19 RX ADMIN — PANTOPRAZOLE SODIUM 40 MG: 40 TABLET, DELAYED RELEASE ORAL at 05:05

## 2025-01-19 RX ADMIN — ENOXAPARIN SODIUM 90 MG: 100 INJECTION SUBCUTANEOUS at 15:25

## 2025-01-19 RX ADMIN — LISINOPRIL 5 MG: 5 TABLET ORAL at 09:01

## 2025-01-19 RX ADMIN — EMPAGLIFLOZIN 25 MG: 25 TABLET, FILM COATED ORAL at 09:01

## 2025-01-19 RX ADMIN — TICAGRELOR 90 MG: 90 TABLET ORAL at 20:27

## 2025-01-19 RX ADMIN — INSULIN LISPRO 2 UNITS: 100 INJECTION, SOLUTION INTRAVENOUS; SUBCUTANEOUS at 12:29

## 2025-01-19 ASSESSMENT — COGNITIVE AND FUNCTIONAL STATUS - GENERAL
DAILY ACTIVITIY SCORE: 23
HELP NEEDED FOR BATHING: A LITTLE
CLIMB 3 TO 5 STEPS WITH RAILING: A LITTLE
MOBILITY SCORE: 23

## 2025-01-19 ASSESSMENT — PAIN SCALES - GENERAL
PAINLEVEL_OUTOF10: 0 - NO PAIN

## 2025-01-19 ASSESSMENT — PAIN - FUNCTIONAL ASSESSMENT: PAIN_FUNCTIONAL_ASSESSMENT: 0-10

## 2025-01-19 NOTE — PROGRESS NOTES
"Gael Burnette is a 74 y.o. male on day 2 of admission presenting with NSTEMI (non-ST elevated myocardial infarction) (Multi).    Subjective   Chest discomfort occasionally intermittently       Objective     Physical Exam  General Appearance: AAO x 3,   Skin: skin color pink, warm, and dry; no suspicious rashes or lesions  Eyes : PERRL, EOM's intact  ENT: mucous membranes pink and moist  Neck: normocephalic  Respiratory: lungs clear to auscultation anteriorly; no wheezing, rhonchi, or crackles.   Heart: regular rate and rhythm. telemetry shows sinus rhythm  Abdomen: Nondistended, positive bowel sounds x4, soft,  nontender  Extremities: no edema   Peripheral pulses: normal x4 extremities  Neuro: alert, coherent and conversant, no focal motor deficits  Last Recorded Vitals  Blood pressure 88/53, pulse 67, temperature 36.1 °C (97 °F), temperature source Temporal, resp. rate 20, height 1.778 m (5' 10\"), weight 87.1 kg (192 lb 0.3 oz), SpO2 93%.  Intake/Output last 3 Shifts:  I/O last 3 completed shifts:  In: 360 (4.1 mL/kg) [P.O.:360]  Out: - (0 mL/kg)   Weight: 87.1 kg     Relevant Results    Scheduled medications  aspirin, 81 mg, oral, Daily  atorvastatin, 40 mg, oral, Nightly  empagliflozin, 25 mg, oral, Daily  enoxaparin, 1 mg/kg, subcutaneous, BID  insulin lispro, 0-5 Units, subcutaneous, TID AC  levothyroxine, 112 mcg, oral, Daily  lisinopril, 5 mg, oral, Daily  metoprolol succinate XL, 50 mg, oral, Daily  pantoprazole, 40 mg, oral, Daily before breakfast  polyethylene glycol, 17 g, oral, Daily  tamsulosin, 0.4 mg, oral, Daily  ticagrelor, 90 mg, oral, BID      Continuous medications     PRN medications  PRN medications: acetaminophen **OR** acetaminophen **OR** acetaminophen, acetaminophen **OR** acetaminophen **OR** acetaminophen, dextrose, dextrose, glucagon, glucagon, morphine, morphine, ondansetron ODT **OR** ondansetron    Results for orders placed or performed during the hospital encounter of 01/17/25 " (from the past 24 hours)   POCT GLUCOSE   Result Value Ref Range    POCT Glucose 219 (H) 74 - 99 mg/dL   CBC   Result Value Ref Range    WBC 9.9 4.4 - 11.3 x10*3/uL    nRBC 0.0 0.0 - 0.0 /100 WBCs    RBC 5.38 4.50 - 5.90 x10*6/uL    Hemoglobin 16.8 13.5 - 17.5 g/dL    Hematocrit 50.3 41.0 - 52.0 %    MCV 94 80 - 100 fL    MCH 31.2 26.0 - 34.0 pg    MCHC 33.4 32.0 - 36.0 g/dL    RDW 12.5 11.5 - 14.5 %    Platelets 237 150 - 450 x10*3/uL   Basic metabolic panel   Result Value Ref Range    Glucose 170 (H) 74 - 99 mg/dL    Sodium 136 136 - 145 mmol/L    Potassium 4.3 3.5 - 5.3 mmol/L    Chloride 103 98 - 107 mmol/L    Bicarbonate 27 21 - 32 mmol/L    Anion Gap 10 10 - 20 mmol/L    Urea Nitrogen 22 6 - 23 mg/dL    Creatinine 0.92 0.50 - 1.30 mg/dL    eGFR 88 >60 mL/min/1.73m*2    Calcium 9.5 8.6 - 10.3 mg/dL   POCT GLUCOSE   Result Value Ref Range    POCT Glucose 146 (H) 74 - 99 mg/dL   POCT GLUCOSE   Result Value Ref Range    POCT Glucose 216 (H) 74 - 99 mg/dL   POCT GLUCOSE   Result Value Ref Range    POCT Glucose 209 (H) 74 - 99 mg/dL       No results found.                      Assessment/Plan   Assessment & Plan  NSTEMI (non-ST elevated myocardial infarction) (Multi)      74-year-old male with history of  Coronary artery disease status post stents  Hypertension  Hyperlipidemia  Diabetes mellitus type 2  Hypothyroidism  BPH  Presented with  NSTEMI  History of coronary artery disease status post PCI  to LAD/ramus and first OM     Plan  Continue cardiopulmonary monitoring  Troponins trended  Cardiac catheterization tomorrow, cardiology on board  Continue dual antiplatelets on aspirin and Brilinta  High intensity statins  Lovenox per ACS protocol  Beta-blockers on metoprolol 50 Mg daily  Lisinopril 5 Mg daily for hypertension as pressure allows  Synthroid 112 mcg daily  Daily CBC BMP monitoring ST  Insulin sliding scale insulin carb controlled diet  Supportive care symptomatic management  Education counseled  Pain  control with morphine IV as required  Flomax for BPH  DVT prophylaxis addressed                     Janet Lane MD

## 2025-01-19 NOTE — PROGRESS NOTES
Subjective Data:  Patient reports feeling well, no new adverse events overnight. Patient denies any chest pain, shortness of breath, palpitations, dizziness or syncope. Patient is hemodynamically stable. No chest pain today.    Overnight Events:    No     Objective Data:  Last Recorded Vitals:  Vitals:    01/18/25 2100 01/19/25 0744 01/19/25 0900 01/19/25 1537   BP: 94/59 111/71 104/64 88/53   BP Location: Right arm Right arm  Left arm   Patient Position: Lying Lying  Lying   Pulse: 68 66 70 67   Resp: 18 20     Temp: 36.6 °C (97.9 °F) 36 °C (96.8 °F)  36.1 °C (97 °F)   TempSrc: Temporal Temporal  Temporal   SpO2: 93% 93%  93%   Weight:       Height:           Last Labs:  CBC - 1/19/2025:  4:22 AM  9.9 16.8 237    50.3      CMP - 1/19/2025:  4:22 AM  9.5 6.4 30 --- 0.6   _ 4.1 20 62      PTT - 1/17/2025:  1:31 PM  0.9   10.7 30     TROPHS   Date/Time Value Ref Range Status   01/18/2025 12:36 PM 2,000 0 - 20 ng/L Final   01/17/2025 11:54  0 - 20 ng/L Final     Comment:     Previous result verified on 1/17/2025 1128 on specimen/case [id number] called with component [facility] for procedure Troponin I, High Sensitivity, Initial with value 279 ng/L.   01/17/2025 10:52  0 - 20 ng/L Final     BNP   Date/Time Value Ref Range Status   09/09/2024 11:07 AM 54 0 - 99 pg/mL Final     HGBA1C   Date/Time Value Ref Range Status   01/07/2025 10:21 AM 7.0 4.3 - 5.6 % Final     Comment:     American Diabetes Association guidelines indicate that patients with HgbA1c in the range 5.7-6.4% are at increased risk for development of diabetes, and intervention by lifestyle modification may be beneficial. HgbA1c greater or equal to 6.5% is considered diagnostic of diabetes.   09/25/2024 01:28 PM 7.7 4.3 - 5.6 % Final     Comment:     American Diabetes Association guidelines indicate that patients with HgbA1c in the range 5.7-6.4% are at increased risk for development of diabetes, and intervention by lifestyle modification may be  beneficial. HgbA1c greater or equal to 6.5% is considered diagnostic of diabetes.     LDLCALC   Date/Time Value Ref Range Status   09/09/2024 02:50 PM 59 <=99 mg/dL Final     Comment:                                 Near   Borderline      AGE      Desirable  Optimal    High     High     Very High     0-19 Y     0 - 109     ---    110-129   >/= 130     ----    20-24 Y     0 - 119     ---    120-159   >/= 160     ----      >24 Y     0 -  99   100-129  130-159   160-189     >/=190       VLDL   Date/Time Value Ref Range Status   09/09/2024 02:50 PM 60 0 - 40 mg/dL Final      Last I/O:  I/O last 3 completed shifts:  In: 360 (4.1 mL/kg) [P.O.:360]  Out: - (0 mL/kg)   Weight: 87.1 kg     Past Cardiology Tests (Last 3 Years):  EKG:  ECG 12 lead 01/17/2025 (Wet Read)      ECG 12 lead 01/17/2025      ECG 12 lead (Clinic Performed) 01/14/2025      ECG 12 lead 01/13/2025 (Preliminary)      ECG 12 lead 01/13/2025 (Preliminary)      ECG 12 lead (Ancillary Performed) 01/08/2025      Electrocardiogram 12 Lead 09/16/2024      ECG 12 lead 09/14/2024      ECG 12 Lead 09/10/2024      ECG 12 lead 09/09/2024 (Wet Read)      ECG 12 lead 09/09/2024    Echo:  Transthoracic Echo (TTE) Complete 09/10/2024    Ejection Fractions:  EF   Date/Time Value Ref Range Status   09/10/2024 07:37 AM 60 %      Cath:  Cardiac Catheterization Procedure 09/16/2024      Cardiac Catheterization Procedure 09/10/2024    Stress Test:  No results found for this or any previous visit from the past 1095 days.    Cardiac Imaging:  No results found for this or any previous visit from the past 1095 days.      Inpatient Medications:  Scheduled medications   Medication Dose Route Frequency    aspirin  81 mg oral Daily    atorvastatin  40 mg oral Nightly    empagliflozin  25 mg oral Daily    enoxaparin  1 mg/kg subcutaneous BID    insulin lispro  0-5 Units subcutaneous TID AC    levothyroxine  112 mcg oral Daily    lisinopril  5 mg oral Daily    metoprolol succinate XL   50 mg oral Daily    pantoprazole  40 mg oral Daily before breakfast    polyethylene glycol  17 g oral Daily    tamsulosin  0.4 mg oral Daily    ticagrelor  90 mg oral BID     PRN medications   Medication    acetaminophen    Or    acetaminophen    Or    acetaminophen    acetaminophen    Or    acetaminophen    Or    acetaminophen    dextrose    dextrose    glucagon    glucagon    morphine    morphine    ondansetron ODT    Or    ondansetron     Continuous Medications   Medication Dose Last Rate     Physical Exam:  General: alert, oriented and in no acute distress  HEENT: NC/AT; EOMI; PERRLA, external ear is normal  Neck: supple; trachea midline; no masses; no JVD  Chest: clear breath sounds bilaterally; no wheezing  Cardio: regular rhythm, S1S2 normal, no murmurs  Abdomen: Soft, non-tender, non-distension, no organomegaly  Extremities: no clubbing/cyanosis/edema  Neuro: Grossly intact     Psychiatric: Normal mood and affect      Assessment/Plan     Mr. Gael Burnette is a 74 y.o. former smoker diabetic male with prior medical history significant for CAD S/P PCI to LAD and OM (09/2024), hypertension, diabetes mellitus, hyperlipidemia, hypothyroidism. He presented to ED [facility] on 01/17/2025 complaining of chest pain. He explained that for the past weeks he have been experiencing chest pressure similar to the one that he had before the stent. The pain gets worse on exertion, especially lifting weights with his left arm, and improves by resting. He even went to ER before due to this pain. The pain increased significantly on 01/17/2025 and he returned to the hospital. He endorsed a severe chest pain for some minutes that improved with Nitroglycerin. Also had chest pain while walking from the bathroom to the bed. He denies shortness of breath, palpitations, leg edema, lightheadedness, headaches, fever, chills, orthopnea, paroxysmal nocturnal dyspnea or syncope. Notably, he had been missing many times his morning  medications (forgot 12 times). EKG showed sinus rhythm with incomplete RBBB and non-specific ST-T changes. Troponin 279 - 310. BNP 54. ACS measurements have been started in the ER. He has been admitted for clinical compensation.     Assessment     # NSTEMI / CAD S/P PCI to LAD / Ramus and 1st OM  - Troponin 279 - 310 - 2,000.   - BNP 54.   - Progressively worsening chest pain even while resting.  - EKG showed sinus rhythm with non-specific ST-T changes.   - Left Heart Catheterization (09/10/2024):  Double vessel Coronary Artery Disease  Mid LAD 95% calcified lesion - bifurcation with 1st Dg (70% ostial lesion) and 2nd Dg (50% ostial lesion)   Mid 1st OM 90% calcified lesion  RCA with irregularities  Preserved LV systolic function  S/P Successful PCI to mid LAD (bifurcation with 1st and 2nd Dg) using one XOCHILT, guided by IVUS  - Left Heart Catheterization (09/10/2024):  Patent stent to mid LAD  Mid 1st OM 90% calcified lesion  Prox-mid Ramus 95% diffuse lesion  S/P Successful PCI to prox-mid ramus using one XOCHILT, guided by IVUS  S/P Successful PCI to mid 1st OM (bifurcation lesion) using one XOCHILT, guided by IVUS  - Patient with known atherosclerotic disease, implying significant increased risk for major adverse cardiac events.  - We had a thorough conversation with the patient about the natural history of atherosclerosis and the importance for commitment with healthy lifestyle, including but not limited to healthy diet, regular exercises, avoid sedentary and compliance to medication.   - We will request echocardiogram and Left Heart Catheterization.  - The natural history of atherosclerosis was discussed with the patient. The treatment options including GDMT, percutaneous intervention or surgical intervention were discussed with the patient. All the risks, benefits and alternative procedures were discussed. Complications of the procedure were also discussed, including but not limited to risk of bleeding, stroke, infarct,  infection, urgent cardiac surgery or death. All questions were answered and the informed consent was obtained. After aforementioned testing and consultation, Left Heart Catheterization with potential Percutaneous Coronary Intervention would be a reasonable approach if the patient is candidate.  - Please keep NPO on Sunday 01/19 after midnight for potential procedure on Monday 01/20 by Morning.  - Trend troponin.  - EKG daily.  - Would suggest to keep Heparinization, ASA, Ticagrelor, high intensity statin, beta-blocker.  - Echo pending.  - Follow up with Cardiology team upon discharge.     # Hypertension  - Controlled blood pressure.  - Keep current medications including lisinopril 5mg daily.  - Patient counseled to keep a healthy lifestyle including regular exercise and low-sodium diet.  - Recommended home blood pressure monitoring.  - Goal of BP < 130/80mmHg.     # Diabetes  - Counseled on healthy diet and regular exercises.  - Discussed need for weight loss and the benefits.   - Keep current medications including empaglifozin, glipizide.     # Hyperlipidemia  - Keep home medication with Atorvastatin 40mg daily.  - Counseled on healthy diet and regular exercise.      # Hypothyroidism  - Keep Levothyroxine 112mcg daily      Thank you for allowing me to participate in the care of this patient. Please reach me out if you have any questions or if you need any clarifications regarding the patient's care.     Peripheral IV 01/17/25 20 G Left;Posterior Hand (Active)   Site Assessment Clean;Dry 01/19/25 1300   Line Status Saline locked 01/19/25 1300   Dressing Status Clean;Dry 01/19/25 1300   Number of days: 2     Code Status:  Full Code    Tyshawn Dixon MD  Cardiology

## 2025-01-19 NOTE — CARE PLAN
Problem: Diabetes  Goal: Maintain glucose levels >70mg/dl to <250mg/dl throughout shift  Outcome: Progressing   The patient's goals for the shift include No chest pain    The clinical goals for the shift include have no chest pain

## 2025-01-20 ENCOUNTER — APPOINTMENT (OUTPATIENT)
Dept: CARDIAC REHAB | Facility: HOSPITAL | Age: 75
End: 2025-01-20
Payer: MEDICARE

## 2025-01-20 ENCOUNTER — DOCUMENTATION (OUTPATIENT)
Dept: CARDIOLOGY | Facility: HOSPITAL | Age: 75
End: 2025-01-20

## 2025-01-20 ENCOUNTER — APPOINTMENT (OUTPATIENT)
Dept: CARDIOLOGY | Facility: HOSPITAL | Age: 75
DRG: 321 | End: 2025-01-20
Payer: MEDICARE

## 2025-01-20 VITALS
WEIGHT: 192.02 LBS | OXYGEN SATURATION: 98 % | BODY MASS INDEX: 27.49 KG/M2 | TEMPERATURE: 97 F | DIASTOLIC BLOOD PRESSURE: 79 MMHG | RESPIRATION RATE: 18 BRPM | HEART RATE: 64 BPM | HEIGHT: 70 IN | SYSTOLIC BLOOD PRESSURE: 118 MMHG

## 2025-01-20 PROBLEM — I21.4 NSTEMI (NON-ST ELEVATED MYOCARDIAL INFARCTION) (MULTI): Status: RESOLVED | Noted: 2025-01-17 | Resolved: 2025-01-20

## 2025-01-20 LAB
ANION GAP SERPL CALC-SCNC: 10 MMOL/L (ref 10–20)
AORTIC VALVE MEAN GRADIENT: 2 MMHG
AORTIC VALVE PEAK VELOCITY: 1.06 M/S
ATRIAL RATE: 65 BPM
ATRIAL RATE: 67 BPM
ATRIAL RATE: 73 BPM
ATRIAL RATE: 76 BPM
AV PEAK GRADIENT: 4 MMHG
AVA (PEAK VEL): 2.92 CM2
AVA (VTI): 2.8 CM2
BUN SERPL-MCNC: 22 MG/DL (ref 6–23)
CALCIUM SERPL-MCNC: 9.2 MG/DL (ref 8.6–10.3)
CHLORIDE SERPL-SCNC: 104 MMOL/L (ref 98–107)
CO2 SERPL-SCNC: 25 MMOL/L (ref 21–32)
CREAT SERPL-MCNC: 1.08 MG/DL (ref 0.5–1.3)
EGFRCR SERPLBLD CKD-EPI 2021: 73 ML/MIN/1.73M*2
EJECTION FRACTION APICAL 4 CHAMBER: 52.6
EJECTION FRACTION: 38 %
ERYTHROCYTE [DISTWIDTH] IN BLOOD BY AUTOMATED COUNT: 12.4 % (ref 11.5–14.5)
GLUCOSE BLD MANUAL STRIP-MCNC: 156 MG/DL (ref 74–99)
GLUCOSE BLD MANUAL STRIP-MCNC: 196 MG/DL (ref 74–99)
GLUCOSE SERPL-MCNC: 161 MG/DL (ref 74–99)
HCT VFR BLD AUTO: 49.6 % (ref 41–52)
HGB BLD-MCNC: 16.7 G/DL (ref 13.5–17.5)
LEFT ATRIUM VOLUME AREA LENGTH INDEX BSA: 13.7 ML/M2
LEFT VENTRICLE INTERNAL DIMENSION DIASTOLE: 3.49 CM (ref 3.5–6)
LEFT VENTRICULAR OUTFLOW TRACT DIAMETER: 2.1 CM
LV EJECTION FRACTION BIPLANE: 55 %
MCH RBC QN AUTO: 31.6 PG (ref 26–34)
MCHC RBC AUTO-ENTMCNC: 33.7 G/DL (ref 32–36)
MCV RBC AUTO: 94 FL (ref 80–100)
MITRAL VALVE E/A RATIO: 0.79
NRBC BLD-RTO: 0 /100 WBCS (ref 0–0)
P AXIS: 70 DEGREES
P AXIS: 78 DEGREES
P AXIS: 81 DEGREES
P AXIS: 85 DEGREES
P OFFSET: 179 MS
P OFFSET: 187 MS
P OFFSET: 189 MS
P OFFSET: 196 MS
P ONSET: 124 MS
P ONSET: 128 MS
P ONSET: 133 MS
P ONSET: 135 MS
PLATELET # BLD AUTO: 228 X10*3/UL (ref 150–450)
POTASSIUM SERPL-SCNC: 4.3 MMOL/L (ref 3.5–5.3)
PR INTERVAL: 182 MS
PR INTERVAL: 186 MS
PR INTERVAL: 190 MS
PR INTERVAL: 200 MS
Q ONSET: 223 MS
Q ONSET: 224 MS
Q ONSET: 226 MS
Q ONSET: 226 MS
QRS COUNT: 11 BEATS
QRS COUNT: 11 BEATS
QRS COUNT: 12 BEATS
QRS COUNT: 13 BEATS
QRS DURATION: 106 MS
QRS DURATION: 108 MS
QRS DURATION: 112 MS
QRS DURATION: 116 MS
QT INTERVAL: 390 MS
QT INTERVAL: 390 MS
QT INTERVAL: 408 MS
QT INTERVAL: 416 MS
QTC CALCULATION(BAZETT): 429 MS
QTC CALCULATION(BAZETT): 431 MS
QTC CALCULATION(BAZETT): 432 MS
QTC CALCULATION(BAZETT): 438 MS
QTC FREDERICIA: 416 MS
QTC FREDERICIA: 421 MS
QTC FREDERICIA: 423 MS
QTC FREDERICIA: 427 MS
R AXIS: -11 DEGREES
R AXIS: -23 DEGREES
R AXIS: 15 DEGREES
R AXIS: 30 DEGREES
RBC # BLD AUTO: 5.28 X10*6/UL (ref 4.5–5.9)
RIGHT VENTRICLE PEAK SYSTOLIC PRESSURE: 26.8 MMHG
SODIUM SERPL-SCNC: 135 MMOL/L (ref 136–145)
T AXIS: 47 DEGREES
T AXIS: 63 DEGREES
T AXIS: 75 DEGREES
T AXIS: 88 DEGREES
T OFFSET: 418 MS
T OFFSET: 421 MS
T OFFSET: 430 MS
T OFFSET: 432 MS
TRICUSPID ANNULAR PLANE SYSTOLIC EXCURSION: 1.5 CM
VENTRICULAR RATE: 65 BPM
VENTRICULAR RATE: 67 BPM
VENTRICULAR RATE: 73 BPM
VENTRICULAR RATE: 76 BPM
WBC # BLD AUTO: 7.6 X10*3/UL (ref 4.4–11.3)

## 2025-01-20 PROCEDURE — C1753 CATH, INTRAVAS ULTRASOUND: HCPCS | Performed by: STUDENT IN AN ORGANIZED HEALTH CARE EDUCATION/TRAINING PROGRAM

## 2025-01-20 PROCEDURE — 2500000002 HC RX 250 W HCPCS SELF ADMINISTERED DRUGS (ALT 637 FOR MEDICARE OP, ALT 636 FOR OP/ED): Performed by: HOSPITALIST

## 2025-01-20 PROCEDURE — 92978 ENDOLUMINL IVUS OCT C 1ST: CPT | Performed by: STUDENT IN AN ORGANIZED HEALTH CARE EDUCATION/TRAINING PROGRAM

## 2025-01-20 PROCEDURE — 92928 PRQ TCAT PLMT NTRAC ST 1 LES: CPT | Performed by: STUDENT IN AN ORGANIZED HEALTH CARE EDUCATION/TRAINING PROGRAM

## 2025-01-20 PROCEDURE — 93458 L HRT ARTERY/VENTRICLE ANGIO: CPT | Performed by: STUDENT IN AN ORGANIZED HEALTH CARE EDUCATION/TRAINING PROGRAM

## 2025-01-20 PROCEDURE — C1760 CLOSURE DEV, VASC: HCPCS | Performed by: STUDENT IN AN ORGANIZED HEALTH CARE EDUCATION/TRAINING PROGRAM

## 2025-01-20 PROCEDURE — 85027 COMPLETE CBC AUTOMATED: CPT | Performed by: HOSPITALIST

## 2025-01-20 PROCEDURE — 4A023N7 MEASUREMENT OF CARDIAC SAMPLING AND PRESSURE, LEFT HEART, PERCUTANEOUS APPROACH: ICD-10-PCS | Performed by: STUDENT IN AN ORGANIZED HEALTH CARE EDUCATION/TRAINING PROGRAM

## 2025-01-20 PROCEDURE — C9600 PERC DRUG-EL COR STENT SING: HCPCS | Mod: LD | Performed by: STUDENT IN AN ORGANIZED HEALTH CARE EDUCATION/TRAINING PROGRAM

## 2025-01-20 PROCEDURE — 93010 ELECTROCARDIOGRAM REPORT: CPT | Performed by: STUDENT IN AN ORGANIZED HEALTH CARE EDUCATION/TRAINING PROGRAM

## 2025-01-20 PROCEDURE — 99152 MOD SED SAME PHYS/QHP 5/>YRS: CPT | Performed by: STUDENT IN AN ORGANIZED HEALTH CARE EDUCATION/TRAINING PROGRAM

## 2025-01-20 PROCEDURE — 99239 HOSP IP/OBS DSCHRG MGMT >30: CPT | Performed by: HOSPITALIST

## 2025-01-20 PROCEDURE — B2111ZZ FLUOROSCOPY OF MULTIPLE CORONARY ARTERIES USING LOW OSMOLAR CONTRAST: ICD-10-PCS | Performed by: STUDENT IN AN ORGANIZED HEALTH CARE EDUCATION/TRAINING PROGRAM

## 2025-01-20 PROCEDURE — B240ZZ3 ULTRASONOGRAPHY OF SINGLE CORONARY ARTERY, INTRAVASCULAR: ICD-10-PCS | Performed by: STUDENT IN AN ORGANIZED HEALTH CARE EDUCATION/TRAINING PROGRAM

## 2025-01-20 PROCEDURE — 36415 COLL VENOUS BLD VENIPUNCTURE: CPT | Performed by: HOSPITALIST

## 2025-01-20 PROCEDURE — 2500000004 HC RX 250 GENERAL PHARMACY W/ HCPCS (ALT 636 FOR OP/ED): Performed by: HOSPITALIST

## 2025-01-20 PROCEDURE — C1874 STENT, COATED/COV W/DEL SYS: HCPCS | Performed by: STUDENT IN AN ORGANIZED HEALTH CARE EDUCATION/TRAINING PROGRAM

## 2025-01-20 PROCEDURE — 2500000001 HC RX 250 WO HCPCS SELF ADMINISTERED DRUGS (ALT 637 FOR MEDICARE OP)

## 2025-01-20 PROCEDURE — C1725 CATH, TRANSLUMIN NON-LASER: HCPCS | Performed by: STUDENT IN AN ORGANIZED HEALTH CARE EDUCATION/TRAINING PROGRAM

## 2025-01-20 PROCEDURE — 80048 BASIC METABOLIC PNL TOTAL CA: CPT | Performed by: HOSPITALIST

## 2025-01-20 PROCEDURE — 2780000003 HC OR 278 NO HCPCS: Performed by: STUDENT IN AN ORGANIZED HEALTH CARE EDUCATION/TRAINING PROGRAM

## 2025-01-20 PROCEDURE — 2720000007 HC OR 272 NO HCPCS: Performed by: STUDENT IN AN ORGANIZED HEALTH CARE EDUCATION/TRAINING PROGRAM

## 2025-01-20 PROCEDURE — 027034Z DILATION OF CORONARY ARTERY, ONE ARTERY WITH DRUG-ELUTING INTRALUMINAL DEVICE, PERCUTANEOUS APPROACH: ICD-10-PCS | Performed by: STUDENT IN AN ORGANIZED HEALTH CARE EDUCATION/TRAINING PROGRAM

## 2025-01-20 PROCEDURE — 2500000001 HC RX 250 WO HCPCS SELF ADMINISTERED DRUGS (ALT 637 FOR MEDICARE OP): Performed by: HOSPITALIST

## 2025-01-20 PROCEDURE — C1887 CATHETER, GUIDING: HCPCS | Performed by: STUDENT IN AN ORGANIZED HEALTH CARE EDUCATION/TRAINING PROGRAM

## 2025-01-20 PROCEDURE — 92979 ENDOLUMINL IVUS OCT C EA: CPT | Mod: LM | Performed by: STUDENT IN AN ORGANIZED HEALTH CARE EDUCATION/TRAINING PROGRAM

## 2025-01-20 PROCEDURE — 2500000002 HC RX 250 W HCPCS SELF ADMINISTERED DRUGS (ALT 637 FOR MEDICARE OP, ALT 636 FOR OP/ED)

## 2025-01-20 PROCEDURE — 82947 ASSAY GLUCOSE BLOOD QUANT: CPT

## 2025-01-20 PROCEDURE — C1894 INTRO/SHEATH, NON-LASER: HCPCS | Performed by: STUDENT IN AN ORGANIZED HEALTH CARE EDUCATION/TRAINING PROGRAM

## 2025-01-20 PROCEDURE — B2151ZZ FLUOROSCOPY OF LEFT HEART USING LOW OSMOLAR CONTRAST: ICD-10-PCS | Performed by: STUDENT IN AN ORGANIZED HEALTH CARE EDUCATION/TRAINING PROGRAM

## 2025-01-20 PROCEDURE — 7100000010 HC PHASE TWO TIME - EACH INCREMENTAL 1 MINUTE: Performed by: STUDENT IN AN ORGANIZED HEALTH CARE EDUCATION/TRAINING PROGRAM

## 2025-01-20 PROCEDURE — 93306 TTE W/DOPPLER COMPLETE: CPT | Performed by: STUDENT IN AN ORGANIZED HEALTH CARE EDUCATION/TRAINING PROGRAM

## 2025-01-20 PROCEDURE — C8929 TTE W OR WO FOL WCON,DOPPLER: HCPCS

## 2025-01-20 PROCEDURE — C1769 GUIDE WIRE: HCPCS | Performed by: STUDENT IN AN ORGANIZED HEALTH CARE EDUCATION/TRAINING PROGRAM

## 2025-01-20 PROCEDURE — 92978 ENDOLUMINL IVUS OCT C 1ST: CPT | Mod: LD | Performed by: STUDENT IN AN ORGANIZED HEALTH CARE EDUCATION/TRAINING PROGRAM

## 2025-01-20 PROCEDURE — 99153 MOD SED SAME PHYS/QHP EA: CPT | Performed by: STUDENT IN AN ORGANIZED HEALTH CARE EDUCATION/TRAINING PROGRAM

## 2025-01-20 PROCEDURE — 2500000004 HC RX 250 GENERAL PHARMACY W/ HCPCS (ALT 636 FOR OP/ED): Performed by: STUDENT IN AN ORGANIZED HEALTH CARE EDUCATION/TRAINING PROGRAM

## 2025-01-20 PROCEDURE — 2550000001 HC RX 255 CONTRASTS: Performed by: STUDENT IN AN ORGANIZED HEALTH CARE EDUCATION/TRAINING PROGRAM

## 2025-01-20 PROCEDURE — 7100000009 HC PHASE TWO TIME - INITIAL BASE CHARGE: Performed by: STUDENT IN AN ORGANIZED HEALTH CARE EDUCATION/TRAINING PROGRAM

## 2025-01-20 DEVICE — STENT ONYXNG27518UX ONYX 2.75X18RX
Type: IMPLANTABLE DEVICE | Site: HEART | Status: FUNCTIONAL
Brand: ONYX FRONTIER™

## 2025-01-20 DEVICE — STENT ONYXNG22518UX ONYX 2.25X18RX
Type: IMPLANTABLE DEVICE | Site: HEART | Status: FUNCTIONAL
Brand: ONYX FRONTIER™

## 2025-01-20 RX ORDER — HEPARIN SODIUM 1000 [USP'U]/ML
INJECTION, SOLUTION INTRAVENOUS; SUBCUTANEOUS AS NEEDED
Status: DISCONTINUED | OUTPATIENT
Start: 2025-01-20 | End: 2025-01-20 | Stop reason: HOSPADM

## 2025-01-20 RX ORDER — FENTANYL CITRATE 50 UG/ML
INJECTION, SOLUTION INTRAMUSCULAR; INTRAVENOUS AS NEEDED
Status: DISCONTINUED | OUTPATIENT
Start: 2025-01-20 | End: 2025-01-20 | Stop reason: HOSPADM

## 2025-01-20 RX ORDER — LIDOCAINE HYDROCHLORIDE 20 MG/ML
INJECTION, SOLUTION INFILTRATION; PERINEURAL AS NEEDED
Status: DISCONTINUED | OUTPATIENT
Start: 2025-01-20 | End: 2025-01-20 | Stop reason: HOSPADM

## 2025-01-20 RX ORDER — VERAPAMIL HYDROCHLORIDE 2.5 MG/ML
INJECTION, SOLUTION INTRAVENOUS AS NEEDED
Status: DISCONTINUED | OUTPATIENT
Start: 2025-01-20 | End: 2025-01-20 | Stop reason: HOSPADM

## 2025-01-20 RX ORDER — PHENYLEPHRINE HCL IN 0.9% NACL 1 MG/10 ML
SYRINGE (ML) INTRAVENOUS AS NEEDED
Status: DISCONTINUED | OUTPATIENT
Start: 2025-01-20 | End: 2025-01-20 | Stop reason: HOSPADM

## 2025-01-20 RX ORDER — NITROGLYCERIN 40 MG/100ML
INJECTION INTRAVENOUS AS NEEDED
Status: DISCONTINUED | OUTPATIENT
Start: 2025-01-20 | End: 2025-01-20 | Stop reason: HOSPADM

## 2025-01-20 RX ORDER — MIDAZOLAM HYDROCHLORIDE 1 MG/ML
INJECTION, SOLUTION INTRAMUSCULAR; INTRAVENOUS AS NEEDED
Status: DISCONTINUED | OUTPATIENT
Start: 2025-01-20 | End: 2025-01-20 | Stop reason: HOSPADM

## 2025-01-20 RX ADMIN — METOPROLOL SUCCINATE 50 MG: 50 TABLET, EXTENDED RELEASE ORAL at 07:53

## 2025-01-20 RX ADMIN — ASPIRIN 81 MG: 81 TABLET, COATED ORAL at 07:53

## 2025-01-20 RX ADMIN — ACETAMINOPHEN 650 MG: 325 TABLET ORAL at 15:59

## 2025-01-20 RX ADMIN — TICAGRELOR 90 MG: 90 TABLET ORAL at 08:21

## 2025-01-20 RX ADMIN — PANTOPRAZOLE SODIUM 40 MG: 40 TABLET, DELAYED RELEASE ORAL at 15:53

## 2025-01-20 RX ADMIN — LISINOPRIL 5 MG: 5 TABLET ORAL at 07:53

## 2025-01-20 RX ADMIN — MORPHINE SULFATE 1 MG: 2 INJECTION, SOLUTION INTRAMUSCULAR; INTRAVENOUS at 05:10

## 2025-01-20 RX ADMIN — PERFLUTREN 2 ML OF DILUTION: 6.52 INJECTION, SUSPENSION INTRAVENOUS at 06:57

## 2025-01-20 RX ADMIN — ACETAMINOPHEN 650 MG: 325 TABLET ORAL at 12:05

## 2025-01-20 RX ADMIN — TAMSULOSIN HYDROCHLORIDE 0.4 MG: 0.4 CAPSULE ORAL at 15:53

## 2025-01-20 RX ADMIN — EMPAGLIFLOZIN 25 MG: 25 TABLET, FILM COATED ORAL at 15:53

## 2025-01-20 RX ADMIN — INSULIN LISPRO 1 UNITS: 100 INJECTION, SOLUTION INTRAVENOUS; SUBCUTANEOUS at 17:09

## 2025-01-20 ASSESSMENT — PAIN SCALES - GENERAL
PAINLEVEL_OUTOF10: 4
PAINLEVEL_OUTOF10: 0 - NO PAIN
PAINLEVEL_OUTOF10: 0 - NO PAIN
PAINLEVEL_OUTOF10: 1
PAINLEVEL_OUTOF10: 7
PAINLEVEL_OUTOF10: 4
PAINLEVEL_OUTOF10: 4
PAINLEVEL_OUTOF10: 2
PAINLEVEL_OUTOF10: 0 - NO PAIN
PAINLEVEL_OUTOF10: 4
PAINLEVEL_OUTOF10: 4
PAINLEVEL_OUTOF10: 0 - NO PAIN
PAINLEVEL_OUTOF10: 0 - NO PAIN
PAINLEVEL_OUTOF10: 1

## 2025-01-20 ASSESSMENT — PAIN DESCRIPTION - ORIENTATION: ORIENTATION: LEFT

## 2025-01-20 ASSESSMENT — COGNITIVE AND FUNCTIONAL STATUS - GENERAL
DAILY ACTIVITIY SCORE: 24
MOBILITY SCORE: 24

## 2025-01-20 ASSESSMENT — PAIN DESCRIPTION - LOCATION
LOCATION: CHEST
LOCATION: CHEST

## 2025-01-20 NOTE — DISCHARGE INSTRUCTIONS
CARDIAC CATHETERIZATION DISCHARGE INSTRUCTIONS     FOR SUDDEN AND SEVERE CHEST PAIN, SHORTNESS OF BREATH, EXCESSIVE BLEEDING, SIGNS OF STROKE, OR CHANGES IN MENTAL STATUS YOU SHOULD CALL 911 IMMEDIATELY.     If your provider has prescribed aspirin and/or clopidogrel (Plavix), or prasugrel (Effient), or ticagrelor (Brilinta), DO NOT STOP THESE MEDICATIONS for any reason without talking to your cardiologist first. If any of these were prescribed, you must take them every day without missing a single dose. If you are getting low on these medications, contact your provider immediately for a refill.     FOR NEXT 24 HOURS  - Upon discharge, you should return home and rest for the remainder of the day and evening. You do not have to stay on bed rest but should not be very active.  It is recommended a responsible adult be with you for the first 24 hours after the procedure.    - No driving for 24 hours after procedure. Please arrange for someone to drive you home from the hospital today.     - Do not drive, operate machinery, or use power tools for 24 hours after your procedure.     - Do not make any legal decisions for 24 hours after your procedure.     - Do not drink alcoholic beverages for 24 hours after your procedure.    WOUND CARE   *FOR FEMORAL (LEG) ACCESS*  ·      Avoid heavy lifting (over 10 pounds) for 7 days, squatting or excessive bending for 2 days, and strenuous exercise for 7 days.  ·      No submerged bathing, swimming, or hot tubs for the next 7 days, or until fully healed.  ·      Avoid sexual activity for 3-4 days until any groin discomfort has ceased.    *FOR RADIAL (WRIST) ACCESS*  ·      No lifting more than 5 pounds or excessive use of the wrist for 24 hours - for example, treat your wrist as if it is sprained.  ·      Do not engage in vigorous activities (tennis, golf, bowling, weights) for at least 48 hours after the procedure.  ·      Do not submerge the wrist for 7 days after the  procedure.  ·      You should expect mild tingling in your hand and tenderness at the puncture site for up to 3 days.    - The transparent dressing should be removed from the site 24 hours after the procedure.  Wash the site gently with soap and water. Rinse well and pat dry. Keep the area clean and dry. You may apply a Band-Aid to the site. Avoid lotions, ointments, or powders until fully healed.     - You may shower the day after your procedure.      - It is normal to notice a small bruise around the puncture site and/or a small grape sized or smaller lump. Any large bruising or large lump warrants a call to the office.     - If bleeding should occur, lay down and apply pressure to the affected area for 10 minutes.  If the bleeding stops notify your physician.  If there is a large amount of bleeding or spurting of blood CALL 911 immediately.  DO NOT drive yourself to the hospital.    - You may experience some tenderness, bruising or minimal inflammation.  If you have any concerns, you may contact the Cath Lab or if any of these symptoms become excessive, contact your cardiologist or go to the emergency room.     OTHER INSTRUCTIONS  - You may take acetaminophen (Tylenol) as directed for discomfort.  If pain is not relieved with acetaminophen (Tylenol), contact your doctor.    - If you notice or experience any of the following, you should notify your doctor or seek medical attention  Chest pain or discomfort  Change in mental status or weakness in extremities.  Dizziness, light headedness, or feeling faint.  Change in the site where the procedure was performed, such as bleeding or an increased area of bruising or swelling.  Tingling, numbness, pain, or coolness in the leg/arm beyond the site where the procedure was performed.  Signs of infection (i.e. shaking chills, temperature > 100 degrees Fahrenheit, warmth, redness) in the leg/arm area where the procedure was performed.  Changes in urination   Bloody or black  stools  Vomiting blood  Severe nose bleeds  Any excessive bleeding    - If you DO NOT have an appointment with your cardiologist within 2-4 weeks following your procedure, please contact their office.    Lifestyle Recommendations for a Healthier Life:    The following lifestyle changes can have a significant positive impact on your overall health - helping you to achieve healthy weight, lower metabolic and heart disease risk and manage stress more effectively:      1. Eat whole, fresh foods. Food is one of the biggest drivers of our overall health.  Make your meals whole foods based, focusing on a wide variety of non starchy vegetables and fruits.  It also beneficial to include various nuts, seeds and high-quality animal proteins for overall balanced diet.    2. Remove the sweeteners from your diet.  Artificial sweeteners have a negative impact on our metabolic health - they can cause increase in both glucose and insulin, increasing the risk or potential for insulin resistance and abnormal blood sugar levels.  Artificial sweeteners are also excessively sweeter than regular sugar, they trick our taste buds into craving extreme forms of sweet, like an addiction.  I encourage you to avoid sugar, but also stevia, aspartame, sucralose, sugar alcohols like xylitol and maltitol.    3. Get rid of the inflammatory factors in your diet - this mainly comes from sugars of all kinds and refined vegetable oils.  These can increase our risk for changes in our metabolic health which can lead to diabetes, heart disease and other chronic disease.  You can reverse or combat the effective of inflammatory foods by increasing your intake of anti-inflammatory foods like wild-caught fish, fresh ground flax seed, fish oil and variety of fruits & vegetables.      4. Eat plenty of fiber. The standard American diet has very low levels of daily dietary fiber, many people barely get 15 grams per day.  There is plenty of nutrition research that  shows how high-fiber foods can help lower our blood sugar.   Eat a wide variety of fiber-rich plant-based foods including nuts, seeds, fruits, vegetables, and legumes.    5. Get enough sleep. Studies from experts in endocrinology & metabolism have shown that even a few nights of poor sleep can increase the risk of insulin resistance and abnormal blood sugar values. Make sleep a priority - it is an important factor in your health.  Develop a sleep routine including: avoid eating two-three hours before bed, practice relaxation techniques (warm bath, meditation, yoga, mindfulness) to help relax your muscles and prepare you for sleep.    Go to bed and wake up at consistent times.  Avoid screen time for at least 60-90 minutes before bedtime.    6. Make exercise part of your regular routine.  Exercise helps us manage blood sugar more effectively and makes our cells more receptive to the effect of the insulin our body makes (lowers blood sugar).  Regular aerobic exercise AND interval or strength training are ideal to help maintain a healthy weight, metabolism & lower the risk of chronic disease.      7. Control stress levels. Chronic stress can lead to elevated blood sugar, elevated blood pressure, accumulation of fat around the belly and these things increase the risk for metabolic syndrome, diabetes and heart disease.  We all have various levels of stress in our lives, we can't control all of it, but we can control how we respond to it and manage it's impact.  Find healthy outlets for stress management like meditation, yoga, deep breathing, or exercise.    Home BP monitoring instructions:   Goal < 130 / 80   Remain still, Avoid smoking, caffeinated beverages, or exercise within 30 min before BP measurements.  Ensure 5 min of quiet rest before BP measurements.  Sit correctly with back straight and supported (on a straight-backed dining chair, for example, rather than a sofa).  Sit with feet flat on the floor and legs  uncrossed.  Keep arm supported on a flat surface (such as a table), with the upper arm at heart level.  Bottom of the cuff should be placed directly above the bend of the elbow  Take a reading in the AM before breakfast 2-3 times / week   Record all readings accurately:  A written log should be brought to all appointments   Monitors with built-in memory should be brought to all clinic appointments and calibrated to our machines      Weight Management:  Since food equals calories, in order to lose weight you must either eat fewer calories, exercise more to burn off calories with activity, or both. Food that is not used to fuel the body is stored as fat.    A major component of losing weight is to make smarter food choices. Here's how:    Limit non-nutritious foods, such as:  Sugar, honey, syrups and candy  Pastries, donuts, pies, cakes and cookies  Soft drinks, sweetened juices and alcoholic beverages    Cut down on high-fat foods by:  Choosing poultry, fish or lean red meat  Choosing low-fat cooking methods, such as baking, broiling, steaming, grilling and boiling  Using low-fat or non-fat dairy products  Using vinaigrette, herbs, lemon or fat-free salad dressings  Avoiding fatty meats, such as garcia, sausage, pham, ribs and luncheon meats  Avoiding high-fat snacks like nuts, chips and chocolate  Avoiding fried foods  Using less butter, margarine, oil and mayonnaise  Avoiding high-fat gravies, cream sauces and cream-based soups    Eat a variety of foods, including:  Fruit and vegetables that are raw, steamed or baked  Whole grains, breads, cereal, rice and pasta  Dairy products, such as low-fat or non-fat milk or yogurt, low-fat cottage cheese and low-fat cheese  Protein-rich foods like chicken, turkey, fish, lean meat and legumes, or beans    Change your eating habits:  Eat three balanced meals a day to help control your hunger  Watch portion sizes and eat small servings of a variety of foods  Choose low-calorie  snacks  Eat only when you are hungry and stop when you are satisfied  Eat slowly and try not to perform other tasks while eating  Find other activities to distract you from food, such as walking, taking up a hobby or being involved in the community  Include regular exercise in your daily routine  Find a support group, if necessary, for emotional support in your weight loss effort    Patient Education: Smoking Cessation  Making the commitment to quit smoking is one of the best choices that smokers can make for themselves, but also a difficult one. There are various opportunities for support available. Here is an overview of them.  There are various forms of nicotine replacement therapy available to assist with minimizing these symptoms. They are nicotine gum, nicotine patch, nicotine nasal spray, nicotine inhaler, and nicotine lozenge.  Which kind of nicotine replacement therapy will best work for you can be discussed with your doctor or nurse to help you understand the pros and cons of each.  Non-nicotine replacement therapy is also available. Bupropion (Wellbutrin, Zyban) is a mild anti-depressant that is also effective in helping people to quit smoking. It can be used alone or with nicotine replacement therapy.  Varenicline (Chantix) is another medication that helps people who what to quit. This medication is not a form of nicotine replacement therapy, but it helps with the withdrawal symptoms.  Studies have shown that the best success rates for people trying to quit include counseling and/or support groups such as the National Helpline that is a free, confidential, 24/7, 365-day-a-year treatment referral and information service: 2-085-933-HELP (2097)    Some helpful hints include:  Avoidance. Stay away from smokers and places where you are tempted to smoke.  Activities. Exercise or do hobbies that keep your hands busy.  Alternatives. Use oral substitutes such as sugarless gum, hard candy, and carrot  sticks.  Change of habits. For example, if you usually smoke during a coffee break, then go for a walk instead.  Deep breathing. When you have the urge to smoke, do a deep breathing exercise and remind yourself why you quit.  Delay tactic. If you feel that you are about to light up, delay. Tell yourself you must wait 10 minutes. Often this simple trick will allow you to move beyond the urge  Discussion. Call a friend for support.  Drink of water. Use as an oral substitute such as water.  Many people say the reason they smoke is to deal with stress. Unfortunately, stress is a part of all our lives. When quitting, you will need to find new strategies to deal with stress. There are stress-management classes, and self-help books that can help you discover new ways to reduce and deal with stress.  The bottom line is: don't just try to go it alone-reach out for support to help you achieve your goal.

## 2025-01-20 NOTE — NURSING NOTE
Discharge Note: 1/20/2025 1806 Discharged via wheelchair to private car accompanied by PCT, personal belongings taken with pt, no distress noted, no complaints voiced. Liliana MORILLO

## 2025-01-20 NOTE — DISCHARGE SUMMARY
Discharge Diagnosis  NSTEMI (non-ST elevated myocardial infarction) (Multi)    Issues Requiring Follow-Up  Cardiology    Discharge Meds     Medication List      CONTINUE taking these medications     ascorbic acid 1,000 mg tablet; Commonly known as: Vitamin C   aspirin 81 mg EC tablet   atorvastatin 40 mg tablet; Commonly known as: Lipitor; Take 1 tablet (40   mg) by mouth once daily at bedtime.   b complex 0.4 mg tablet   Brilinta 90 mg tablet; Generic drug: ticagrelor   cinnamon 500 mg capsule   empagliflozin 25 mg; Commonly known as: Jardiance   Fish OiL 1,200 (144-216) mg capsule; Generic drug: omega 3-dha-epa-fish   oil   FreeStyle Lancets 28 gauge; Generic drug: FreeStyle lancets   FreeStyle Lite Strips strip; Generic drug: blood sugar diagnostic   glipiZIDE 10 mg tablet; Commonly known as: Glucotrol   Janumet 50-1,000 mg tablet; Generic drug: SITagliptin phos-metformin   levothyroxine 112 mcg tablet; Commonly known as: Synthroid, Levoxyl   lisinopril 5 mg tablet   metoprolol succinate XL 25 mg 24 hr tablet; Commonly known as:   Toprol-XL; Take 2 tablets (50 mg) by mouth once daily. Do not crush or   chew.   multivitamin tablet   omeprazole 40 mg DR capsule; Commonly known as: PriLOSEC   Osteo Bi-Flex 250-200 mg tablet; Generic drug: glucosamine-chondroitin   tamsulosin 0.4 mg 24 hr capsule; Commonly known as: Flomax   triamcinolone 0.1 % cream; Commonly known as: Kenalog       Test Results Pending At Discharge  Pending Labs       No current pending labs.            Hospital Course     74-year-old male with history of  Coronary artery disease status post stents  Hypertension  Hyperlipidemia  Diabetes mellitus type 2  Hypothyroidism  BPH  Presented with  NSTEMI  History of coronary artery disease status post PCI  LAD/ramus and first OM  Patient was on aspirin and Brilinta, noncompliance  High intensity statins  Given Lovenox per ACS protocol for NSTEMI  Cardiology performed cardiac catheterization  Double vessel  Coronary Artery Disease  Severe 99% In-stent restenosis lesion to stent in prox-mid LAD  Mid-distal LAD 60% lesion  Patent stent to Mid 1st OM   Patent stent to prox-mid Ramus  Mildly reduced LV systolic function ~40%  S/P Successful PCI to mid-distal LAD using one XOCHILT 2.25/18mm (post-dil 2.5mm NC balloon), guided by IVUS  S/P Successful PCI to restenosis in prox-mid LAD using one XOCHILT 2.75/18mm (post-dil 3.0mm NC balloon), guided by IVUS  Beta-blockers on metoprolol 50 Mg daily  Also lisinopril 5 Mg daily for hypertension as pressure allows  Synthroid 112 mcg daily  No ST elevation  Monitor on telemetry  Supportive care symptomatic management  Education counseling  Flomax for BPH was given  Pain control with morphine IV  Prophylaxis addressed per policy  Patient hemodynamically and medically stable.  Chest pain-free  No complications after procedure.  Can DC home per discussion with cardiology today  Pertinent Physical Exam At Time of Discharge  Physical Exam  General Appearance: AAO x 3,   Skin: skin color pink, warm, and dry; no suspicious rashes or lesions  Eyes : PERRL, EOM's intact  ENT: mucous membranes pink and moist  Neck: normocephalic  Respiratory: lungs clear to auscultation anteriorly; no wheezing, rhonchi, or crackles.   Heart: regular rate and rhythm. telemetry shows sinus rhythm  Abdomen: Nondistended, positive bowel sounds x4, soft,  nontender  Extremities: no edema   Peripheral pulses: normal x4 extremities  Neuro: alert, coherent and conversant, no focal motor deficits  Outpatient Follow-Up  Future Appointments   Date Time Provider Department Center   1/22/2025  3:30 PM CARDIAC REHAB [address]   1/24/2025  3:30 PM CARDIAC REHAB [address]   1/27/2025  3:30 PM CARDIAC REHAB [address]   1/28/2025 10:45 AM Tyshawn Dixon MD [id number] [facility]   1/29/2025  3:30 PM CARDIAC REHAB [address]    2/4/2025  9:30 AM GIGI NM ADMIN ROOM 1 [facility]   2/4/2025 10:00 AM GIGI NM 1 [facility]   2/4/2025 10:30 AM GIGI STRESS 1 [id number] [facility]   2/4/2025 11:00 AM GIGI NM ADMIN ROOM 1 [facility]   2/4/2025 11:15 AM GIGI NM 1 [facility]   2/4/2025 12:30 PM GIGI ECHO 1 [id number] [facility]   3/27/2025  1:45 PM Tyshawn Dixon MD [id number] [place]     Time to dc > 35 mins       Janet Lane MD

## 2025-01-20 NOTE — POST-PROCEDURE NOTE
Physician Transition of Care Summary  Invasive Cardiovascular Lab    Procedure Date: 1/20/2025  Attending:    * Tyshawn Dixon - Primary  Resident/Fellow/Other Assistant: Surgeons and Role:  * No surgeons found with a matching role *    Indications:   Pre-op Diagnosis      * NSTEMI (non-ST elevated myocardial infarction) (Multi) [I21.4]    Post-procedure diagnosis:   Post-op Diagnosis     * NSTEMI (non-ST elevated myocardial infarction) (Multi) [I21.4]    Procedure(s):   Left Heart Cath, With LV    IVUS - Coronary  56093 - SC ENDOLUMINAL CORONARY IVUS OCT I&R INITIAL VESSEL    PCI XOCHILT Stent- Coronary    Procedure Findings:   Double vessel Coronary Artery Disease  Severe 99% In-stent restenosis lesion to stent in prox-mid LAD  Mid-distal LAD 60% lesion  Patent stent to Mid 1st OM   Patent stent to prox-mid Ramus  Mildly reduced LV systolic function ~40%  S/P Successful PCI to mid-distal LAD using one XOCHILT 2.25/18mm (post-dil 2.5mm NC balloon), guided by IVUS  S/P Successful PCI to restenosis in prox-mid LAD using one XOCHILT 2.75/18mm (post-dil 3.0mm NC balloon), guided by IVUS    Description of the Procedure:   R radial artery access with 6F sheath. LV and Ao hemodynamic measurements. S/P Left Heart Catheterization with 5F Tig catheter that showed double vessel obstructive coronary artery disease.     Severe 99% In-stent restenosis lesion to stent in prox-mid LAD  Mid-distal LAD 60% lesion  Patent stent to Mid 1st OM   Patent stent to prox-mid Ramus  Mildly reduced LV systolic function ~40%    Catheter switched to 6F XB 3.5. Heparinization 100ui/Kg. ACT > 250s.      S/P Successful PCI to mid-distal LAD using one XOCHILT 2.25/18mm (post-dil 2.5mm NC balloon), guided by intra-vascular ultrasound (IVUS).     S/P Successful PCI to restenosis in prox-mid LAD using one XOCHILT 2.75/18mm (post-dil 3.0mm NC balloon), guided by IVUS.    Patient remained stable during the entire procedure. No complications. Hemostasis with TR  Band.      Plan:    Should be discharged home keeping ASA 81mg daily and Ticagrelor 90mg BID.   Compared to the pre-procedural EKG, post-procedural EKG with no new abnormalities and no signs of acute coronary syndrome.   Keep hydration 100mL/h for at least 3 hours, ideally 6 hours.   Bed rest for 3 hours. Constant check for bleeding. Maintain compression band (CB) for 120 minutes past procedure. Remove 3mL of air from CB every 15 minutes until fully deflated. If recurrent bleeding occurs, re-inflate with enough air to fully restore hemostasis (2 to 3 mL, maximum of 18 mL). Maintain CB at this same level for 30 minutes before attempting to re-deflate. Once fully deflated, carefully remove CB and place a sterile dressing over access site.  Observe for one hour, checking pulse every 15 minutes.  Instruct patient not to use or bend their wrist for four hours.     Cardiac rehab. Cardiology follow up. Would suggest to keep guideline-directed medical therapy (GDMT). Patient has not been well compliant with Ticagrelor. We have reinforced the importance for keeping compliance to medication.    Complications:   No    Stents/Implants:   Implants       Stent    Stent, [place], 2.25 X 18rx - [id number] - Implanted        Inventory item: STENT, BUZZ FRONTIER PHILLIP, 2.25 X 18RX Model/Cat number: [id number]    : [employer] Lot number: [id number]    Device identifier: [id number]        GUDID Information       Request status Successful        Brand name: [place]™ Version/Model: [id number]    Company name: [employer] MRI safety info as of 1/20/25: MR Conditional    Contains dry or latex rubber: No      GMDN P.T. name: Drug-eluting coronary artery stent, non-bioabsorbable-polymer-coated                As of 1/20/2025       Status: Implanted                      Stent, [place], 2.75 X 18rx - [id number] - Implanted        Inventory item: STENT, BUZZ FRONTIER PHILLIP, 2.75 X 18RX Model/Cat  number: [id number]    : [employer] Lot number: [id number]    Device identifier: [id number]        GUDID Information       Request status Successful        Brand name: [place]™ Version/Model: [id number]    Company name: [employer] MRI safety info as of 1/20/25: MR Conditional    Contains dry or latex rubber: No      GMDN P.T. name: Drug-eluting coronary artery stent, non-bioabsorbable-polymer-coated                As of 1/20/2025       Status: Implanted                            Anticoagulation/Antiplatelet Plan:   Should be discharged home keeping ASA 81mg daily and Ticagrelor 90mg BID.     Estimated Blood Loss:   2 mL    Anesthesia: Moderate Sedation Anesthesia Staff: No anesthesia staff entered.    Any Specimen(s) Removed:   No specimens collected during this procedure.    Disposition:   Home    Electronically signed by: Tyshawn Dixon MD, 1/20/2025 11:02 AM

## 2025-01-20 NOTE — PROGRESS NOTES
Subjective Data:  Patient reports feeling well, no new adverse events overnight. Patient denies any chest pain, shortness of breath, palpitations, dizziness or syncope. Patient is hemodynamically stable. No chest pain yesterday.     Overnight Events:    No     Objective Data:  Last Recorded Vitals:  Vitals:    01/19/25 0900 01/19/25 1537 01/19/25 2100 01/20/25 0736   BP: 104/64 88/53 94/61 106/69   BP Location:  Left arm Left arm Right arm   Patient Position:  Lying Lying Lying   Pulse: 70 67 63 62   Resp:   18 18   Temp:  36.1 °C (97 °F) 36.3 °C (97.3 °F) 36.3 °C (97.4 °F)   TempSrc:  Temporal Temporal Temporal   SpO2:  93% 93% 96%   Weight:       Height:           Last Labs:  CBC - 1/20/2025:  4:34 AM  7.6 16.7 228    49.6      CMP - 1/20/2025:  4:34 AM  9.2 6.4 30 --- 0.6   _ 4.1 20 62      PTT - 1/17/2025:  1:31 PM  0.9   10.7 30     TROPHS   Date/Time Value Ref Range Status   01/18/2025 12:36 PM 2,000 0 - 20 ng/L Final   01/17/2025 11:54  0 - 20 ng/L Final     Comment:     Previous result verified on 1/17/2025 1128 on specimen/case [id number] called with component RUST for procedure Troponin I, High Sensitivity, Initial with value 279 ng/L.   01/17/2025 10:52  0 - 20 ng/L Final     BNP   Date/Time Value Ref Range Status   09/09/2024 11:07 AM 54 0 - 99 pg/mL Final     HGBA1C   Date/Time Value Ref Range Status   01/07/2025 10:21 AM 7.0 4.3 - 5.6 % Final     Comment:     American Diabetes Association guidelines indicate that patients with HgbA1c in the range 5.7-6.4% are at increased risk for development of diabetes, and intervention by lifestyle modification may be beneficial. HgbA1c greater or equal to 6.5% is considered diagnostic of diabetes.   09/25/2024 01:28 PM 7.7 4.3 - 5.6 % Final     Comment:     American Diabetes Association guidelines indicate that patients with HgbA1c in the range 5.7-6.4% are at increased risk for development of diabetes, and intervention by lifestyle modification  may be beneficial. HgbA1c greater or equal to 6.5% is considered diagnostic of diabetes.     LDLCALC   Date/Time Value Ref Range Status   09/09/2024 02:50 PM 59 <=99 mg/dL Final     Comment:                                 Near   Borderline      AGE      Desirable  Optimal    High     High     Very High     0-19 Y     0 - 109     ---    110-129   >/= 130     ----    20-24 Y     0 - 119     ---    120-159   >/= 160     ----      >24 Y     0 -  99   100-129  130-159   160-189     >/=190       VLDL   Date/Time Value Ref Range Status   09/09/2024 02:50 PM 60 0 - 40 mg/dL Final      Last I/O:  I/O last 3 completed shifts:  In: 1060 (12.2 mL/kg) [P.O.:1060]  Out: - (0 mL/kg)   Weight: 87.1 kg     Past Cardiology Tests (Last 3 Years):  EKG:  ECG 12 Lead 01/18/2025 (Preliminary)      ECG 12 lead 01/17/2025 (Preliminary)      ECG 12 lead 01/17/2025      ECG 12 lead (Clinic Performed) 01/14/2025      ECG 12 lead 01/13/2025 (Preliminary)      ECG 12 lead 01/13/2025 (Preliminary)      ECG 12 lead (Ancillary Performed) 01/08/2025      Electrocardiogram 12 Lead 09/16/2024      ECG 12 lead 09/14/2024      ECG 12 Lead 09/10/2024      ECG 12 lead 09/09/2024 (Wet Read)      ECG 12 lead 09/09/2024    Echo:  Transthoracic Echo (TTE) Complete 01/20/2025      Transthoracic Echo (TTE) Complete 09/10/2024    Ejection Fractions:  EF   Date/Time Value Ref Range Status   01/20/2025 07:43 AM 38 %    09/10/2024 07:37 AM 60 %      Cath:  Cardiac Catheterization Procedure 09/16/2024      Cardiac Catheterization Procedure 09/10/2024    Stress Test:  No results found for this or any previous visit from the past 1095 days.    Cardiac Imaging:  No results found for this or any previous visit from the past 1095 days.      Inpatient Medications:  Scheduled medications   Medication Dose Route Frequency    aspirin  81 mg oral Daily    atorvastatin  40 mg oral Nightly    empagliflozin  25 mg oral Daily    enoxaparin  1 mg/kg subcutaneous BID    insulin  lispro  0-5 Units subcutaneous TID AC    levothyroxine  112 mcg oral Daily    lisinopril  5 mg oral Daily    metoprolol succinate XL  50 mg oral Daily    pantoprazole  40 mg oral Daily before breakfast    perflutren lipid microspheres  2 mL of dilution intravenous Once in imaging    perflutren protein A microsphere  0.5 mL intravenous Once in imaging    polyethylene glycol  17 g oral Daily    sulfur hexafluoride microsphr  2 mL intravenous Once in imaging    tamsulosin  0.4 mg oral Daily    ticagrelor  90 mg oral BID     PRN medications   Medication    acetaminophen    Or    acetaminophen    Or    acetaminophen    acetaminophen    Or    acetaminophen    Or    acetaminophen    dextrose    dextrose    glucagon    glucagon    morphine    morphine    ondansetron ODT    Or    ondansetron     Continuous Medications   Medication Dose Last Rate       Physical Exam:  General: alert, oriented and in no acute distress  HEENT: NC/AT; EOMI; PERRLA, external ear is normal  Neck: supple; trachea midline; no masses; no JVD  Chest: clear breath sounds bilaterally; no wheezing  Cardio: regular rhythm, S1S2 normal, no murmurs  Abdomen: Soft, non-tender, non-distension, no organomegaly  Extremities: no clubbing/cyanosis/edema  Neuro: Grossly intact     Psychiatric: Normal mood and affect      Assessment/Plan     Mr. Gael Burnette is a 74 y.o. former smoker diabetic male with prior medical history significant for CAD S/P PCI to LAD and OM (09/2024), hypertension, diabetes mellitus, hyperlipidemia, hypothyroidism. He presented to ED [facility] on 01/17/2025 complaining of chest pain. He explained that for the past weeks he have been experiencing chest pressure similar to the one that he had before the stent. The pain gets worse on exertion, especially lifting weights with his left arm, and improves by resting. He even went to ER before due to this pain. The pain increased significantly on 01/17/2025 and he returned to the hospital.  He endorsed a severe chest pain for some minutes that improved with Nitroglycerin. Also had chest pain while walking from the bathroom to the bed. He denies shortness of breath, palpitations, leg edema, lightheadedness, headaches, fever, chills, orthopnea, paroxysmal nocturnal dyspnea or syncope. Notably, he had been missing many times his morning medications (forgot 12 times). EKG showed sinus rhythm with incomplete RBBB and non-specific ST-T changes. Troponin 279 - 310. BNP 54. ACS measurements have been started in the ER. He has been admitted for clinical compensation.     Assessment     # NSTEMI / CAD S/P PCI to LAD / Ramus and 1st OM  - Troponin 279 - 310 - 2,000.   - BNP 54.   - Progressively worsening chest pain even while resting.  - EKG showed sinus rhythm with non-specific ST-T changes.   - Left Heart Catheterization (09/10/2024):  Double vessel Coronary Artery Disease  Mid LAD 95% calcified lesion - bifurcation with 1st Dg (70% ostial lesion) and 2nd Dg (50% ostial lesion)   Mid 1st OM 90% calcified lesion  RCA with irregularities  Preserved LV systolic function  S/P Successful PCI to mid LAD (bifurcation with 1st and 2nd Dg) using one XOCHILT, guided by IVUS  - Left Heart Catheterization (09/10/2024):  Patent stent to mid LAD  Mid 1st OM 90% calcified lesion  Prox-mid Ramus 95% diffuse lesion  S/P Successful PCI to prox-mid ramus using one XOCHILT, guided by IVUS  S/P Successful PCI to mid 1st OM (bifurcation lesion) using one XOCHILT, guided by IVUS  - Patient with known atherosclerotic disease, implying significant increased risk for major adverse cardiac events.  - We had a thorough conversation with the patient about the natural history of atherosclerosis and the importance for commitment with healthy lifestyle, including but not limited to healthy diet, regular exercises, avoid sedentary and compliance to medication.   - We will request echocardiogram and Left Heart Catheterization.  - The natural history of  atherosclerosis was discussed with the patient. The treatment options including GDMT, percutaneous intervention or surgical intervention were discussed with the patient. All the risks, benefits and alternative procedures were discussed. Complications of the procedure were also discussed, including but not limited to risk of bleeding, stroke, infarct, infection, urgent cardiac surgery or death. All questions were answered and the informed consent was obtained. After aforementioned testing and consultation, Left Heart Catheterization with potential Percutaneous Coronary Intervention would be a reasonable approach if the patient is candidate.  - Please keep NPO for Left Heart Catheterization today.  - Would suggest to keep Heparinization, ASA, Ticagrelor, high intensity statin, beta-blocker.  - Echo pending.  - Follow up with Cardiology team upon discharge.     # Hypertension  - Controlled blood pressure.  - Keep current medications including lisinopril 5mg daily.  - Patient counseled to keep a healthy lifestyle including regular exercise and low-sodium diet.  - Recommended home blood pressure monitoring.  - Goal of BP < 130/80mmHg.     # Diabetes  - Counseled on healthy diet and regular exercises.  - Discussed need for weight loss and the benefits.   - Keep current medications including empaglifozin, glipizide.     # Hyperlipidemia  - Keep home medication with Atorvastatin 40mg daily.  - Counseled on healthy diet and regular exercise.      # Hypothyroidism  - Keep Levothyroxine 112mcg daily      Thank you for allowing me to participate in the care of this patient. Please reach me out if you have any questions or if you need any clarifications regarding the patient's care.     Peripheral IV 01/17/25 20 G Left;Posterior Hand (Active)   Site Assessment Clean;Dry;Intact 01/20/25 0600   Line Status Saline locked 01/20/25 0600   Dressing Status Clean;Dry 01/20/25 0600   Number of days: 3     Code Status:  Full Code    Tyshawn  Mumtaz Dixon MD  Cardiology

## 2025-01-20 NOTE — NURSING NOTE
Patient discharged via wheelchair to private vehicle.    Home going instructions specific to procedure given: Yes    1 Easily Arousable; Responding Appropriately: Yes    2. VS +/- 20% of preprocedure status: Yes     3. Significant complications are absent: Yes    4. Ambulates without dizziness/Age appropriate activity: Yes    5. Pulse Ox great than or equal to 92% or above on room air /ordered oxygen treatment: Yes    6. Care Plan Complete: N/A    7. Post cath discharge education completed and information provided to patient and family: Yes    8. If Patient had PCI performed-Stent card sent home with Patient: Yes  Stent card was given to Denise (patient's wife) in room 315 during bedside report with Juana Dueñas RN.     Patient and family have no further questions at this time. Gave patient and family department number and office number if any questions should arise.

## 2025-01-20 NOTE — NURSING NOTE
Discharge Note: 1/20/2025 8632 AVS and pt responsibilities reviewed with pt and copy given. Heart cath, chest pain, education reviewed with pt and information sheets given. Pt verbalizes understanding of instructions received, verbalizes understanding of when to seek medical attention, denies any home going or personal care needs. Denies further questions or concerns. Reviewed follow up appts with pt and verbalizes understanding. Splint intact to right wrist, denies cardiac symptoms. Liliana MORILLO

## 2025-01-20 NOTE — NURSING NOTE
I met with Gael Burnette in his room in the cardiac cath lab S/P PCI on 01/20/2025. He is alert and oriented and open to discussion. I reviewed contact information, allergies, current medications, and past medical history. We discussed what cardiac rehab entails, length of time, session length, education, and when we would be reaching out for Gael Burnette to start. Gael Burnette is interested in coming to cardiac rehab. Cardiac rehab folder reviewed and provided along with our contact information. We will reach out next week the week of 01/27/2025 to discuss further. All questions answered.

## 2025-01-20 NOTE — PROGRESS NOTES
Medication Education     Medication education for Gael Burnette was provided to the patient  for the following medication(s):  No new medications      Medication education provided by a Pharmacist:  Importance of compliance    Identified potential barriers to education:  None    Method(s) of Education:  Verbal Written materials provided and reviewed    An opportunity to ask questions and receive answers was provided.     Assessment of understanding the patient :  2= meets goals/outcomes    Additional Notes (if applicable):   Reinforced the importance of Brilinta as part of his regimen to reduce the risk of stroke and heart attack  Patient states that he has made changes to his home habits to insure he takes both doses of his Brilinta a day    Yoselin Wynne, PharmD

## 2025-01-21 DIAGNOSIS — Z95.5 S/P PRIMARY ANGIOPLASTY WITH CORONARY STENT: Primary | ICD-10-CM

## 2025-01-21 LAB
ATRIAL RATE: 68 BPM
P AXIS: 77 DEGREES
P OFFSET: 176 MS
P ONSET: 109 MS
PR INTERVAL: 204 MS
Q ONSET: 211 MS
QRS COUNT: 11 BEATS
QRS DURATION: 108 MS
QT INTERVAL: 420 MS
QTC CALCULATION(BAZETT): 446 MS
QTC FREDERICIA: 438 MS
R AXIS: -63 DEGREES
T AXIS: 75 DEGREES
T OFFSET: 421 MS
VENTRICULAR RATE: 68 BPM

## 2025-01-22 ENCOUNTER — APPOINTMENT (OUTPATIENT)
Dept: CARDIAC REHAB | Facility: HOSPITAL | Age: 75
End: 2025-01-22
Payer: MEDICARE

## 2025-01-24 ENCOUNTER — APPOINTMENT (OUTPATIENT)
Dept: CARDIAC REHAB | Facility: HOSPITAL | Age: 75
End: 2025-01-24
Payer: MEDICARE

## 2025-01-24 NOTE — TELEPHONE ENCOUNTER
Pt called office stating stopped at [facility] to  new order Metoprolol Succinate 50mg one tab po every day ordered on 1/14/25 by Dr. Dixon and pharmacy didn't have order. This nurse called [facility] and order hadn't transmitted on 1/14/25. Verbal order for Metoprolol Succinate 50mg one tab po every day given for 30 tablets x0 refills.

## 2025-01-27 ENCOUNTER — APPOINTMENT (OUTPATIENT)
Dept: CARDIAC REHAB | Facility: HOSPITAL | Age: 75
End: 2025-01-27
Payer: MEDICARE

## 2025-01-28 ENCOUNTER — APPOINTMENT (OUTPATIENT)
Dept: CARDIOLOGY | Facility: CLINIC | Age: 75
End: 2025-01-28
Payer: MEDICARE

## 2025-01-28 VITALS
HEIGHT: 70 IN | BODY MASS INDEX: 28.46 KG/M2 | HEART RATE: 66 BPM | OXYGEN SATURATION: 96 % | WEIGHT: 198.8 LBS | DIASTOLIC BLOOD PRESSURE: 58 MMHG | SYSTOLIC BLOOD PRESSURE: 100 MMHG

## 2025-01-28 DIAGNOSIS — I25.10 ATHEROSCLEROSIS OF NATIVE CORONARY ARTERY OF NATIVE HEART WITHOUT ANGINA PECTORIS: ICD-10-CM

## 2025-01-28 DIAGNOSIS — E11.9 DIABETES MELLITUS TYPE II, NON INSULIN DEPENDENT (MULTI): ICD-10-CM

## 2025-01-28 DIAGNOSIS — I10 HYPERTENSION, UNSPECIFIED TYPE: ICD-10-CM

## 2025-01-28 DIAGNOSIS — E03.9 HYPOTHYROIDISM, UNSPECIFIED TYPE: ICD-10-CM

## 2025-01-28 DIAGNOSIS — E78.5 HYPERLIPIDEMIA, UNSPECIFIED HYPERLIPIDEMIA TYPE: ICD-10-CM

## 2025-01-28 DIAGNOSIS — I25.10 CORONARY ARTERY DISEASE INVOLVING NATIVE CORONARY ARTERY OF NATIVE HEART, UNSPECIFIED WHETHER ANGINA PRESENT: ICD-10-CM

## 2025-01-28 DIAGNOSIS — I21.4 NSTEMI (NON-ST ELEVATED MYOCARDIAL INFARCTION) (MULTI): Primary | ICD-10-CM

## 2025-01-28 DIAGNOSIS — Z95.5 HX OF HEART ARTERY STENT: ICD-10-CM

## 2025-01-28 PROCEDURE — 4010F ACE/ARB THERAPY RXD/TAKEN: CPT | Performed by: STUDENT IN AN ORGANIZED HEALTH CARE EDUCATION/TRAINING PROGRAM

## 2025-01-28 PROCEDURE — 99214 OFFICE O/P EST MOD 30 MIN: CPT | Performed by: STUDENT IN AN ORGANIZED HEALTH CARE EDUCATION/TRAINING PROGRAM

## 2025-01-28 PROCEDURE — 3078F DIAST BP <80 MM HG: CPT | Performed by: STUDENT IN AN ORGANIZED HEALTH CARE EDUCATION/TRAINING PROGRAM

## 2025-01-28 PROCEDURE — 3074F SYST BP LT 130 MM HG: CPT | Performed by: STUDENT IN AN ORGANIZED HEALTH CARE EDUCATION/TRAINING PROGRAM

## 2025-01-28 PROCEDURE — 1160F RVW MEDS BY RX/DR IN RCRD: CPT | Performed by: STUDENT IN AN ORGANIZED HEALTH CARE EDUCATION/TRAINING PROGRAM

## 2025-01-28 PROCEDURE — 1159F MED LIST DOCD IN RCRD: CPT | Performed by: STUDENT IN AN ORGANIZED HEALTH CARE EDUCATION/TRAINING PROGRAM

## 2025-01-28 PROCEDURE — 1036F TOBACCO NON-USER: CPT | Performed by: STUDENT IN AN ORGANIZED HEALTH CARE EDUCATION/TRAINING PROGRAM

## 2025-01-28 PROCEDURE — 1111F DSCHRG MED/CURRENT MED MERGE: CPT | Performed by: STUDENT IN AN ORGANIZED HEALTH CARE EDUCATION/TRAINING PROGRAM

## 2025-01-28 PROCEDURE — 3008F BODY MASS INDEX DOCD: CPT | Performed by: STUDENT IN AN ORGANIZED HEALTH CARE EDUCATION/TRAINING PROGRAM

## 2025-01-28 NOTE — PROGRESS NOTES
No chief complaint on file.    HPI:  I was requested by Dr. Rivera to evaluate this patient in consultation for cardiac assessment.    Mr. Gael Burnette is a 74 y.o. former smoker diabetic male with prior medical history significant for CAD S/P PCI to LAD (01/2025), PCI to LAD and OM (09/2024), hypertension, diabetes mellitus, hyperlipidemia, hypothyroidism. He presented to ED [facility] on 01/17/2025 complaining of chest pain. He explained that for the past weeks he have been experiencing chest pressure similar to the one that he had before the stent. The pain gets worse on exertion, especially lifting weights with his left arm, and improves by resting. He even went to ER before due to this pain. The pain increased significantly on 01/17/2025 and he returned to the hospital. He endorsed a severe chest pain for some minutes that improved with Nitroglycerin. Also had chest pain while walking from the bathroom to the bed. He denies shortness of breath, palpitations, leg edema, lightheadedness, headaches, fever, chills, orthopnea, paroxysmal nocturnal dyspnea or syncope. Notably, he had been missing many times his morning medications (forgot 12 times). EKG showed sinus rhythm with incomplete RBBB and non-specific ST-T changes. Troponin 279 - 310. BNP 54. ACS measurements have been started in the ER. He has been admitted for clinical compensation. Patient diagnosed with NSTEMI, underwent new S/P Successful PCI to LAD.    Past Medical History  Past Medical History:   Diagnosis Date    Coronary artery disease 09/2024    Diabetes mellitus (Multi) 2003    Disease of thyroid gland     Hyperlipidemia 2007    Hypertension 2007       Past Surgical History  Past Surgical History:   Procedure Laterality Date    CARDIAC CATHETERIZATION N/A 9/10/2024    Procedure: Left Heart Cath, With LV;  Surgeon: Tyshawn Dixon MD;  Location: [facility] Cardiac Cath Lab;  Service: Cardiovascular;  Laterality: N/A;    CARDIAC  CATHETERIZATION N/A 9/10/2024    Procedure: PCI XOCHILT Stent- Coronary;  Surgeon: Tyshawn Dixon MD;  Location: [facility] Cardiac Cath Lab;  Service: Cardiovascular;  Laterality: N/A;    CARDIAC CATHETERIZATION N/A 9/10/2024    Procedure: IVUS - Coronary;  Surgeon: Tyshawn Dixon MD;  Location: [facility] Cardiac Cath Lab;  Service: Cardiovascular;  Laterality: N/A;    CARDIAC CATHETERIZATION N/A 2024    Procedure: PCI XOCHILT Stent- Coronary;  Surgeon: Tyshawn Dixon MD;  Location: [facility] Cardiac Cath Lab;  Service: Cardiovascular;  Laterality: N/A;    CARDIAC CATHETERIZATION N/A 2025    Procedure: Left Heart Cath, With LV;  Surgeon: Tyshawn Dixon MD;  Location: [facility] Cardiac Cath Lab;  Service: Cardiovascular;  Laterality: N/A;    CARDIAC CATHETERIZATION N/A 2025    Procedure: IVUS - Coronary;  Surgeon: Tyshawn Dixon MD;  Location: [facility] Cardiac Cath Lab;  Service: Cardiovascular;  Laterality: N/A;    CARDIAC CATHETERIZATION N/A 2025    Procedure: PCI XOCHILT Stent- Coronary;  Surgeon: Tyshawn Dixon MD;  Location: [facility] Cardiac Cath Lab;  Service: Cardiovascular;  Laterality: N/A;       Past Family History  Family History   Problem Relation Name Age of Onset    Heart disease Father Selvin MAGALLON Yomaira     Heart attack Brother Yosef STALLWORTH Yomaira     Heart disease Other brother        Allergy History  No Known Allergies    Past Social History  Social History     Socioeconomic History    Marital status:    Tobacco Use    Smoking status: Former     Current packs/day: 0.00     Average packs/day: 1.5 packs/day for 11.4 years (17.1 ttl pk-yrs)     Types: Cigarettes     Start date: 1969     Quit date: 1981     Years since quittin.6    Smokeless tobacco: Never   Substance and Sexual Activity    Alcohol use: Never    Drug use: Never    Sexual activity: Not Currently     Partners: Female     Birth control/protection: None     Social Drivers of Health      Financial Resource Strain: Low Risk  (1/18/2025)    Overall Financial Resource Strain (CARDIA)     Difficulty of Paying Living Expenses: Not hard at all   Food Insecurity: No Food Insecurity (1/17/2025)    Hunger Vital Sign     Worried About Running Out of Food in the Last Year: Never true     Ran Out of Food in the Last Year: Never true   Transportation Needs: No Transportation Needs (1/18/2025)    PRAPARE - Transportation     Lack of Transportation (Medical): No     Lack of Transportation (Non-Medical): No   Physical Activity: Sufficiently Active (12/11/2024)    Received from [facility]    Exercise Vital Sign     Days of Exercise per Week: 3 days     Minutes of Exercise per Session: 60 min   Stress: No Stress Concern Present (12/11/2024)    Received from [facility]    Citizen of [place] of Occupational Health - Occupational Stress Questionnaire     Feeling of Stress : Not at all   Social Connections: Socially Integrated (12/11/2024)    Received from [facility]    Social Connection and Isolation Panel [NHANES]     Frequency of Communication with Friends and Family: Once a week     Frequency of Social Gatherings with Friends and Family: Twice a week     Attends Caodaism Services: More than 4 times per year     Active Member of Clubs or Organizations: Yes     Attends Club or Organization Meetings: More than 4 times per year     Marital Status:    Intimate Partner Violence: Not At Risk (1/17/2025)    Humiliation, Afraid, Rape, and Kick questionnaire     Fear of Current or Ex-Partner: No     Emotionally Abused: No     Physically Abused: No     Sexually Abused: No   Housing Stability: Low Risk  (1/18/2025)    Housing Stability Vital Sign     Unable to Pay for Housing in the Last Year: No     Number of Times Moved in the Last Year: 0     Homeless in the Last Year: No       Social History     Tobacco Use   Smoking Status Former    Current packs/day: 0.00    Average packs/day: 1.5 packs/day for  11.4 years (17.1 ttl pk-yrs)    Types: Cigarettes    Start date: 1969    Quit date: 1981    Years since quittin.6   Smokeless Tobacco Never       Review of Systems:  ROS     Objective Data:  Last Recorded Vitals:  There were no vitals filed for this visit.    Last Labs:  CBC - 2025:  4:34 AM  7.6 16.7 228    49.6      CMP - 2025:  4:34 AM  9.2 6.4 30 --- 0.6   _ 4.1 20 62      PTT - 2025:  1:31 PM  0.9   10.7 30     TROPHS   Date/Time Value Ref Range Status   2025 12:36 PM 2,000 0 - 20 ng/L Final   2025 11:54  0 - 20 ng/L Final     Comment:     Previous result verified on 2025 1128 on specimen/case [id number] called with component [facility] for procedure Troponin I, High Sensitivity, Initial with value 279 ng/L.   2025 10:52  0 - 20 ng/L Final     BNP   Date/Time Value Ref Range Status   2024 11:07 AM 54 0 - 99 pg/mL Final     HGBA1C   Date/Time Value Ref Range Status   2025 10:21 AM 7.0 4.3 - 5.6 % Final     Comment:     American Diabetes Association guidelines indicate that patients with HgbA1c in the range 5.7-6.4% are at increased risk for development of diabetes, and intervention by lifestyle modification may be beneficial. HgbA1c greater or equal to 6.5% is considered diagnostic of diabetes.   2024 01:28 PM 7.7 4.3 - 5.6 % Final     Comment:     American Diabetes Association guidelines indicate that patients with HgbA1c in the range 5.7-6.4% are at increased risk for development of diabetes, and intervention by lifestyle modification may be beneficial. HgbA1c greater or equal to 6.5% is considered diagnostic of diabetes.     LDLCALC   Date/Time Value Ref Range Status   2024 02:50 PM 59 <=99 mg/dL Final     Comment:                                 Near   Borderline      AGE      Desirable  Optimal    High     High     Very High     0-19 Y     0 - 109     ---    110-129   >/= 130     ----    20-24 Y     0 - 119     ---     120-159   >/= 160     ----      >24 Y     0 -  99   100-129  130-159   160-189     >/=190       VLDL   Date/Time Value Ref Range Status   09/09/2024 02:50 PM 60 0 - 40 mg/dL Final        Patient Medications:  Outpatient Encounter Medications as of 1/28/2025   Medication Sig Dispense Refill    ascorbic acid (Vitamin C) 1,000 mg tablet Take 1 tablet (1,000 mg) by mouth once daily.      aspirin 81 mg EC tablet Take 1 tablet (81 mg) by mouth once daily.      atorvastatin (Lipitor) 40 mg tablet Take 1 tablet (40 mg) by mouth once daily at bedtime. 30 tablet 0    b complex 0.4 mg tablet Take 1 tablet by mouth once daily.      Cinnamon 500 mg capsule Take 2 capsules (1,000 mg) by mouth 2 times a day.      empagliflozin (Jardiance) 25 mg Take 1 tablet (25 mg) by mouth once daily.      FreeStyle Lancets 28 gauge 1 each if needed.      FreeStyle Lite Strips strip 1 each once daily.      glipiZIDE (Glucotrol) 10 mg tablet Take 1 tablet (10 mg) by mouth 2 times a day before meals.      glucosamine-chondroitin (Osteo Bi-Flex) 250-200 mg tablet Take 1 tablet by mouth once daily in the morning.      levothyroxine (Synthroid, Levoxyl) 112 mcg tablet Take 1 tablet (112 mcg) by mouth early in the morning.. Take on an empty stomach at the same time each day, either 30 to 60 minutes prior to breakfast      lisinopril 5 mg tablet Take 1 tablet (5 mg) by mouth once daily.      metoprolol succinate XL (Toprol-XL) 25 mg 24 hr tablet Take 2 tablets (50 mg) by mouth once daily. Do not crush or chew. 60 tablet 0    multivitamin tablet Take 1 tablet by mouth once daily.      omega 3-dha-epa-fish oil (Fish OiL) 1,200 (144-216) mg capsule Take 1 capsule (1,200 mg) by mouth once daily.      omeprazole (PriLOSEC) 40 mg DR capsule Take 1 capsule (40 mg) by mouth once daily in the morning. Take before meals. Do not crush or chew.      SITagliptin phos-metformin (Janumet) 50-1,000 mg tablet Take 1 tablet by mouth 2 times daily (morning and late  afternoon).      tamsulosin (Flomax) 0.4 mg 24 hr capsule Take 1 capsule (0.4 mg) by mouth once daily.      ticagrelor (Brilinta) 90 mg tablet Take 1 tablet (90 mg) by mouth 2 times a day.      triamcinolone (Kenalog) 0.1 % cream Apply topically once daily as needed for irritation.       No facility-administered encounter medications on file as of 1/28/2025.       Physical Exam:  General: alert, oriented and in no acute distress  HEENT: NC/AT; EOMI; PERRLA, external ear is normal  Neck: supple; trachea midline; no masses; no JVD  Chest: clear breath sounds bilaterally; no wheezing  Cardio: regular rhythm, S1S2 normal, no murmurs  Abdomen: Soft, non-tender, non-distension, no organomegaly  Extremities: no clubbing/cyanosis/edema. Good healing access site  Neuro: Grossly intact     Psychiatric: Normal mood and affect     Past Cardiology Results (Last 3 Years):  EKG:  ECG 12 Lead 01/19/2025      ECG 12 Lead 01/18/2025      ECG 12 lead 01/17/2025      ECG 12 lead 01/17/2025      ECG 12 lead (Clinic Performed) 01/14/2025      ECG 12 lead 01/13/2025      ECG 12 lead 01/13/2025      ECG 12 lead (Ancillary Performed) 01/08/2025      Electrocardiogram 12 Lead 09/16/2024      ECG 12 lead 09/14/2024      ECG 12 Lead 09/10/2024      ECG 12 lead 09/09/2024 (Wet Read)      ECG 12 lead 09/09/2024    Echo:  Echo Results:  Transthoracic Echo (TTE) Complete With Contrast 01/20/2025    [place] [address]  Phone [phone], Fax [phone]    TRANSTHORACIC ECHOCARDIOGRAM REPORT    Patient Name:       TROY Jacques Physician:    [id number] Ar Allen MD  Study Date:         1/20/2025            Ordering Provider:    [id number] SURI ONEILL  MRN/PID:            [id number]             Fellow:  Accession#:         [id number]         Nurse:                Catie Plunkett RN  Date of Birth/Age:  1950 / 74 years  Sonographer:          Tamica  Martin ADLER, YI  Gender Assigned at  M                    Additional Staff:  Birth:  Height:             177.80 cm            Admit Date:           1/17/2025  Weight:             88.00 kg             Admission Status:     Inpatient -  Routine  BSA / BMI:          2.06 m2 / 27.84      Department Location:  [facility] [address] [facility]  kg/m2  Blood Pressure: 94 /61 mmHg    Study Type:    TRANSTHORACIC ECHO (TTE) COMPLETE  Diagnosis/ICD: Non ST elevation (NSTEMI) myocardial infarction-I21.4  Indication:    Acute Coronary Syndrome: Non-STEMI, CP  CPT Codes:     Echo Complete w Full Doppler-69302    Patient History:  Pertinent History: Previous echo 9-10-24.    Study Detail: The following Echo studies were performed: 2D, M-Mode, Doppler and  color flow. Definity used as a contrast agent for endocardial  border definition. Total contrast used for this procedure was 2 mL  via IV push. A bubble study was not performed. The patient was  awake.      PHYSICIAN INTERPRETATION:  Left Ventricle: Left ventricular ejection fraction is moderately decreased, by visual estimate at 35-40%. Multiple wall motion abnormalties exist, see findings. The left ventricular cavity size is normal. There is mild increased septal and mildly increased posterior left ventricular wall thickness. There is left ventricular concentric remodeling. Spectral Doppler shows a Grade I (impaired relaxation pattern) of left ventricular diastolic filling with normal left atrial filling pressure.  LV Wall Scoring:  The entire anterior septum, mid and apical inferior septum, and apex are  hypokinetic. All remaining scored segments are normal.    Left Atrium: The left atrium is normal in size. A bubble study using agitated saline was not performed.  Right Ventricle: The right ventricle is normal in size. There is normal right ventricular global systolic function.  Right Atrium: The right atrium is normal in size.  Aortic Valve: The aortic valve is trileaflet. The aortic valve  dimensionless index is 0.81. There is no evidence of aortic valve regurgitation. The peak instantaneous gradient of the aortic valve is 4 mmHg. The mean gradient of the aortic valve is 2 mmHg.  Mitral Valve: The mitral valve is normal in structure. There is no evidence of mitral valve regurgitation.  Tricuspid Valve: The tricuspid valve is structurally normal. There is mild tricuspid regurgitation.  Pulmonic Valve: The pulmonic valve is not well visualized. There is no indication of pulmonic valve regurgitation.  Pericardium: No pericardial effusion noted.  Aorta: The aortic root is normal.  In comparison to the previous echocardiogram(s): Compared to prior TTE 9/10/2024 - wall motion abnormalities appear to be new. Definity was not used on prior study.      CONCLUSIONS:  1. Left ventricular ejection fraction is moderately decreased, by visual estimate at 35-40%.  2. Entire anterior septum, mid and apical inferior septum, and apex are abnormal.  3. Multiple wall motion abnormalties exist, see findings.  4. Spectral Doppler shows a Grade I (impaired relaxation pattern) of left ventricular diastolic filling with normal left atrial filling pressure.  5. There is normal right ventricular global systolic function.  6. Compared to prior TTE 9/10/2024 - wall motion abnormalities appear to be new. Definity was not used on prior study.    QUANTITATIVE DATA SUMMARY:    2D MEASUREMENTS:           Normal Ranges:  Ao Root d:       2.70 cm   (2.0-3.7cm)  LAs:             2.40 cm   (2.7-4.0cm)  IVSd:            1.35 cm   (0.6-1.1cm)  LVPWd:           1.05 cm   (0.6-1.1cm)  LVIDd:           3.49 cm   (3.9-5.9cm)  LVIDs:           2.57 cm  LV Mass Index:   65.6 g/m2  LV % FS          26.4 %      LA VOLUME:                    Normal Ranges:  LA Vol A4C:        22.1 ml    (22+/-6mL/m2)  LA Vol A2C:        31.5 ml  LA Vol BP:         28.3 ml  LA Vol Index A4C:  10.7ml/m2  LA Vol Index A2C:  15.3 ml/m2  LA Vol Index BP:   13.7 ml/m2  LA  Area A4C:       10.4 cm2  LA Area A2C:       13.3 cm2  LA Major Axis A4C: 4.2 cm  LA Major Axis A2C: 4.8 cm  LA Volume Index:   14.9 ml/m2  LA Vol A4C:        21.3 ml  LA Vol A2C:        30.7 ml  LA Vol Index BSA:  12.6 ml/m2      M-MODE MEASUREMENTS:         Normal Ranges:  AoV Exc:             2.20 cm (1.5-2.5cm)      AORTA MEASUREMENTS:         Normal Ranges:  AoV Exc:            2.20 cm (1.5-2.5cm)      LV SYSTOLIC FUNCTION BY 2D PLANIMETRY (MOD):  Normal Ranges:  EF-A4C View:    53 % (>=55%)  EF-A2C View:    55 %  EF-Biplane:     55 %  EF-Visual:      38 %  LV EF Reported: 38 %      LV DIASTOLIC FUNCTION:           Normal Ranges:  MV Peak E:             0.62 m/s  (0.7-1.2 m/s)  MV Peak A:             0.78 m/s  (0.42-0.7 m/s)  E/A Ratio:             0.79      (1.0-2.2)  MV e'                  0.059 m/s (>8.0)  MV lateral e'          0.06 m/s  MV medial e'           0.06 m/s  E/e' Ratio:            10.39     (<8.0)      MITRAL VALVE:          Normal Ranges:  MV DT:        176 msec (150-240msec)      MITRAL INSUFFICIENCY:             Normal Ranges:  MR Vmax:              203.00 cm/s      AORTIC VALVE:                     Normal Ranges:  AoV Vmax:                1.06 m/s (<=1.7m/s)  AoV Peak P.5 mmHg (<20mmHg)  AoV Mean P.0 mmHg (1.7-11.5mmHg)  LVOT Max Daniel:            0.89 m/s (<=1.1m/s)  AoV VTI:                 26.70 cm (18-25cm)  LVOT VTI:                21.60 cm  LVOT Diameter:           2.10 cm  (1.8-2.4cm)  AoV Area, VTI:           2.80 cm2 (2.5-5.5cm2)  AoV Area,Vmax:           2.92 cm2 (2.5-4.5cm2)  AoV Dimensionless Index: 0.81      RIGHT VENTRICLE:  RV Basal 3.06 cm  RV Mid   2.28 cm  RV Major 5.3 cm  TAPSE:   15.2 mm      TRICUSPID VALVE/RVSP:          Normal Ranges:  Peak TR Velocity:     2.44 m/s  RV Syst Pressure:     27 mmHg  (< 30mmHg)      PULMONIC VALVE:          Normal Ranges:  PV Accel Time:  141 msec (>120ms)  PV Max Daniel:     0.9 m/s  (0.6-0.9m/s)  PV Max PG:       3.1 mmHg      [id number] Ar Allen MD  Electronically signed on 1/20/2025 at 7:50:47 AM      Wall Scoring        ** Final **      Transthoracic Echo (TTE) Complete 09/10/2024    [place] [address]  Phone [phone] [phone], Fax [phone]    TRANSTHORACIC ECHOCARDIOGRAM REPORT    Patient Name:      TROY CARRANZA       Reading Physician:    Jacek Allen MD  Study Date:        9/10/2024             Ordering Provider:    [id number] SURIERIK ROSARIOO MAEFEDERICA ONEILL  MRN/PID:           [id number]              Fellow:  Accession#:        [id number]          Nurse:  Date of Birth/Age: 1950 / 74 years   Sonographer:          Octaviano Galvez RDCS  Gender:            M                     Additional Staff:  Height:            180.34 cm             Admit Date:  Weight:            87.09 kg              Admission Status:     Inpatient -  Routine  BSA / BMI:         2.07 m2 / 26.78 kg/m2 Department Location:  [address]  Blood Pressure: 99 /63 mmHg    Study Type:    TRANSTHORACIC ECHO (TTE) COMPLETE  Diagnosis/ICD: Other chest pain-R07.89  CPT Codes:     Echo Complete w Full Doppler-12070  Study Detail: The following Echo studies were performed: 2D, M-Mode, color flow  and Doppler.      PHYSICIAN INTERPRETATION:  Left Ventricle: The left ventricular systolic function is normal, with a Basilio's biplane calculated ejection fraction of 60%. There are no regional wall motion abnormalities. The left ventricular cavity size is normal. Spectral Doppler shows a normal pattern of left ventricular diastolic filling.  Left Atrium: The left atrium is normal in size.  Right Ventricle: The right ventricle is normal in size. There is normal right ventricular global systolic function.  Right Atrium: The right atrium is normal in size.  Aortic Valve: The aortic valve is trileaflet. The aortic valve dimensionless index is 0.95. There is no evidence of aortic valve regurgitation.  The peak instantaneous gradient of the aortic valve is 6.5 mmHg. The mean gradient of the aortic valve is 4.0 mmHg.  Mitral Valve: The mitral valve is normal in structure. There is no evidence of mitral valve regurgitation.  Tricuspid Valve: The tricuspid valve is structurally normal. No evidence of tricuspid regurgitation.  Pulmonic Valve: The pulmonic valve is not well visualized. There is no indication of pulmonic valve regurgitation.  Pericardium: There is no pericardial effusion noted.  Aorta: The aortic root is normal.  Systemic Veins: The inferior vena cava appears to be of normal size, IVC inspiratory collapse greater than 50%.      CONCLUSIONS:  1. The left ventricular systolic function is normal, with a Basilio's biplane calculated ejection fraction of 60%.  2. There is normal right ventricular global systolic function.    QUANTITATIVE DATA SUMMARY:    2D MEASUREMENTS:           Normal Ranges:  Ao Root d:       3.20 cm   (2.0-3.7cm)  LAs:             3.20 cm   (2.7-4.0cm)  IVSd:            1.02 cm   (0.6-1.1cm)  LVPWd:           0.84 cm   (0.6-1.1cm)  LVIDd:           4.64 cm   (3.9-5.9cm)  LVIDs:           2.88 cm  LV Mass Index:   70.5 g/m2  LV % FS          37.9 %      LA VOLUME:                   Normal Ranges:  LA Vol A4C:        13.8 ml   (22+/-6mL/m2)  LA Vol A2C:        14.0 ml  LA Vol BP:         14.5 ml  LA Vol Index A4C:  6.7ml/m2  LA Vol Index A2C:  6.8 ml/m2  LA Vol Index BP:   7.0 ml/m2  LA Area A4C:       8.2 cm2  LA Area A2C:       7.9 cm2  LA Major Axis A4C: 4.2 cm  LA Major Axis A2C: 3.8 cm  LA Volume Index:   6.6 ml/m2  LA Vol A4C:        13.6 ml  LA Vol A2C:        14.0 ml  LA Vol Index BSA:  6.7 ml/m2      LV SYSTOLIC FUNCTION BY 2D PLANIMETRY (MOD):  Normal Ranges:  EF-A4C View:    55 % (>=55%)  EF-A2C View:    65 %  EF-Biplane:     60 %  LV EF Reported: 60 %      LV DIASTOLIC FUNCTION:           Normal Ranges:  MV Peak E:             0.56 m/s  (0.7-1.2 m/s)  MV Peak A:              0.74 m/s  (0.42-0.7 m/s)  E/A Ratio:             0.76      (1.0-2.2)  MV e'                  0.108 m/s (>8.0)  MV lateral e'          0.12 m/s  MV medial e'           0.09 m/s  E/e' Ratio:            5.23      (<8.0)      MITRAL VALVE:          Normal Ranges:  MV DT:        243 msec (150-240msec)      AORTIC VALVE:                     Normal Ranges:  AoV Vmax:                1.27 m/s (<=1.7m/s)  AoV Peak P.5 mmHg (<20mmHg)  AoV Mean P.0 mmHg (1.7-11.5mmHg)  LVOT Max Daniel:            1.17 m/s (<=1.1m/s)  AoV VTI:                 25.60 cm (18-25cm)  LVOT VTI:                24.20 cm  LVOT Diameter:           2.00 cm  (1.8-2.4cm)  AoV Area, VTI:           2.97 cm2 (2.5-5.5cm2)  AoV Area,Vmax:           2.89 cm2 (2.5-4.5cm2)  AoV Dimensionless Index: 0.95      RIGHT VENTRICLE:  RV Basal 4.34 cm  RV Mid   3.30 cm  RV Major 7.6 cm  TAPSE:   21.2 mm  RV s'    0.13 m/s      [id number] Ar Allen MD  Electronically signed on 9/10/2024 at 10:36:53 AM        ** Final **     Cath:  Cardiac Catheterization Procedure 2025      Cardiac Catheterization Procedure 2024      Cardiac Catheterization Procedure 09/10/2024    CV NCDR CATHPCI V5 COLLECTION FORM   Stress Test:  No results found for this or any previous visit from the past 1095 days.    Cardiac Imaging:  No results found for this or any previous visit from the past 1095 days.       Assessment/Plan     Mr. Gael Burnette is a 74 y.o. former smoker diabetic male with prior medical history significant for CAD S/P PCI to LAD (2025), PCI to LAD and OM (2024), hypertension, diabetes mellitus, hyperlipidemia, hypothyroidism. He presented to ED [facility] on 2025 complaining of chest pain. He explained that for the past weeks he have been experiencing chest pressure similar to the one that he had before the stent. The pain gets worse on exertion, especially lifting weights with his left arm, and improves by resting. He even  went to ER before due to this pain. The pain increased significantly on 01/17/2025 and he returned to the hospital. He endorsed a severe chest pain for some minutes that improved with Nitroglycerin. Also had chest pain while walking from the bathroom to the bed. He denies shortness of breath, palpitations, leg edema, lightheadedness, headaches, fever, chills, orthopnea, paroxysmal nocturnal dyspnea or syncope. Notably, he had been missing many times his morning medications (forgot 12 times). EKG showed sinus rhythm with incomplete RBBB and non-specific ST-T changes. Troponin 279 - 310. BNP 54. ACS measurements have been started in the ER. He has been admitted for clinical compensation. Patient diagnosed with NSTEMI, underwent new S/P Successful PCI to LAD.     Assessment     # NSTEMI (01/2025) / CAD / S/P PCI to LAD (01/2025) / S/P PCI to LAD / Ramus and 1st OM (09/2024)  - Troponin 279 - 310 - 2,000.   - BNP 54.   - Progressively worsening chest pain even while resting.  - EKG showed sinus rhythm with non-specific ST-T changes.   - Left Heart Catheterization (09/10/2024):  Double vessel Coronary Artery Disease  Mid LAD 95% calcified lesion - bifurcation with 1st Dg (70% ostial lesion) and 2nd Dg (50% ostial lesion)   Mid 1st OM 90% calcified lesion  RCA with irregularities  Preserved LV systolic function  S/P Successful PCI to mid LAD (bifurcation with 1st and 2nd Dg) using one XOCHILT, guided by IVUS  - Left Heart Catheterization (09/10/2024):  Patent stent to mid LAD  Mid 1st OM 90% calcified lesion  Prox-mid Ramus 95% diffuse lesion  S/P Successful PCI to prox-mid ramus using one XOCHILT, guided by IVUS  S/P Successful PCI to mid 1st OM (bifurcation lesion) using one XOCHILT, guided by IVUS  - Patient returned with chest pain / NSTEMI on 01/2025.  - Echo (01/2025):   1. Left ventricular ejection fraction is moderately decreased, by visual estimate at 35-40%.   2. Entire anterior septum, mid and apical inferior septum, and  apex are abnormal.   3. Multiple wall motion abnormalties exist, see findings.   4. Spectral Doppler shows a Grade I (impaired relaxation pattern) of left ventricular diastolic filling with normal left atrial filling pressure.   5. There is normal right ventricular global systolic function.   6. Compared to prior TTE 9/10/2024 - wall motion abnormalities appear to be new. Definity was not used on prior study.  - New Left Heart Catheterization (01/20/2025):  Double vessel Coronary Artery Disease  Severe 99% In-stent restenosis lesion to stent in prox-mid LAD  Mid-distal LAD 60% lesion  Patent stent to Mid 1st OM   Patent stent to prox-mid Ramus  Mildly reduced LV systolic function ~40%  S/P Successful PCI to mid-distal LAD using one OXCHILT 2.25/18mm (post-dil 2.5mm NC balloon), guided by IVUS  S/P Successful PCI to restenosis in prox-mid LAD using one XOCHILT 2.75/18mm (post-dil 3.0mm NC balloon), guided by IVUS  - We had a thorough conversation with the patient about the natural history of atherosclerosis and the importance for commitment with healthy lifestyle, including but not limited to healthy diet, regular exercises, avoid sedentary and compliance to medication.   - Cardiac rehab.  - Keep ASA, Ticagrelor, high intensity statin, beta-blocker.  - Follow up in 3 months with new echocardiogram.     # Hypertension  - Controlled blood pressure.  - Keep current medications including lisinopril 5mg daily.  - Patient counseled to keep a healthy lifestyle including regular exercise and low-sodium diet.  - Recommended home blood pressure monitoring.  - Goal of BP < 130/80mmHg.     # Diabetes  - Counseled on healthy diet and regular exercises.  - Discussed need for weight loss and the benefits.   - Keep current medications including empaglifozin, glipizide.     # Hyperlipidemia  - Keep home medication with Atorvastatin 40mg daily.  - Counseled on healthy diet and regular exercise.      # Hypothyroidism  - Keep Levothyroxine  112mcg daily      We have discussed the most common side effects of the prescribed medications, indications, drug interactions, risks, complications, and alternatives of medications/therapeutics were explained and discussed. The patient has been requested to monitor closely for any untoward side effects or complications of medications. The patient has been strongly advised to be compliant with the recommendations, all the questions and concerns have been addressed. The patient has been also instructed to call, to return sooner or to go to the emergency department if symptoms persist or get worsen. The patient voiced understanding and denies any further questions at this time.     This note was transcribed using the Dragon Dictation system. There may be grammatical, punctuation, or verbiage errors that occur with voice recognition programs.    Counseling greater than 50% of visit regarding all cardiac issues.    Thank you Dr. Rivera, for allowing me to participate in the care of this patient. Please do not hesitate to contact me with any further questions or concerns.    Tyshawn Dixon MD  Cardiology

## 2025-01-29 ENCOUNTER — APPOINTMENT (OUTPATIENT)
Dept: CARDIAC REHAB | Facility: HOSPITAL | Age: 75
End: 2025-01-29
Payer: MEDICARE

## 2025-02-03 ENCOUNTER — CLINICAL SUPPORT (OUTPATIENT)
Dept: CARDIAC REHAB | Facility: HOSPITAL | Age: 75
End: 2025-02-03
Payer: MEDICARE

## 2025-02-03 VITALS
OXYGEN SATURATION: 96 % | DIASTOLIC BLOOD PRESSURE: 57 MMHG | HEIGHT: 71 IN | SYSTOLIC BLOOD PRESSURE: 109 MMHG | WEIGHT: 197.6 LBS | BODY MASS INDEX: 27.66 KG/M2

## 2025-02-03 DIAGNOSIS — Z95.5 S/P PRIMARY ANGIOPLASTY WITH CORONARY STENT: ICD-10-CM

## 2025-02-03 ASSESSMENT — PATIENT HEALTH QUESTIONNAIRE - PHQ9
SUM OF ALL RESPONSES TO PHQ QUESTIONS 1-9: 2
1. LITTLE INTEREST OR PLEASURE IN DOING THINGS: NOT AT ALL
4. FEELING TIRED OR HAVING LITTLE ENERGY: SEVERAL DAYS
8. MOVING OR SPEAKING SO SLOWLY THAT OTHER PEOPLE COULD HAVE NOTICED. OR THE OPPOSITE, BEING SO FIGETY OR RESTLESS THAT YOU HAVE BEEN MOVING AROUND A LOT MORE THAN USUAL: NOT AT ALL
5. POOR APPETITE OR OVEREATING: NOT AT ALL
3. TROUBLE FALLING OR STAYING ASLEEP OR SLEEPING TOO MUCH: SEVERAL DAYS
7. TROUBLE CONCENTRATING ON THINGS, SUCH AS READING THE NEWSPAPER OR WATCHING TELEVISION: NOT AT ALL
SUM OF ALL RESPONSES TO PHQ QUESTIONS 1-9: 2
2. FEELING DOWN, DEPRESSED OR HOPELESS: NOT AT ALL
6. FEELING BAD ABOUT YOURSELF - OR THAT YOU ARE A FAILURE OR HAVE LET YOURSELF OR YOUR FAMILY DOWN: NOT AT ALL
SUM OF ALL RESPONSES TO PHQ9 QUESTIONS 1 & 2: 0
9. THOUGHTS THAT YOU WOULD BE BETTER OFF DEAD, OR OF HURTING YOURSELF: NOT AT ALL

## 2025-02-03 ASSESSMENT — DUKE ACTIVITY SCORE INDEX (DASI)
CAN YOU DO HEAVY WORK AROUND THE HOUSE LIKE SCRUBBING FLOORS OR LIFTING AND MOVING HEAVY FURNITURE: NO
CAN YOU CLIMB A FLIGHT OF STAIRS OR WALK UP A HILL: YES
CAN YOU RUN A SHORT DISTANCE: YES
TOTAL_SCORE: 42.7
CAN YOU DO MODERATE WORK AROUND THE HOUSE LIKE VACUUMING, SWEEPING FLOORS OR CARRYING GROCERIES: YES
DASI METS SCORE: 8
CAN YOU PARTICIPATE IN STRENOUS SPORTS LIKE SWIMMING, SINGLES TENNIS, FOOTBALL, BASKETBALL, OR SKIING: NO
CAN YOU WALK A BLOCK OR TWO ON LEVEL GROUND: YES
CAN YOU PARTICIPATE IN MODERATE RECREATIONAL ACTIVITIES LIKE GOLF, BOWLING, DANCING, DOUBLES TENNIS OR THROWING A BASEBALL OR FOOTBALL: YES
CAN YOU HAVE SEXUAL RELATIONS: YES
CAN YOU DO YARD WORK LIKE RAKING LEAVES, WEEDING OR PUSHING A MOWER: YES
CAN YOU WALK INDOORS, SUCH AS AROUND YOUR HOUSE: YES
CAN YOU DO LIGHT WORK AROUND THE HOUSE LIKE DUSTING OR WASHING DISHES: YES
CAN YOU TAKE CARE OF YOURSELF (EAT, DRESS, BATHE, OR USE TOILET): YES

## 2025-02-03 NOTE — PROGRESS NOTES
"  Cardiac Rehabilitation Initial Treatment Plan    Name: Gael Burnette  Medical Record Number: [id number]  YOB: 1950  Age: 74 y.o.    Today’s Date: 2/4/2025  Primary Care Physician: Kirill Rivera MD  Referring Physician: Gael Kapoor APRN; Dr. Tyshawn Dixon ([facility])  Program Location: [address]  Primary Diagnosis: Z95.5 -S/P primary angioplasty with coronary stent   Onset/Date of Diagnosis: 1/20/2025    Initial Assessment, not yet started program.    AACVPR Risk Stratification: Moderate    Falls Risk: Medium  Psychosocial Assessment     Pre PHQ-9: 2    Sent PH-Q 9 to MD if score > 20: No; score < 20    Pt reported/currently experiencing stress: No  Patient uses stress management skills: Yes   History of: no history of anxiety or depression  Currently seeing a mental health provider: No  Social Support: Yes, Whom:Wife and Children  Quality of Life Survey: SF-36   SF-36 Pre Post   Physical Component Score 43.29 TBD   Mental Component Score 62.69 TBD     Learning Assessment:  Learning assessment/barriers: None  Preferred learning method: Auditory, Visual, Reading handout, and Writing handout  Barriers: None  Comments: N/A    Stages of Change:Preparation    Psychosocial Plan    Goal Status: Initial Assessment; goals not yet started    Psychosocial Goals:   Maintain or decrease the patient's PHQ-9 score by 1 point by decreasing the amount of days the patient has trouble falling asleep or staying asleep, or sleeping too much or feels tired and has little energy. Patient scored in \"none-minimal\" category, score of 2).  Learn and utilize stress management skills and apply them to reported stressors while in the program.  Question on new or current psychological issues at least every 30 days.  Improve PCS and MCS scores by at least 3 points by discharge.  Educate patient on ways to reduce stress and the importance of stress management in regards to cardiac " "health.  Provide 1:1 emotional support as needed and facilitate peer support within the context of the other phase 2 patients.       Psychosocial Interventions/Education:  Encourage patient to complete all emotional health and stress reduction activities and worksheets provided throughout the program.       Nutrition Assessment:    Hyperlipidemia: Yes     Lipids:   Lab Results   Component Value Date    CHOL 149 09/09/2024    HDL 30.0 09/09/2024    TRIG 301 (H) 09/09/2024       Current Dietary Guidelines: Regular diet, no restrictions  Barriers to dietary change: no    Diet Habit Survey: Picture Your Plate  Pre: 48  Post: To be done at discharge.    Diabetes Assessment    Lab Results   Component Value Date    HGBA1C 7.0 (H) 01/07/2025       History of Diabetes: Yes Pt monitors BS at home: Yes   Frequency: 1-2 /day  FBS range: 140 - 170  Hypoglycemic Episodes: No     Weight Management    Pre:   Height: 179.7 cm (5' 10.75\")  Weight: 89.6 kg (197 lb 9.6 oz)  BMI (Calculated): 27.76    Post: To be done at discharge    Waist Measurement:   Pre: 44.5\"   Post: To be done at discharge.      Nutrition Plan    Goal Status: Initial Assessment; goals not yet started    Nutrition Goals:  Increase PYP score to greater than 60 by discharge. (On admission, patient scored 48 which is in category, \"A dietary pattern that is also unlikely to meet current recommendations for good health and has room for improvement\"  Provide education on nutritional aspects related to cardiac health and discuss dietary improvements while in the program.   Lose 1/2 inch from waist by discharge. (44.5\" On admission).  Gain knowledge on lipids and have a better understanding of lab results during the program.   Discuss PYP scores within the first 12 sessions of the program.  Refer to diabetic educator by end of program.       Nutrition Interventions/Education:   Informed patient that nutritional topics will be discussed including following of the " "Mediterranean Diet. Patient verbalized understanding. 2/3/2025  Explained to patient that education will be completed with book and workbook and expected for completion. Discussed with patient that there will be several different topics over the 12 weeks. Patient verbalized understanding. 2/3/2025    Exercise Assessment    Yes  Mode: Aerobic & Strength Training   Frequency: 2-3 days/week  Duration: 30 minutes    Exercise Prescription     Exercise Prescription based on: 6 Minute Walk Test    6MWT: Yes  Pre Test O2 Sat/HR: 96/71  6 Minute O2 Sat/HR: 97/82  Distance Walked(ft): 1200  Nevin Dyspnea: 1  Nevin PRE: 0  Post Test O2 Sat/HR: 96/75  Adverse Reactions : none     Frequency:  3 days/week   Mode: Treadmill, NuStep, and Recumbent Cycle   Duration: >30 total aerobic minutes   Intensity: RPE <15  Target HR:  Rest+30    Max HR: 118-133  MET Level: 6MW 2.74  Patient wears supplemental O2: No     Modality Workload METs Duration (minutes)   1 Rest      2 Warm Up    5 :00   3 Treadmill 2.2 mph @ 0%  2.7 12 :00   4 Recumbent Bike 23 Esparza @ 2  2.7 12 :00   5 NuStep 16 Esparza @ 2  2.7 12 :00   6 Cooldown   5:00   7 Rest        Resistance Training: No, to be given at session #9  Home Exercise Prescription given: To be given prior to discharge from program.    Exercise Plan    Goal Status: Initial Assessment; goals not yet started    Exercise Goals:   Increase mets to 5.0 by the end of the program.  Gain independence and confidence with exercise while in the program.  Have a plan to continue exercise after he completes the program.  Increase mets by 1.0 every 6 weeks as tolerated.    Exercise Interventions/Education:   \"What are exactly mets\" handout provided and discussed. Patient verbalized understanding. 2/3/2025  NEVIN handout provided and discussed. Patient verbalized understanding. 2/3/2025    Other Core Components/Risk Factor Assessment:    Medication adherence  Current Medications:   Medication Documentation Review Audit "       Reviewed by Sis Mcgee RN (Registered Nurse) on 02/03/25 at 1443      Medication Order Taking? Sig Documenting Provider Last Dose Status   ascorbic acid (Vitamin C) 1,000 mg tablet [id number] No Take 1 tablet (1,000 mg) by mouth once daily. Elliott Flores MD 1/17/2025 Morning Active   aspirin 81 mg EC tablet [id number]  Take 1 tablet (81 mg) by mouth once daily. Elliott Flores MD  Active   atorvastatin (Lipitor) 40 mg tablet [id number] No Take 1 tablet (40 mg) by mouth once daily at bedtime. Rach Francisco, APRN-CNP 1/12/2025 Evening Active   b complex 0.4 mg tablet [id number] No Take 1 tablet by mouth once daily. Elliott Flores MD 1/17/2025 Morning Active   Cinnamon 500 mg capsule [id number] No Take 2 capsules (1,000 mg) by mouth 2 times a day. Elliott Flores MD 1/17/2025 Morning Active   empagliflozin (Jardiance) 25 mg [id number] No Take 1 tablet (25 mg) by mouth once daily. Elliott Flores MD 1/17/2025 Morning Active   FreeStyle Lancets 28 gauge [id number] No 1 each if needed. Elliott Flores MD 1/17/2025 Morning Active   FreeStyle Lite Strips strip [id number] No 1 each once daily. Elliott Flores MD 1/17/2025 Morning Active   glipiZIDE (Glucotrol) 10 mg tablet [id number] No Take 1 tablet (10 mg) by mouth 2 times a day before meals. Elliott Flores MD 1/17/2025 Morning Active   glucosamine-chondroitin (Osteo Bi-Flex) 250-200 mg tablet [id number] No Take 1 tablet by mouth once daily in the morning. Elliott Flores MD 1/17/2025 Morning Active   levothyroxine (Synthroid, Levoxyl) 112 mcg tablet [id number] No Take 1 tablet (112 mcg) by mouth early in the morning.. Take on an empty stomach at the same time each day, either 30 to 60 minutes prior to breakfast Elliott Flores MD 1/17/2025 Morning Active   lisinopril 5 mg tablet [id number] No Take 1 tablet (5 mg) by mouth once daily. Elliott Flores MD 1/20/2025 Morning Active   metoprolol succinate XL  (Toprol-XL) 25 mg 24 hr tablet [id number] No Take 2 tablets (50 mg) by mouth once daily. Do not crush or chew. Tyshawn Dixon MD 2025 Morning Active   multivitamin tablet [id number] No Take 1 tablet by mouth once daily. Elliott Flores MD 2025 Morning Active   omega 3-dha-epa-fish oil (Fish OiL) 1,200 (144-216) mg capsule [id number] No Take 1 capsule (1,200 mg) by mouth once daily. Elliott Flores MD 2025 Morning Active   omeprazole (PriLOSEC) 40 mg DR capsule [id number] No Take 1 capsule (40 mg) by mouth once daily in the morning. Take before meals. Do not crush or chew. Elliott Flores MD 2025 Morning Active   SITagliptin phos-metformin (Janumet) 50-1,000 mg tablet [id number] No Take 1 tablet by mouth 2 times daily (morning and late afternoon). Elliott Flores MD 2025 Morning Active   tamsulosin (Flomax) 0.4 mg 24 hr capsule [id number] No Take 1 capsule (0.4 mg) by mouth once daily. Elliott Flores MD 2025 Morning Active   ticagrelor (Brilinta) 90 mg tablet [id number] No Take 1 tablet (90 mg) by mouth 2 times a day. Elliott Flores MD 2025 Morning Active   triamcinolone (Kenalog) 0.1 % cream [id number] No Apply topically once daily as needed for irritation. Historical Provider, MD Unknown Active                                 Medication compliance: Yes   Uses pill box/organizer: Yes    Carries medication list: Yes     Blood Pressure Management  History of Hypertension: Yes   Medication Changes: No   Resting BP:  Visit Vitals  /57        Heart Failure Management  Hx of Heart Failure: No    Smoking/Tobacco Assessment  Social History     Tobacco Use   Smoking Status Former    Current packs/day: 0.00    Average packs/day: 1.5 packs/day for 11.4 years (17.1 ttl pk-yrs)    Types: Cigarettes    Start date: 1969    Quit date: 1981    Years since quittin.6   Smokeless Tobacco Never       Other Core Component Plan    Goal Status:  "Initial Assessment; goals not yet started    Other Core Component Goals:   Achieve and maintain a resting BP of <130/80 while in the program  Increase knowledge test score from 14/15 to 15/15 by discharge.  Monitor blood pressure pre, during and post workout.  Meet and discuss knowledge test within the first 12 sessions.     Other Core Component Interventions/Education:   Educate the patient on the importance of carrying and updating medication list.  Handouts given, \"Benefits of Cardiac Rehab\" and American [employer] Association's, \"What is Cardiac Rehab?\" Patient verbalized understanding. 2/3/2025  Monitor blood pressure pre, during and post workout.    Individual Patient Goals:    Improve energy levels.  Develop exercise routine to continue after program.     Goal Status: Initial Assessment; goals not yet started    Staff Comments:  Patient oriented to 3:30 pm class time. Patient completed all entry paperwork. All entry level paperwork will be reviewed with patient over the course of rehab. Patient was able to complete 6 minute walk with no shortness of breath. Patient was able to achieve 2.74 METs by walking 1200 feet at a pace of 2.27 mph. Patient tolerated all well. Patient will begin cardiac rehab on Friday, February 7th, 2025    Rehab Staff Signature: Sis Mcgee RN        "

## 2025-02-04 ENCOUNTER — APPOINTMENT (OUTPATIENT)
Dept: RADIOLOGY | Facility: HOSPITAL | Age: 75
End: 2025-02-04
Payer: MEDICARE

## 2025-02-04 ENCOUNTER — APPOINTMENT (OUTPATIENT)
Dept: CARDIOLOGY | Facility: HOSPITAL | Age: 75
End: 2025-02-04
Payer: MEDICARE

## 2025-02-07 ENCOUNTER — CLINICAL SUPPORT (OUTPATIENT)
Dept: CARDIAC REHAB | Facility: HOSPITAL | Age: 75
End: 2025-02-07
Payer: MEDICARE

## 2025-02-07 DIAGNOSIS — Z95.5 S/P PRIMARY ANGIOPLASTY WITH CORONARY STENT: Primary | ICD-10-CM

## 2025-02-07 PROCEDURE — 93798 PHYS/QHP OP CAR RHAB W/ECG: CPT | Performed by: STUDENT IN AN ORGANIZED HEALTH CARE EDUCATION/TRAINING PROGRAM

## 2025-02-07 PROCEDURE — 94618 PULMONARY STRESS TESTING: CPT | Performed by: STUDENT IN AN ORGANIZED HEALTH CARE EDUCATION/TRAINING PROGRAM

## 2025-02-10 ENCOUNTER — CLINICAL SUPPORT (OUTPATIENT)
Dept: CARDIAC REHAB | Facility: HOSPITAL | Age: 75
End: 2025-02-10
Payer: MEDICARE

## 2025-02-10 DIAGNOSIS — Z95.5 S/P PRIMARY ANGIOPLASTY WITH CORONARY STENT: Primary | ICD-10-CM

## 2025-02-10 PROCEDURE — 93798 PHYS/QHP OP CAR RHAB W/ECG: CPT | Performed by: STUDENT IN AN ORGANIZED HEALTH CARE EDUCATION/TRAINING PROGRAM

## 2025-02-12 ENCOUNTER — CLINICAL SUPPORT (OUTPATIENT)
Dept: CARDIAC REHAB | Facility: HOSPITAL | Age: 75
End: 2025-02-12
Payer: MEDICARE

## 2025-02-12 VITALS — DIASTOLIC BLOOD PRESSURE: 68 MMHG | SYSTOLIC BLOOD PRESSURE: 127 MMHG

## 2025-02-12 DIAGNOSIS — Z95.5 S/P PRIMARY ANGIOPLASTY WITH CORONARY STENT: Primary | ICD-10-CM

## 2025-02-12 PROCEDURE — 93798 PHYS/QHP OP CAR RHAB W/ECG: CPT | Performed by: STUDENT IN AN ORGANIZED HEALTH CARE EDUCATION/TRAINING PROGRAM

## 2025-02-14 ENCOUNTER — CLINICAL SUPPORT (OUTPATIENT)
Dept: CARDIAC REHAB | Facility: HOSPITAL | Age: 75
End: 2025-02-14
Payer: MEDICARE

## 2025-02-14 DIAGNOSIS — Z95.5 S/P PRIMARY ANGIOPLASTY WITH CORONARY STENT: Primary | ICD-10-CM

## 2025-02-14 PROCEDURE — 93798 PHYS/QHP OP CAR RHAB W/ECG: CPT | Performed by: STUDENT IN AN ORGANIZED HEALTH CARE EDUCATION/TRAINING PROGRAM

## 2025-02-17 ENCOUNTER — CLINICAL SUPPORT (OUTPATIENT)
Dept: CARDIAC REHAB | Facility: HOSPITAL | Age: 75
End: 2025-02-17
Payer: MEDICARE

## 2025-02-17 DIAGNOSIS — Z95.5 S/P PRIMARY ANGIOPLASTY WITH CORONARY STENT: Primary | ICD-10-CM

## 2025-02-17 PROCEDURE — 93798 PHYS/QHP OP CAR RHAB W/ECG: CPT | Performed by: STUDENT IN AN ORGANIZED HEALTH CARE EDUCATION/TRAINING PROGRAM

## 2025-02-19 ENCOUNTER — CLINICAL SUPPORT (OUTPATIENT)
Dept: CARDIAC REHAB | Facility: HOSPITAL | Age: 75
End: 2025-02-19
Payer: MEDICARE

## 2025-02-19 DIAGNOSIS — Z95.5 S/P PRIMARY ANGIOPLASTY WITH CORONARY STENT: Primary | ICD-10-CM

## 2025-02-19 PROCEDURE — 93798 PHYS/QHP OP CAR RHAB W/ECG: CPT | Performed by: STUDENT IN AN ORGANIZED HEALTH CARE EDUCATION/TRAINING PROGRAM

## 2025-02-21 ENCOUNTER — CLINICAL SUPPORT (OUTPATIENT)
Dept: CARDIAC REHAB | Facility: HOSPITAL | Age: 75
End: 2025-02-21
Payer: MEDICARE

## 2025-02-21 DIAGNOSIS — Z95.5 S/P PRIMARY ANGIOPLASTY WITH CORONARY STENT: Primary | ICD-10-CM

## 2025-02-21 PROCEDURE — 93798 PHYS/QHP OP CAR RHAB W/ECG: CPT | Performed by: STUDENT IN AN ORGANIZED HEALTH CARE EDUCATION/TRAINING PROGRAM

## 2025-02-24 ENCOUNTER — CLINICAL SUPPORT (OUTPATIENT)
Dept: CARDIAC REHAB | Facility: HOSPITAL | Age: 75
End: 2025-02-24
Payer: MEDICARE

## 2025-02-24 DIAGNOSIS — Z95.5 S/P PRIMARY ANGIOPLASTY WITH CORONARY STENT: Primary | ICD-10-CM

## 2025-02-24 PROCEDURE — 93798 PHYS/QHP OP CAR RHAB W/ECG: CPT | Performed by: STUDENT IN AN ORGANIZED HEALTH CARE EDUCATION/TRAINING PROGRAM

## 2025-02-26 ENCOUNTER — CLINICAL SUPPORT (OUTPATIENT)
Dept: CARDIAC REHAB | Facility: HOSPITAL | Age: 75
End: 2025-02-26
Payer: MEDICARE

## 2025-02-26 DIAGNOSIS — Z95.5 S/P PRIMARY ANGIOPLASTY WITH CORONARY STENT: Primary | ICD-10-CM

## 2025-02-26 PROCEDURE — 93798 PHYS/QHP OP CAR RHAB W/ECG: CPT | Performed by: STUDENT IN AN ORGANIZED HEALTH CARE EDUCATION/TRAINING PROGRAM

## 2025-02-28 ENCOUNTER — CLINICAL SUPPORT (OUTPATIENT)
Dept: CARDIAC REHAB | Facility: HOSPITAL | Age: 75
End: 2025-02-28
Payer: MEDICARE

## 2025-02-28 VITALS — DIASTOLIC BLOOD PRESSURE: 73 MMHG | SYSTOLIC BLOOD PRESSURE: 112 MMHG

## 2025-02-28 DIAGNOSIS — Z95.5 S/P PRIMARY ANGIOPLASTY WITH CORONARY STENT: Primary | ICD-10-CM

## 2025-02-28 PROCEDURE — 93798 PHYS/QHP OP CAR RHAB W/ECG: CPT | Performed by: STUDENT IN AN ORGANIZED HEALTH CARE EDUCATION/TRAINING PROGRAM

## 2025-03-03 ENCOUNTER — CLINICAL SUPPORT (OUTPATIENT)
Dept: CARDIAC REHAB | Facility: HOSPITAL | Age: 75
End: 2025-03-03
Payer: MEDICARE

## 2025-03-03 DIAGNOSIS — Z95.5 S/P PRIMARY ANGIOPLASTY WITH CORONARY STENT: Primary | ICD-10-CM

## 2025-03-03 PROCEDURE — 93798 PHYS/QHP OP CAR RHAB W/ECG: CPT | Performed by: STUDENT IN AN ORGANIZED HEALTH CARE EDUCATION/TRAINING PROGRAM

## 2025-03-05 ENCOUNTER — CLINICAL SUPPORT (OUTPATIENT)
Dept: CARDIAC REHAB | Facility: HOSPITAL | Age: 75
End: 2025-03-05
Payer: MEDICARE

## 2025-03-05 VITALS — SYSTOLIC BLOOD PRESSURE: 120 MMHG | DIASTOLIC BLOOD PRESSURE: 72 MMHG

## 2025-03-05 DIAGNOSIS — Z95.5 S/P PRIMARY ANGIOPLASTY WITH CORONARY STENT: Primary | ICD-10-CM

## 2025-03-05 PROCEDURE — 93798 PHYS/QHP OP CAR RHAB W/ECG: CPT | Performed by: STUDENT IN AN ORGANIZED HEALTH CARE EDUCATION/TRAINING PROGRAM

## 2025-03-07 ENCOUNTER — CLINICAL SUPPORT (OUTPATIENT)
Dept: CARDIAC REHAB | Facility: HOSPITAL | Age: 75
End: 2025-03-07
Payer: MEDICARE

## 2025-03-07 DIAGNOSIS — Z95.5 S/P PRIMARY ANGIOPLASTY WITH CORONARY STENT: Primary | ICD-10-CM

## 2025-03-07 PROCEDURE — 93798 PHYS/QHP OP CAR RHAB W/ECG: CPT | Performed by: STUDENT IN AN ORGANIZED HEALTH CARE EDUCATION/TRAINING PROGRAM

## 2025-03-10 ENCOUNTER — CLINICAL SUPPORT (OUTPATIENT)
Dept: CARDIAC REHAB | Facility: HOSPITAL | Age: 75
End: 2025-03-10
Payer: MEDICARE

## 2025-03-10 DIAGNOSIS — Z95.5 S/P PRIMARY ANGIOPLASTY WITH CORONARY STENT: Primary | ICD-10-CM

## 2025-03-10 PROCEDURE — 93798 PHYS/QHP OP CAR RHAB W/ECG: CPT | Performed by: STUDENT IN AN ORGANIZED HEALTH CARE EDUCATION/TRAINING PROGRAM

## 2025-03-12 ENCOUNTER — CLINICAL SUPPORT (OUTPATIENT)
Dept: CARDIAC REHAB | Facility: HOSPITAL | Age: 75
End: 2025-03-12
Payer: MEDICARE

## 2025-03-12 VITALS — DIASTOLIC BLOOD PRESSURE: 70 MMHG | SYSTOLIC BLOOD PRESSURE: 119 MMHG

## 2025-03-12 DIAGNOSIS — Z95.5 S/P PRIMARY ANGIOPLASTY WITH CORONARY STENT: Primary | ICD-10-CM

## 2025-03-12 PROCEDURE — 93798 PHYS/QHP OP CAR RHAB W/ECG: CPT | Performed by: STUDENT IN AN ORGANIZED HEALTH CARE EDUCATION/TRAINING PROGRAM

## 2025-03-14 ENCOUNTER — CLINICAL SUPPORT (OUTPATIENT)
Dept: CARDIAC REHAB | Facility: HOSPITAL | Age: 75
End: 2025-03-14
Payer: MEDICARE

## 2025-03-14 DIAGNOSIS — Z95.5 S/P PRIMARY ANGIOPLASTY WITH CORONARY STENT: Primary | ICD-10-CM

## 2025-03-14 PROCEDURE — 93798 PHYS/QHP OP CAR RHAB W/ECG: CPT | Performed by: STUDENT IN AN ORGANIZED HEALTH CARE EDUCATION/TRAINING PROGRAM

## 2025-03-17 ENCOUNTER — CLINICAL SUPPORT (OUTPATIENT)
Dept: CARDIAC REHAB | Facility: HOSPITAL | Age: 75
End: 2025-03-17
Payer: MEDICARE

## 2025-03-17 VITALS — DIASTOLIC BLOOD PRESSURE: 71 MMHG | SYSTOLIC BLOOD PRESSURE: 124 MMHG

## 2025-03-17 DIAGNOSIS — Z95.5 S/P PRIMARY ANGIOPLASTY WITH CORONARY STENT: Primary | ICD-10-CM

## 2025-03-17 PROCEDURE — 93798 PHYS/QHP OP CAR RHAB W/ECG: CPT | Performed by: STUDENT IN AN ORGANIZED HEALTH CARE EDUCATION/TRAINING PROGRAM

## 2025-03-19 ENCOUNTER — CLINICAL SUPPORT (OUTPATIENT)
Dept: CARDIAC REHAB | Facility: HOSPITAL | Age: 75
End: 2025-03-19
Payer: MEDICARE

## 2025-03-19 DIAGNOSIS — Z95.5 S/P PRIMARY ANGIOPLASTY WITH CORONARY STENT: Primary | ICD-10-CM

## 2025-03-19 PROCEDURE — 93798 PHYS/QHP OP CAR RHAB W/ECG: CPT | Performed by: STUDENT IN AN ORGANIZED HEALTH CARE EDUCATION/TRAINING PROGRAM

## 2025-03-21 ENCOUNTER — CLINICAL SUPPORT (OUTPATIENT)
Dept: CARDIAC REHAB | Facility: HOSPITAL | Age: 75
End: 2025-03-21
Payer: MEDICARE

## 2025-03-21 VITALS — SYSTOLIC BLOOD PRESSURE: 107 MMHG | DIASTOLIC BLOOD PRESSURE: 63 MMHG

## 2025-03-21 DIAGNOSIS — Z95.5 S/P PRIMARY ANGIOPLASTY WITH CORONARY STENT: Primary | ICD-10-CM

## 2025-03-21 PROCEDURE — 93798 PHYS/QHP OP CAR RHAB W/ECG: CPT | Performed by: STUDENT IN AN ORGANIZED HEALTH CARE EDUCATION/TRAINING PROGRAM

## 2025-03-24 ENCOUNTER — CLINICAL SUPPORT (OUTPATIENT)
Dept: CARDIAC REHAB | Facility: HOSPITAL | Age: 75
End: 2025-03-24
Payer: MEDICARE

## 2025-03-24 DIAGNOSIS — Z95.5 S/P PRIMARY ANGIOPLASTY WITH CORONARY STENT: Primary | ICD-10-CM

## 2025-03-24 PROCEDURE — 93798 PHYS/QHP OP CAR RHAB W/ECG: CPT | Performed by: STUDENT IN AN ORGANIZED HEALTH CARE EDUCATION/TRAINING PROGRAM

## 2025-03-26 ENCOUNTER — CLINICAL SUPPORT (OUTPATIENT)
Dept: CARDIAC REHAB | Facility: HOSPITAL | Age: 75
End: 2025-03-26
Payer: MEDICARE

## 2025-03-26 VITALS — DIASTOLIC BLOOD PRESSURE: 71 MMHG | SYSTOLIC BLOOD PRESSURE: 112 MMHG

## 2025-03-26 DIAGNOSIS — Z95.5 S/P PRIMARY ANGIOPLASTY WITH CORONARY STENT: Primary | ICD-10-CM

## 2025-03-26 PROCEDURE — 93798 PHYS/QHP OP CAR RHAB W/ECG: CPT | Performed by: STUDENT IN AN ORGANIZED HEALTH CARE EDUCATION/TRAINING PROGRAM

## 2025-03-27 ENCOUNTER — APPOINTMENT (OUTPATIENT)
Dept: CARDIOLOGY | Facility: CLINIC | Age: 75
End: 2025-03-27
Payer: MEDICARE

## 2025-03-27 NOTE — PROGRESS NOTES
"  Cardiac Rehabilitation 60 Day Reassessment    Name: Gael Burnette  Medical Record Number: [id number]  YOB: 1950  Age: 74 y.o.    Today’s Date: 3/27/2025  Primary Care Physician: Kirill Rivera MD  Referring Physician: Gael Kapoor APRN; Dr. Tyshawn Dixon ([facility])  Program Location: [address]   Exercise Start Date: 2/7/2025  Sessions Completed: 21    General  Primary Diagnosis: Z95.5 -S/P primary angioplasty with coronary stent   Onset/Date of Diagnosis: 1/20/2025    AACVPR Risk Stratification: Moderate    Falls Risk: Medium  Psychosocial Assessment     Pre PHQ-9: 2    Sent PH-Q 9 to MD if score > 20: No; score < 20    Pt reported/currently experiencing stress: No  Patient uses stress management skills: Yes   History of: no history of anxiety or depression  Currently seeing a mental health provider: No  Social Support: Yes, Whom:Wife and Children  Quality of Life Survey: SF-36   SF-36 Pre Post   Physical Component Score 43.29 TBD   Mental Component Score 62.69 TBD     Learning Assessment:  Learning assessment/barriers: None  Preferred learning method: Auditory, Visual, Reading handout, and Writing handout  Barriers: None  Comments: N/A    Stages of Change:Action    Psychosocial Plan    Goal Status: In progress    Psychosocial Goals:   Maintain or decrease the patient's PHQ-9 score by 1 point by decreasing the amount of days the patient has trouble falling asleep or staying asleep, or sleeping too much or feels tired and has little energy. Patient scored in \"none-minimal\" category, score of 2) (in progress).  Learn and utilize stress management skills and apply them to reported stressors while in the program (in progress).   Question on new or current psychological issues at least every 30 days (in progress).  Improve PCS and MCS scores by at least 3 points by discharge (in progress).   Educate patient on ways to reduce stress and the importance of stress " "management in regards to cardiac health (in progress).   Provide 1:1 emotional support as needed and facilitate peer support within the context of the other phase 2 patients (In progress).      Psychosocial Interventions/Education:  Encourage patient to complete all emotional health and stress reduction activities and worksheets provided throughout the program. (In progress)  Patient reported stress at 0 out of 10 as of 02/28/2025.  Patient reported stress of 0 out of 10 at this time. 3/26/2025      Nutrition Assessment:    Hyperlipidemia: Yes     Lipids:   Lab Results   Component Value Date    CHOL 149 09/09/2024    HDL 30.0 09/09/2024    TRIG 301 (H) 09/09/2024       Current Dietary Guidelines: Regular diet, no restrictions  Barriers to dietary change: no    Diet Habit Survey: Picture Your Plate  Pre: 48  Post: To be done at discharge.    Diabetes Assessment    Lab Results   Component Value Date    HGBA1C 7.0 (H) 01/07/2025       History of Diabetes: Yes Pt monitors BS at home: Yes   Frequency: 1-2 /day  FBS range: 140 - 170  Hypoglycemic Episodes: No     Weight Management    Pre:   Height: 179.7 cm (5' 10.75\")  Weight: 89.6 kg (197 lb 9.6 oz)  BMI (Calculated): 27.76    Post: To be done at discharge    Waist Measurement:   Pre: 44.5\"   Post: To be done at discharge.      Nutrition Plan    Goal Status: In progress    Nutrition Goals:  Increase PYP score to greater than 60 by discharge. (On admission, patient scored 48 which is in category, \"A dietary pattern that is also unlikely to meet current recommendations for good health and has room for improvement\" (In progress).  Provide education on nutritional aspects related to cardiac health and discuss dietary improvements while in the program (Met, 2/24/2025).   Lose 1/2 inch from waist by discharge. (44.5\" On admission) (in progress).   Gain knowledge on lipids and have a better understanding of lab results during the program (Met, 2/26/2025)  Discuss PYP scores " "within the first 12 sessions of the program (Goal met, 02/28/2025).  Refer to diabetic educator by end of program (Goal met, 3/3/2025).      Nutrition Interventions/Education:   Informed patient that nutritional topics will be discussed including following of the Mediterranean Diet. Patient verbalized understanding. 2/3/2025  Explained to patient that education will be completed with book and workbook and expected for completion. Discussed with patient that there will be several different topics over the 12 weeks. Patient verbalized understanding. 2/3/2025  Reviewed and discussed PYP scores with patient, including providing sheet with tips for improvement. Patient acknowledged understanding, 02/24/2025.  Assigned Chapter 2 Nutrition Quiz.  Patient also assigned to review handouts on the Mediterranean diet including food pyramid, sample diet, and shopping list. Patient also given handouts, \"AHA's \"Heart Healthy Eating on a Budget,\" and \"Building a Heart Healthy Plate.\" Patient acknowledged understanding, 02/24/2025.  Referred to diabetic educator, 3/3/2025.  Assigned Chapter 5: High Cholesterol Quiz. Discussed and provided verbal education on patient's last lipid levels reported. Discussed both HDL and LDL cholesterol as well as goals for total cholesterol and triglycerides. Assigned patient to read handouts, \"AHA's What is Cholesterol?\", \"How to control cholesterol,\" \"Know your Cholesterol Numbers\" and \"How do my cholesterol levels affect my risk of heart attack and stroke?\" Patient verbalized understanding, 3/26/2025.   Assigned Chapter 5: Understanding Diabetes quiz. Assigned patient to read handout,\" Take Diabetes to heart,\" \"Know your numbers,\" and  \"ADA's, \"Food for thought.\" Patient verbalized understanding. 3/25/2025    Exercise Assessment    Yes  Mode: Aerobic & Strength Training   Frequency: 2-3 days/week  Duration:  30 minutes    Exercise Prescription     Exercise Prescription based on: 6 Minute Walk " "Test    6MWT: Yes  Pre Test O2 Sat/HR: 96/71  6 Minute O2 Sat/HR: 97/82  Distance Walked(ft): 1200  Nevin Dyspnea: 1  Nevin PRE: 0  Post Test O2 Sat/HR: 96/75  Adverse Reactions : none     Frequency:  3 days/week   Mode: Treadmill, NuStep, and Recumbent Cycle   Duration: >30 total aerobic minutes   Intensity: RPE <15  Target HR:  Rest+30    Max HR: 118-133  MET/ Level: 5.5  Patient wears supplemental O2: No     Modality Workload METs Duration (minutes)   1 Rest   5:00   2 Warm Up    5 :00   3 Treadmill 2.5 mph @ 4%  4.3 12 :00   4 Recumbent Bike 60 Esparza @ 4 5.5 12 :00   5 NuStep 90 Esparza @ 4 4.9 12 :00   6 weights 6 lb @10 3.3 5:00   7 Cooldown   5:00   8 Rest   5:00     Resistance Training: Yes: patient began lifting 5lb. Weights at session 9, 02/26/2025  - Patient is currently lifting 6 lb weights at this time. Patient tolerating well. 3/26/2025    Home Exercise Prescription given: To be given prior to discharge from program.    Exercise Plan    Goal Status: In progress    Exercise Goals:   Increase mets to 5.0 by the end of the program (Goal met, 02/28/2025).  Increase mets to 7.0 by the end of the program (in progress).  Gain independence and confidence with exercise while in the program (In progress,  02/28/2025).  Have a plan to continue exercise after he completes the program (In progress, 02/28/2025).   Increase mets by 1.0 every 6 weeks as tolerated (in progress, 02/28/2025).    Exercise Interventions/Education:   \"What are exactly mets\" handout provided and discussed. Patient verbalized understanding. 2/3/2025  NEVIN handout provided and discussed. Patient verbalized understanding. 2/3/2025  Patient reached first met goal of 5.0 by obtaining 5.5 mets on the bicycle. Patient's goal increased to 7.0, 02/28/2025.  Patient is currently working at level 4 on both the bike and the Nustep, walking 2.5 with a 1% incline and lifting 5lb. Weights. Patient is tolerating well, 02/28/2025.  Patient reports he is " "exercising at home on days when not at rehab, 2 days per week for 60 minutes each day. Patient commended for efforts, 02/28/2025.   Assigned Chapter 3: Exercise More. Patient also to read handouts, \"Aerobic Exercise,\" and \"Real Benefits of Exerise.\" Patient encouraged to begin home exercise program for 20-30 minutes on days when not exercising at cardiac rehab. Patient verbalized understanding, 3/26/2025.    Other Core Components/Risk Factor Assessment:    Medication adherence  Current Medications:   Medication Documentation Review Audit       Reviewed by Sis Mcgee RN (Registered Nurse) on 02/03/25 at 1443      Medication Order Taking? Sig Documenting Provider Last Dose Status   ascorbic acid (Vitamin C) 1,000 mg tablet [id number] No Take 1 tablet (1,000 mg) by mouth once daily. Elliott Flores MD 1/17/2025 Morning Active   aspirin 81 mg EC tablet [id number]  Take 1 tablet (81 mg) by mouth once daily. Elliott Provider, MD  Active   atorvastatin (Lipitor) 40 mg tablet [id number] No Take 1 tablet (40 mg) by mouth once daily at bedtime. Rach Francisco, APRN-CNP 1/12/2025 Evening Active   b complex 0.4 mg tablet [id number] No Take 1 tablet by mouth once daily. Elliott Flores MD 1/17/2025 Morning Active   Cinnamon 500 mg capsule [id number] No Take 2 capsules (1,000 mg) by mouth 2 times a day. Elliott Flores MD 1/17/2025 Morning Active   empagliflozin (Jardiance) 25 mg [id number] No Take 1 tablet (25 mg) by mouth once daily. Elliott Flores MD 1/17/2025 Morning Active   FreeStyle Lancets 28 gauge [id number] No 1 each if needed. Elliott Flores MD 1/17/2025 Morning Active   FreeStyle Lite Strips strip [id number] No 1 each once daily. Elliott Provider, MD 1/17/2025 Morning Active   glipiZIDE (Glucotrol) 10 mg tablet [id number] No Take 1 tablet (10 mg) by mouth 2 times a day before meals. Elliott Provider, MD 1/17/2025 Morning Active   glucosamine-chondroitin (Osteo Bi-Flex) 250-200 mg " tablet [id number] No Take 1 tablet by mouth once daily in the morning. Elliott Flores MD 1/17/2025 Morning Active   levothyroxine (Synthroid, Levoxyl) 112 mcg tablet [id number] No Take 1 tablet (112 mcg) by mouth early in the morning.. Take on an empty stomach at the same time each day, either 30 to 60 minutes prior to breakfast Elliott Flores MD 1/17/2025 Morning Active   lisinopril 5 mg tablet [id number] No Take 1 tablet (5 mg) by mouth once daily. Elliott Flores MD 1/20/2025 Morning Active   metoprolol succinate XL (Toprol-XL) 25 mg 24 hr tablet [id number] No Take 2 tablets (50 mg) by mouth once daily. Do not crush or chew. Tyshawn Dixon MD 1/20/2025 Morning Active   multivitamin tablet [id number] No Take 1 tablet by mouth once daily. Elliott Flores MD 1/17/2025 Morning Active   omega 3-dha-epa-fish oil (Fish OiL) 1,200 (144-216) mg capsule [id number] No Take 1 capsule (1,200 mg) by mouth once daily. Elliott Flores MD 1/17/2025 Morning Active   omeprazole (PriLOSEC) 40 mg DR capsule [id number] No Take 1 capsule (40 mg) by mouth once daily in the morning. Take before meals. Do not crush or chew. Elliott Flores MD 1/17/2025 Morning Active   SITagliptin phos-metformin (Janumet) 50-1,000 mg tablet [id number] No Take 1 tablet by mouth 2 times daily (morning and late afternoon). Elliott Flores MD 1/17/2025 Morning Active   tamsulosin (Flomax) 0.4 mg 24 hr capsule [id number] No Take 1 capsule (0.4 mg) by mouth once daily. Elliott Flores MD 1/17/2025 Morning Active   ticagrelor (Brilinta) 90 mg tablet [id number] No Take 1 tablet (90 mg) by mouth 2 times a day. Elliott Flores MD 1/20/2025 Morning Active   triamcinolone (Kenalog) 0.1 % cream [id number] No Apply topically once daily as needed for irritation. Elliott Provider, MD Unknown Active                 Medication changes, 02/28/2025:  Truicity injection once a week on Mondays  Metoporol 50 mg daily  Patient  "reports he no longer is taking Janumet; but is taking metformin 1000 mg BID     Patient reports no medication changes at this time. 3/26/2025.                 Medication compliance: Yes   Uses pill box/organizer: Yes    Carries medication list: Yes     Blood Pressure Management  History of Hypertension: Yes   Medication Changes: No   Resting BP:  Visit Vitals  /71        Heart Failure Management  Hx of Heart Failure: No    Smoking/Tobacco Assessment  Social History     Tobacco Use   Smoking Status Former    Current packs/day: 0.00    Average packs/day: 1.5 packs/day for 11.4 years (17.1 ttl pk-yrs)    Types: Cigarettes    Start date: 1969    Quit date: 1981    Years since quittin.8   Smokeless Tobacco Never       Other Core Component Plan    Goal Status: In progress    Other Core Component Goals:   Achieve and maintain a resting BP of <130/80 while in the program (in progress).  BP: 112/73 as of 2025  BP: 112/71 as of 3/26/2025  Increase knowledge test score from 14/15 to 15/15 by discharge (in progress).   Monitor blood pressure pre, during and post workout (in progress).   Meet and discuss knowledge test within the first 12 sessions (Goal met, 2025).    Other Core Component Interventions/Education:   Educate the patient on the importance of carrying and updating medication list. (In progress)   Handouts given, \"Benefits of Cardiac Rehab\" and American [employer] Association's, \"What is Cardiac Rehab?\" Patient verbalized understanding. 2/3/2025  Monitor blood pressure pre, during and post workout.   BP as of 2025: 112/73  BP as of 3/26/2025: 112/71  Assigned Chapter 1: Cardiac and Stroke, 2025.   Discussed knowledge test survey results. Went over missed questions and provided rationale for incorrect questions. Patient verbalized understanding, 2025.   Chapter 5 High Blood Pressure Quiz. Instructed patient to review handouts, \"Use herbs and spices instead of salt,\" and " "\"Consequences of high blood pressure.\"  Patient verbalized understanding. 3/26/2025    Individual Patient Goals:    Improve energy levels.  Develop exercise routine to continue after program.     Goal Status: In progress    Staff Comments:  60 Day Reassessment. Patient has completed 21 sessions of cardiac rehab. Patient continues to work hard and obtain daily goals. Patient shows NSR to sinus tachycardia on monitor with no ectopy noted. Patient interacts well with staff and is encouraging to peers. Patient feels he has improved 50% since beginning cardiac rehab. Patient is given daily goals in which he either meets or exceeds. Patient feels he is more dedicated to exercise and is better at remembering to take all of his medications daily. Patient also reports that he is working on adjusting his diet. Will reassess in 30 days.     Rehab Staff Signature: Sis Mcgee RN        "

## 2025-03-28 ENCOUNTER — CLINICAL SUPPORT (OUTPATIENT)
Dept: CARDIAC REHAB | Facility: HOSPITAL | Age: 75
End: 2025-03-28
Payer: MEDICARE

## 2025-03-28 DIAGNOSIS — Z95.5 S/P PRIMARY ANGIOPLASTY WITH CORONARY STENT: Primary | ICD-10-CM

## 2025-03-28 PROCEDURE — 93798 PHYS/QHP OP CAR RHAB W/ECG: CPT | Performed by: STUDENT IN AN ORGANIZED HEALTH CARE EDUCATION/TRAINING PROGRAM

## 2025-03-31 ENCOUNTER — CLINICAL SUPPORT (OUTPATIENT)
Dept: CARDIAC REHAB | Facility: HOSPITAL | Age: 75
End: 2025-03-31
Payer: MEDICARE

## 2025-03-31 DIAGNOSIS — Z95.5 S/P PRIMARY ANGIOPLASTY WITH CORONARY STENT: Primary | ICD-10-CM

## 2025-03-31 PROCEDURE — 93798 PHYS/QHP OP CAR RHAB W/ECG: CPT | Performed by: STUDENT IN AN ORGANIZED HEALTH CARE EDUCATION/TRAINING PROGRAM

## 2025-04-02 ENCOUNTER — CLINICAL SUPPORT (OUTPATIENT)
Dept: CARDIAC REHAB | Facility: HOSPITAL | Age: 75
End: 2025-04-02
Payer: MEDICARE

## 2025-04-02 DIAGNOSIS — Z95.5 S/P PRIMARY ANGIOPLASTY WITH CORONARY STENT: Primary | ICD-10-CM

## 2025-04-02 PROCEDURE — 93798 PHYS/QHP OP CAR RHAB W/ECG: CPT | Performed by: STUDENT IN AN ORGANIZED HEALTH CARE EDUCATION/TRAINING PROGRAM

## 2025-04-04 ENCOUNTER — CLINICAL SUPPORT (OUTPATIENT)
Dept: CARDIAC REHAB | Facility: HOSPITAL | Age: 75
End: 2025-04-04
Payer: MEDICARE

## 2025-04-04 VITALS — DIASTOLIC BLOOD PRESSURE: 69 MMHG | SYSTOLIC BLOOD PRESSURE: 109 MMHG

## 2025-04-04 DIAGNOSIS — Z95.5 S/P PRIMARY ANGIOPLASTY WITH CORONARY STENT: Primary | ICD-10-CM

## 2025-04-04 PROCEDURE — 93798 PHYS/QHP OP CAR RHAB W/ECG: CPT | Performed by: STUDENT IN AN ORGANIZED HEALTH CARE EDUCATION/TRAINING PROGRAM

## 2025-04-07 ENCOUNTER — CLINICAL SUPPORT (OUTPATIENT)
Dept: CARDIAC REHAB | Facility: HOSPITAL | Age: 75
End: 2025-04-07
Payer: MEDICARE

## 2025-04-07 DIAGNOSIS — Z95.5 S/P PRIMARY ANGIOPLASTY WITH CORONARY STENT: Primary | ICD-10-CM

## 2025-04-07 PROCEDURE — 93798 PHYS/QHP OP CAR RHAB W/ECG: CPT | Performed by: STUDENT IN AN ORGANIZED HEALTH CARE EDUCATION/TRAINING PROGRAM

## 2025-04-09 ENCOUNTER — CLINICAL SUPPORT (OUTPATIENT)
Dept: CARDIAC REHAB | Facility: HOSPITAL | Age: 75
End: 2025-04-09
Payer: MEDICARE

## 2025-04-09 DIAGNOSIS — Z95.5 S/P PRIMARY ANGIOPLASTY WITH CORONARY STENT: Primary | ICD-10-CM

## 2025-04-09 PROCEDURE — 93798 PHYS/QHP OP CAR RHAB W/ECG: CPT | Performed by: STUDENT IN AN ORGANIZED HEALTH CARE EDUCATION/TRAINING PROGRAM

## 2025-04-11 ENCOUNTER — CLINICAL SUPPORT (OUTPATIENT)
Dept: CARDIAC REHAB | Facility: HOSPITAL | Age: 75
End: 2025-04-11
Payer: MEDICARE

## 2025-04-11 VITALS — SYSTOLIC BLOOD PRESSURE: 100 MMHG | DIASTOLIC BLOOD PRESSURE: 63 MMHG

## 2025-04-11 DIAGNOSIS — Z95.5 S/P PRIMARY ANGIOPLASTY WITH CORONARY STENT: Primary | ICD-10-CM

## 2025-04-11 PROCEDURE — 93798 PHYS/QHP OP CAR RHAB W/ECG: CPT | Performed by: STUDENT IN AN ORGANIZED HEALTH CARE EDUCATION/TRAINING PROGRAM

## 2025-04-14 ENCOUNTER — HOSPITAL ENCOUNTER (OUTPATIENT)
Dept: CARDIOLOGY | Facility: HOSPITAL | Age: 75
Discharge: HOME | End: 2025-04-14
Payer: MEDICARE

## 2025-04-14 ENCOUNTER — CLINICAL SUPPORT (OUTPATIENT)
Dept: CARDIAC REHAB | Facility: HOSPITAL | Age: 75
End: 2025-04-14
Payer: MEDICARE

## 2025-04-14 DIAGNOSIS — I25.10 CORONARY ARTERY DISEASE INVOLVING NATIVE CORONARY ARTERY OF NATIVE HEART, UNSPECIFIED WHETHER ANGINA PRESENT: ICD-10-CM

## 2025-04-14 DIAGNOSIS — I21.4 NSTEMI (NON-ST ELEVATED MYOCARDIAL INFARCTION) (MULTI): ICD-10-CM

## 2025-04-14 DIAGNOSIS — Z95.5 S/P PRIMARY ANGIOPLASTY WITH CORONARY STENT: Primary | ICD-10-CM

## 2025-04-14 PROCEDURE — 93798 PHYS/QHP OP CAR RHAB W/ECG: CPT | Performed by: STUDENT IN AN ORGANIZED HEALTH CARE EDUCATION/TRAINING PROGRAM

## 2025-04-16 ENCOUNTER — CLINICAL SUPPORT (OUTPATIENT)
Dept: CARDIAC REHAB | Facility: HOSPITAL | Age: 75
End: 2025-04-16
Payer: MEDICARE

## 2025-04-16 VITALS — DIASTOLIC BLOOD PRESSURE: 70 MMHG | SYSTOLIC BLOOD PRESSURE: 125 MMHG

## 2025-04-16 DIAGNOSIS — Z95.5 S/P PRIMARY ANGIOPLASTY WITH CORONARY STENT: Primary | ICD-10-CM

## 2025-04-16 PROCEDURE — 93798 PHYS/QHP OP CAR RHAB W/ECG: CPT | Performed by: STUDENT IN AN ORGANIZED HEALTH CARE EDUCATION/TRAINING PROGRAM

## 2025-04-18 ENCOUNTER — CLINICAL SUPPORT (OUTPATIENT)
Dept: CARDIAC REHAB | Facility: HOSPITAL | Age: 75
End: 2025-04-18
Payer: MEDICARE

## 2025-04-18 VITALS — SYSTOLIC BLOOD PRESSURE: 109 MMHG | DIASTOLIC BLOOD PRESSURE: 65 MMHG

## 2025-04-18 DIAGNOSIS — Z95.5 S/P PRIMARY ANGIOPLASTY WITH CORONARY STENT: Primary | ICD-10-CM

## 2025-04-18 PROCEDURE — 93798 PHYS/QHP OP CAR RHAB W/ECG: CPT | Performed by: STUDENT IN AN ORGANIZED HEALTH CARE EDUCATION/TRAINING PROGRAM

## 2025-04-21 ENCOUNTER — CLINICAL SUPPORT (OUTPATIENT)
Dept: CARDIAC REHAB | Facility: HOSPITAL | Age: 75
End: 2025-04-21
Payer: MEDICARE

## 2025-04-21 VITALS — DIASTOLIC BLOOD PRESSURE: 71 MMHG | SYSTOLIC BLOOD PRESSURE: 124 MMHG

## 2025-04-21 DIAGNOSIS — Z95.5 S/P PRIMARY ANGIOPLASTY WITH CORONARY STENT: Primary | ICD-10-CM

## 2025-04-21 PROCEDURE — 93798 PHYS/QHP OP CAR RHAB W/ECG: CPT | Performed by: STUDENT IN AN ORGANIZED HEALTH CARE EDUCATION/TRAINING PROGRAM

## 2025-04-23 ENCOUNTER — CLINICAL SUPPORT (OUTPATIENT)
Dept: CARDIAC REHAB | Facility: HOSPITAL | Age: 75
End: 2025-04-23
Payer: MEDICARE

## 2025-04-23 VITALS — DIASTOLIC BLOOD PRESSURE: 75 MMHG | SYSTOLIC BLOOD PRESSURE: 130 MMHG

## 2025-04-23 DIAGNOSIS — Z95.5 S/P PRIMARY ANGIOPLASTY WITH CORONARY STENT: Primary | ICD-10-CM

## 2025-04-23 PROCEDURE — 93798 PHYS/QHP OP CAR RHAB W/ECG: CPT | Performed by: STUDENT IN AN ORGANIZED HEALTH CARE EDUCATION/TRAINING PROGRAM

## 2025-04-23 NOTE — PROGRESS NOTES
"  Cardiac Rehabilitation 90 Day Reassessment    Name: Gael Burnette  Medical Record Number: [id number]  YOB: 1950  Age: 74 y.o.    Today’s Date: 4/23/2025  Primary Care Physician: Kirill Rivera MD  Referring Physician: Gael Kapoor APRN; Dr. Tyshawn Dixon ([facility])  Program Location: [address]   Exercise Start Date: 2/7/2025  Sessions Completed: 33    General  Primary Diagnosis: Z95.5 -S/P primary angioplasty with coronary stent   Onset/Date of Diagnosis: 1/20/2025    AACVPR Risk Stratification: Moderate    Falls Risk: Medium  Psychosocial Assessment     Pre PHQ-9: 2    Sent PH-Q 9 to MD if score > 20: No; score < 20    Pt reported/currently experiencing stress: No  Patient uses stress management skills: Yes   History of: no history of anxiety or depression  Currently seeing a mental health provider: No  Social Support: Yes, Whom:Wife and Children  Quality of Life Survey: SF-36   SF-36 Pre Post   Physical Component Score 43.29 TBD   Mental Component Score 62.69 TBD     Learning Assessment:  Learning assessment/barriers: None  Preferred learning method: Auditory, Visual, Reading handout, and Writing handout  Barriers: None  Comments: N/A    Stages of Change:Action    Psychosocial Plan    Goal Status: In progress    Psychosocial Goals:   Maintain or decrease the patient's PHQ-9 score by 1 point by decreasing the amount of days the patient has trouble falling asleep or staying asleep, or sleeping too much or feels tired and has little energy. Patient scored in \"none-minimal\" category, score of 2) (in progress).  Learn and utilize stress management skills and apply them to reported stressors while in the program (Met, 4/23/2025).   Question on new or current psychological issues at least every 30 days (in progress).  Improve PCS and MCS scores by at least 3 points by discharge (in progress).   Educate patient on ways to reduce stress and the importance of stress " "management in regards to cardiac health (Met, 4/23/2025).   Provide 1:1 emotional support as needed and facilitate peer support within the context of the other phase 2 patients (In progress).      Psychosocial Interventions/Education:  Encourage patient to complete all emotional health and stress reduction activities and worksheets provided throughout the program. (In progress)  Patient reported stress at 0 out of 10 as of 02/28/2025.  Patient reported stress of 0 out of 10 at this time. 3/26/2025  Assigned Chapter 6: Emotional Health. Also assigned patient to read over handouts, \"Stress Management,\" and \"Stress Less for a Healthier Heart.\" Patient verbalized understanding. 4/23/2025  Patient reported a stress level of 0 out of 10 at this time. 4/23/2025      Nutrition Assessment:    Hyperlipidemia: Yes     Lipids:   Lab Results   Component Value Date    CHOL 149 09/09/2024    HDL 30.0 09/09/2024    TRIG 301 (H) 09/09/2024       Current Dietary Guidelines: Regular diet, no restrictions  Barriers to dietary change: no    Diet Habit Survey: Picture Your Plate  Pre: 48  Post: To be done at discharge.    Diabetes Assessment    Lab Results   Component Value Date    HGBA1C 7.0 (H) 01/07/2025       History of Diabetes: Yes Pt monitors BS at home: Yes   Frequency: 1-2 /day  FBS range: 140 - 170  Hypoglycemic Episodes: No     Weight Management    Pre:   Height: 179.7 cm (5' 10.75\")  Weight: 89.6 kg (197 lb 9.6 oz)  BMI (Calculated): 27.76    Post: To be done at discharge    Waist Measurement:   Pre: 44.5\"   Post: To be done at discharge.      Nutrition Plan    Goal Status: In progress    Nutrition Goals:  Increase PYP score to greater than 60 by discharge. (On admission, patient scored 48 which is in category, \"A dietary pattern that is also unlikely to meet current recommendations for good health and has room for improvement\" (In progress).  Provide education on nutritional aspects related to cardiac health and discuss " "dietary improvements while in the program (Met, 2/24/2025).   Lose 1/2 inch from waist by discharge. (44.5\" On admission) (in progress).   Gain knowledge on lipids and have a better understanding of lab results during the program (Met, 2/26/2025)  Discuss PYP scores within the first 12 sessions of the program (Goal met, 02/28/2025).  Refer to diabetic educator by end of program (Goal met, 3/3/2025).      Nutrition Interventions/Education:   Informed patient that nutritional topics will be discussed including following of the Mediterranean Diet. Patient verbalized understanding. 2/3/2025  Explained to patient that education will be completed with book and workbook and expected for completion. Discussed with patient that there will be several different topics over the 12 weeks. Patient verbalized understanding. 2/3/2025  Reviewed and discussed PYP scores with patient, including providing sheet with tips for improvement. Patient acknowledged understanding, 02/24/2025.  Assigned Chapter 2 Nutrition Quiz.  Patient also assigned to review handouts on the Mediterranean diet including food pyramid, sample diet, and shopping list. Patient also given handouts, \"AHA's \"Heart Healthy Eating on a Budget,\" and \"Building a Heart Healthy Plate.\" Patient acknowledged understanding, 02/24/2025.  Referred to diabetic educator, 3/3/2025.  Assigned Chapter 5: High Cholesterol Quiz. Discussed and provided verbal education on patient's last lipid levels reported. Discussed both HDL and LDL cholesterol as well as goals for total cholesterol and triglycerides. Assigned patient to read handouts, \"AHA's What is Cholesterol?\", \"How to control cholesterol,\" \"Know your Cholesterol Numbers\" and \"How do my cholesterol levels affect my risk of heart attack and stroke?\" Patient verbalized understanding, 3/26/2025.   Assigned Chapter 5: Understanding Diabetes quiz. Assigned patient to read handout,\" Take Diabetes to heart,\" \"Know your numbers,\" and  " "\"ADA's, \"Food for thought.\" Patient verbalized understanding. 3/25/2025    Exercise Assessment    Yes  Mode: Aerobic & Strength Training   Frequency: 2-3 days/week  Duration:  30 minutes    Exercise Prescription     Exercise Prescription based on: 6 Minute Walk Test    6MWT: Yes  Pre Test O2 Sat/HR: 96/71  6 Minute O2 Sat/HR: 97/82  Distance Walked(ft): 1200  Nevin Dyspnea: 1  Nevin PRE: 0  Post Test O2 Sat/HR: 96/75  Adverse Reactions : none     Frequency:  3 days/week   Mode: Treadmill, NuStep, and Recumbent Cycle   Duration: >30 total aerobic minutes   Intensity: RPE <15  Target HR:  Rest+30    Max HR: 118-133  MET/ Level: 5.9  Patient wears supplemental O2: No     Modality Workload METs Duration (minutes)   1 Rest   5:00   2 Warm Up    5 :00   3 Treadmill 2.5 mph @ 4%  4.3 12 :00   4 Recumbent Bike 66 Esparza @ 4 5.9 12 :00   5 NuStep 92 Esparza @ 4 5.1 12 :00   6 weights 7 lb @10 3.3 5:00   7 Cooldown   5:00   8 Rest   5:00     Resistance Training: Yes: patient began lifting 5lb. Weights at session 9, 02/26/2025  - Patient is currently lifting 6 lb weights at this time. Patient tolerating well. 3/26/2025  - Patient is currently lifting 7 lb weights at this time. Patient tolerating well. 4/23/2025    Home Exercise Prescription given: To be given prior to discharge from program.    Exercise Plan    Goal Status: In progress    Exercise Goals:   Increase mets to 5.0 by the end of the program (Goal met, 02/28/2025).  Increase mets to 7.0 by the end of the program (in progress).  Gain independence and confidence with exercise while in the program (In progress,  02/28/2025).  Have a plan to continue exercise after he completes the program (In progress, 02/28/2025).   Increase mets by 1.0 every 6 weeks as tolerated (in progress, 02/28/2025).    Exercise Interventions/Education:   \"What are exactly mets\" handout provided and discussed. Patient verbalized understanding. 2/3/2025  NEVIN handout provided and discussed. Patient " "verbalized understanding. 2/3/2025  Patient reached first met goal of 5.0 by obtaining 5.5 mets on the bicycle. Patient's goal increased to 7.0, 02/28/2025.  Patient is currently working at level 4 on both the bike and the Nustep, walking 2.5 with a 1% incline and lifting 5lb. Weights. Patient is tolerating well, 02/28/2025.  Patient reports he is exercising at home on days when not at rehab, 2 days per week for 60 minutes each day. Patient commended for efforts, 02/28/2025.   Assigned Chapter 3: Exercise More. Patient also to read handouts, \"Aerobic Exercise,\" and \"Real Benefits of Exerise.\" Patient encouraged to begin home exercise program for 20-30 minutes on days when not exercising at cardiac rehab. Patient verbalized understanding, 3/26/2025.    Other Core Components/Risk Factor Assessment:    Medication adherence  Current Medications:   Medication Documentation Review Audit       Reviewed by Sis Mcgee RN (Registered Nurse) on 02/03/25 at 1443      Medication Order Taking? Sig Documenting Provider Last Dose Status   ascorbic acid (Vitamin C) 1,000 mg tablet [id number] No Take 1 tablet (1,000 mg) by mouth once daily. Historical Provider, MD 1/17/2025 Morning Active   aspirin 81 mg EC tablet [id number]  Take 1 tablet (81 mg) by mouth once daily. Historical Provider, MD  Active   atorvastatin (Lipitor) 40 mg tablet [id number] No Take 1 tablet (40 mg) by mouth once daily at bedtime. Rach Francisco, APRN-CNP 1/12/2025 Evening Active   b complex 0.4 mg tablet [id number] No Take 1 tablet by mouth once daily. Elliott Flores MD 1/17/2025 Morning Active   Cinnamon 500 mg capsule [id number] No Take 2 capsules (1,000 mg) by mouth 2 times a day. Elliott Provider, MD 1/17/2025 Morning Active   empagliflozin (Jardiance) 25 mg [id number] No Take 1 tablet (25 mg) by mouth once daily. Elliott Flores MD 1/17/2025 Morning Active   FreeStyle Lancets 28 gauge [id number] No 1 each if needed. Historical Provider, " MD 1/17/2025 Morning Active   FreeStyle Lite Strips strip [id number] No 1 each once daily. Elliott Flores MD 1/17/2025 Morning Active   glipiZIDE (Glucotrol) 10 mg tablet [id number] No Take 1 tablet (10 mg) by mouth 2 times a day before meals. Elliott Flores MD 1/17/2025 Morning Active   glucosamine-chondroitin (Osteo Bi-Flex) 250-200 mg tablet [id number] No Take 1 tablet by mouth once daily in the morning. Elliott Flores MD 1/17/2025 Morning Active   levothyroxine (Synthroid, Levoxyl) 112 mcg tablet [id number] No Take 1 tablet (112 mcg) by mouth early in the morning.. Take on an empty stomach at the same time each day, either 30 to 60 minutes prior to breakfast Elliott Flores MD 1/17/2025 Morning Active   lisinopril 5 mg tablet [id number] No Take 1 tablet (5 mg) by mouth once daily. Elliott Flores MD 1/20/2025 Morning Active   metoprolol succinate XL (Toprol-XL) 25 mg 24 hr tablet [id number] No Take 2 tablets (50 mg) by mouth once daily. Do not crush or chew. Tyshawn Dixon MD 1/20/2025 Morning Active   multivitamin tablet [id number] No Take 1 tablet by mouth once daily. Elliott Flores MD 1/17/2025 Morning Active   omega 3-dha-epa-fish oil (Fish OiL) 1,200 (144-216) mg capsule [id number] No Take 1 capsule (1,200 mg) by mouth once daily. Elliott Flores MD 1/17/2025 Morning Active   omeprazole (PriLOSEC) 40 mg DR capsule [id number] No Take 1 capsule (40 mg) by mouth once daily in the morning. Take before meals. Do not crush or chew. Elliott Flores MD 1/17/2025 Morning Active   SITagliptin phos-metformin (Janumet) 50-1,000 mg tablet [id number] No Take 1 tablet by mouth 2 times daily (morning and late afternoon). Elliott Flores MD 1/17/2025 Morning Active   tamsulosin (Flomax) 0.4 mg 24 hr capsule [id number] No Take 1 capsule (0.4 mg) by mouth once daily. Elliott Provider, MD 1/17/2025 Morning Active   ticagrelor (Brilinta) 90 mg tablet [id number] No Take 1  "tablet (90 mg) by mouth 2 times a day. Historical Provider, MD 2025 Morning Active   triamcinolone (Kenalog) 0.1 % cream [id number] No Apply topically once daily as needed for irritation. Historical Provider, MD Unknown Active                 Medication changes, 2025:  Truicity injection once a week on   Metoporol 50 mg daily  Patient reports he no longer is taking Janumet; but is taking metformin 1000 mg BID     Patient reports no medication changes at this time. 3/26/2025.   Patient reports no medication changes at this time. 2025.                 Medication compliance: Yes   Uses pill box/organizer: Yes    Carries medication list: Yes     Blood Pressure Management  History of Hypertension: Yes   Medication Changes: No   Resting BP:  Visit Vitals  /75        Heart Failure Management  Hx of Heart Failure: No    Smoking/Tobacco Assessment  Social History     Tobacco Use   Smoking Status Former    Current packs/day: 0.00    Average packs/day: 1.5 packs/day for 11.4 years (17.1 ttl pk-yrs)    Types: Cigarettes    Start date: 1969    Quit date: 1981    Years since quittin.8   Smokeless Tobacco Never       Other Core Component Plan    Goal Status: In progress    Other Core Component Goals:   Achieve and maintain a resting BP of <130/80 while in the program (in progress).  BP: 112/73 as of 2025  BP: 112/71 as of 3/26/2025  BP: 130/75 as of 2025  Increase knowledge test score from 14/15 to 15/15 by discharge (in progress).   Monitor blood pressure pre, during and post workout (in progress).   Meet and discuss knowledge test within the first 12 sessions (Goal met, 2025).    Other Core Component Interventions/Education:   Educate the patient on the importance of carrying and updating medication list. (In progress)   Handouts given, \"Benefits of Cardiac Rehab\" and American Heart Association's, \"What is Cardiac Rehab?\" Patient verbalized understanding. " "2/3/2025  Monitor blood pressure pre, during and post workout.   BP as of 2/28/2025: 112/73  BP as of 3/26/2025: 112/71  BP as of 4/23/2025: 130/75  Assigned Chapter 1: Cardiac and Stroke, 02/28/2025.   Discussed knowledge test survey results. Went over missed questions and provided rationale for incorrect questions. Patient verbalized understanding, 02/28/2025.   Chapter 5 High Blood Pressure Quiz. Instructed patient to review handouts, \"Use herbs and spices instead of salt,\" and \"Consequences of high blood pressure.\"  Patient verbalized understanding. 3/26/2025  Chapter 4: Medications was assigned to patient at this time. Patient also received handout \"Tips on taking medications\" Patient verbalized understanding.     Individual Patient Goals:    Improve energy levels.  Develop exercise routine to continue after program.     Goal Status: In progress    Staff Comments:  90 Day Reassessment. Patient has completed 33 sessions of cardiac rehab. Patient continues to work hard and obtain daily goals. Patient shows NSR to sinus tachycardia on monitor with no ectopy noted. Patient interacts well with staff and is encouraging to peers. Patient feels he has improved 75% since beginning cardiac rehab. Patient is given daily goals in which he either meets or exceeds. Patient feels he is more dedicated to exercise and is better at remembering to take all of his medications daily. Patient also reports that he is working on adjusting his diet. Will reassess in 30 days.     Rehab Staff Signature: Sis Mcgee RN        "

## 2025-04-25 ENCOUNTER — CLINICAL SUPPORT (OUTPATIENT)
Dept: CARDIAC REHAB | Facility: HOSPITAL | Age: 75
End: 2025-04-25
Payer: MEDICARE

## 2025-04-25 DIAGNOSIS — Z95.5 S/P PRIMARY ANGIOPLASTY WITH CORONARY STENT: Primary | ICD-10-CM

## 2025-04-25 PROCEDURE — 93798 PHYS/QHP OP CAR RHAB W/ECG: CPT | Performed by: STUDENT IN AN ORGANIZED HEALTH CARE EDUCATION/TRAINING PROGRAM

## 2025-04-28 ENCOUNTER — CLINICAL SUPPORT (OUTPATIENT)
Dept: CARDIAC REHAB | Facility: HOSPITAL | Age: 75
End: 2025-04-28
Payer: MEDICARE

## 2025-04-28 VITALS — SYSTOLIC BLOOD PRESSURE: 102 MMHG | DIASTOLIC BLOOD PRESSURE: 62 MMHG

## 2025-04-28 DIAGNOSIS — Z95.5 S/P PRIMARY ANGIOPLASTY WITH CORONARY STENT: Primary | ICD-10-CM

## 2025-04-28 PROCEDURE — 93798 PHYS/QHP OP CAR RHAB W/ECG: CPT | Performed by: STUDENT IN AN ORGANIZED HEALTH CARE EDUCATION/TRAINING PROGRAM

## 2025-04-30 ENCOUNTER — CLINICAL SUPPORT (OUTPATIENT)
Dept: CARDIAC REHAB | Facility: HOSPITAL | Age: 75
End: 2025-04-30
Payer: MEDICARE

## 2025-04-30 VITALS — SYSTOLIC BLOOD PRESSURE: 116 MMHG | DIASTOLIC BLOOD PRESSURE: 69 MMHG

## 2025-04-30 DIAGNOSIS — Z95.5 S/P PRIMARY ANGIOPLASTY WITH CORONARY STENT: Primary | ICD-10-CM

## 2025-04-30 PROCEDURE — 93798 PHYS/QHP OP CAR RHAB W/ECG: CPT | Performed by: STUDENT IN AN ORGANIZED HEALTH CARE EDUCATION/TRAINING PROGRAM

## 2025-05-02 ENCOUNTER — APPOINTMENT (OUTPATIENT)
Dept: CARDIOLOGY | Facility: CLINIC | Age: 75
End: 2025-05-02
Payer: MEDICARE

## 2025-05-02 VITALS
SYSTOLIC BLOOD PRESSURE: 108 MMHG | OXYGEN SATURATION: 97 % | HEART RATE: 77 BPM | HEIGHT: 71 IN | DIASTOLIC BLOOD PRESSURE: 60 MMHG | WEIGHT: 193 LBS | BODY MASS INDEX: 27.02 KG/M2

## 2025-05-02 DIAGNOSIS — I21.4 NON-ST ELEVATION (NSTEMI) MYOCARDIAL INFARCTION (MULTI): ICD-10-CM

## 2025-05-02 DIAGNOSIS — I10 HYPERTENSION, UNSPECIFIED TYPE: Primary | ICD-10-CM

## 2025-05-02 DIAGNOSIS — E03.9 HYPOTHYROIDISM, UNSPECIFIED TYPE: ICD-10-CM

## 2025-05-02 DIAGNOSIS — Z95.5 HX OF HEART ARTERY STENT: ICD-10-CM

## 2025-05-02 DIAGNOSIS — I21.4 NSTEMI (NON-ST ELEVATED MYOCARDIAL INFARCTION) (MULTI): ICD-10-CM

## 2025-05-02 DIAGNOSIS — I25.10 CORONARY ARTERY DISEASE INVOLVING NATIVE CORONARY ARTERY OF NATIVE HEART, UNSPECIFIED WHETHER ANGINA PRESENT: ICD-10-CM

## 2025-05-02 DIAGNOSIS — I25.10 ATHEROSCLEROSIS OF NATIVE CORONARY ARTERY OF NATIVE HEART WITHOUT ANGINA PECTORIS: ICD-10-CM

## 2025-05-02 DIAGNOSIS — E78.5 HYPERLIPIDEMIA, UNSPECIFIED HYPERLIPIDEMIA TYPE: ICD-10-CM

## 2025-05-02 DIAGNOSIS — E11.9 DIABETES MELLITUS TYPE II, NON INSULIN DEPENDENT (MULTI): ICD-10-CM

## 2025-05-02 PROCEDURE — 1036F TOBACCO NON-USER: CPT | Performed by: STUDENT IN AN ORGANIZED HEALTH CARE EDUCATION/TRAINING PROGRAM

## 2025-05-02 PROCEDURE — 4010F ACE/ARB THERAPY RXD/TAKEN: CPT | Performed by: STUDENT IN AN ORGANIZED HEALTH CARE EDUCATION/TRAINING PROGRAM

## 2025-05-02 PROCEDURE — 3078F DIAST BP <80 MM HG: CPT | Performed by: STUDENT IN AN ORGANIZED HEALTH CARE EDUCATION/TRAINING PROGRAM

## 2025-05-02 PROCEDURE — 93000 ELECTROCARDIOGRAM COMPLETE: CPT | Performed by: STUDENT IN AN ORGANIZED HEALTH CARE EDUCATION/TRAINING PROGRAM

## 2025-05-02 PROCEDURE — 1160F RVW MEDS BY RX/DR IN RCRD: CPT | Performed by: STUDENT IN AN ORGANIZED HEALTH CARE EDUCATION/TRAINING PROGRAM

## 2025-05-02 PROCEDURE — 3008F BODY MASS INDEX DOCD: CPT | Performed by: STUDENT IN AN ORGANIZED HEALTH CARE EDUCATION/TRAINING PROGRAM

## 2025-05-02 PROCEDURE — 1159F MED LIST DOCD IN RCRD: CPT | Performed by: STUDENT IN AN ORGANIZED HEALTH CARE EDUCATION/TRAINING PROGRAM

## 2025-05-02 PROCEDURE — 3074F SYST BP LT 130 MM HG: CPT | Performed by: STUDENT IN AN ORGANIZED HEALTH CARE EDUCATION/TRAINING PROGRAM

## 2025-05-02 PROCEDURE — 99214 OFFICE O/P EST MOD 30 MIN: CPT | Performed by: STUDENT IN AN ORGANIZED HEALTH CARE EDUCATION/TRAINING PROGRAM

## 2025-05-02 NOTE — PROGRESS NOTES
Chief Complaint   Patient presents with    Follow-up     3 mo follow up, [facility] 1/20/25; tte done 1/20/25; EKG done 1/20/25 after procedure     HPI:  I was requested by Dr. Rivera to evaluate this patient in consultation for cardiac assessment.     Mr. Gael Burnette is a 74 y.o. former smoker diabetic male with prior medical history significant for CAD S/P PCI to LAD (01/2025), PCI to LAD and OM (09/2024), HF with recovered LVEF (35 - 40% to 60-65%), hypertension, diabetes mellitus, hyperlipidemia, hypothyroidism. He presented to ED [facility] on 01/17/2025 complaining of chest pain. He explained that for the past weeks he have been experiencing chest pressure similar to the one that he had before the stent. The pain gets worse on exertion, especially lifting weights with his left arm, and improves by resting. He even went to ER before due to this pain. The pain increased significantly on 01/17/2025 and he returned to the hospital. He endorsed a severe chest pain for some minutes that improved with Nitroglycerin. Also had chest pain while walking from the bathroom to the bed. He denies shortness of breath, palpitations, leg edema, lightheadedness, headaches, fever, chills, orthopnea, paroxysmal nocturnal dyspnea or syncope. Notably, he had been missing many times his morning medications (forgot 12 times). EKG showed sinus rhythm with incomplete RBBB and non-specific ST-T changes. Troponin 279 - 310. BNP 54. ACS measurements have been started in the ER. He has been admitted for clinical compensation. Patient diagnosed with NSTEMI, underwent new S/P Successful PCI to LAD.    Patient returns today stating that is asymptomatic from the cardiovascular standpoint and feeling fine. Has been compliant to the medication. Denies chest pain, shortness of breath, palpitations, leg edema, lightheadedness, headaches, fever, chills, orthopnea, paroxysmal nocturnal dyspnea or syncope.      Past Medical History  Medical  "History[1]    Past Surgical History  Surgical History[2]    Past Family History  Family History[3]    Allergy History  Allergies[4]    Past Social History  Social History[5]    Tobacco Use History[6]    Objective Data:  Last Recorded Vitals:  Vitals:    05/02/25 1136   BP: 108/60   Pulse: 77   SpO2: 97%   Weight: 87.5 kg (193 lb)   Height: 1.797 m (5' 10.75\")       Last Labs:  CBC - 1/20/2025:  4:34 AM  7.6 16.7 228    49.6      CMP - 1/20/2025:  4:34 AM  9.2 6.4 30 --- 0.6   _ 4.1 20 62      PTT - 1/17/2025:  1:31 PM  0.9   10.7 30     TROPHS   Date/Time Value Ref Range Status   01/18/2025 12:36 PM 2,000 0 - 20 ng/L Final   01/17/2025 11:54  0 - 20 ng/L Final     Comment:     Previous result verified on 1/17/2025 1128 on specimen/case [id number] called with component [facility] for procedure Troponin I, High Sensitivity, Initial with value 279 ng/L.   01/17/2025 10:52  0 - 20 ng/L Final     BNP   Date/Time Value Ref Range Status   09/09/2024 11:07 AM 54 0 - 99 pg/mL Final     HGBA1C   Date/Time Value Ref Range Status   04/25/2025 10:28 AM 7.4 4.3 - 5.6 % Final     Comment:     American Diabetes Association guidelines indicate that patients with HgbA1c in the range 5.7-6.4% are at increased risk for development of diabetes, and intervention by lifestyle modification may be beneficial. HgbA1c greater or equal to 6.5% is considered diagnostic of diabetes.   01/07/2025 10:21 AM 7.0 4.3 - 5.6 % Final     Comment:     American Diabetes Association guidelines indicate that patients with HgbA1c in the range 5.7-6.4% are at increased risk for development of diabetes, and intervention by lifestyle modification may be beneficial. HgbA1c greater or equal to 6.5% is considered diagnostic of diabetes.     LDLCALC   Date/Time Value Ref Range Status   09/09/2024 02:50 PM 59 <=99 mg/dL Final     Comment:                                 Near   Borderline      AGE      Desirable  Optimal    High     High     Very High     " 0-19 Y     0 - 109     ---    110-129   >/= 130     ----    20-24 Y     0 - 119     ---    120-159   >/= 160     ----      >24 Y     0 -  99   100-129  130-159   160-189     >/=190       VLDL   Date/Time Value Ref Range Status   09/09/2024 02:50 PM 60 0 - 40 mg/dL Final        Patient Medications:  Encounter Medications[7]    Physical Exam:  General: alert, oriented and in no acute distress  HEENT: NC/AT; EOMI; PERRLA, external ear is normal  Neck: supple; trachea midline; no masses; no JVD  Chest: clear breath sounds bilaterally; no wheezing  Cardio: regular rhythm, S1S2 normal, no murmurs  Abdomen: Soft, non-tender, non-distension, no organomegaly  Extremities: no clubbing/cyanosis/edema. Good healing access site  Neuro: Grossly intact     Psychiatric: Normal mood and affect     Past Cardiology Results (Last 3 Years):  EKG:  ECG 12 Lead 01/19/2025      ECG 12 Lead 01/18/2025      ECG 12 lead 01/17/2025      ECG 12 lead 01/17/2025      ECG 12 lead (Clinic Performed) 01/14/2025      ECG 12 lead 01/13/2025      ECG 12 lead 01/13/2025      ECG 12 lead (Ancillary Performed) 01/08/2025      Electrocardiogram 12 Lead 09/16/2024      ECG 12 lead 09/14/2024      ECG 12 Lead 09/10/2024      ECG 12 lead 09/09/2024 (Wet Read)      ECG 12 lead 09/09/2024    Echo:  Echo Results:  Transthoracic Echo (TTE) Complete With Contrast 01/20/2025    [place] [address]  Phone [phone], Fax [phone]    TRANSTHORACIC ECHOCARDIOGRAM REPORT    Patient Name:       TROY Jacques Physician:    [id number] Ar Allen MD  Study Date:         1/20/2025            Ordering Provider:    [id number] SURI ONEILL  MRN/PID:            [id number]             Fellow:  Accession#:         [id number]         Nurse:                Catie Plunkett RN  Date of Birth/Age:  1950 / 74 years  Sonographer:          Tamica Salazar  RVT, RCS  Gender Assigned at                       Additional Staff:  Birth:  Height:             177.80 cm            Admit Date:           1/17/2025  Weight:             88.00 kg             Admission Status:     Inpatient -  Routine  BSA / BMI:          2.06 m2 / 27.84      Department Location:  [address]  kg/m2  Blood Pressure: 94 /61 mmHg    Study Type:    TRANSTHORACIC ECHO (TTE) COMPLETE  Diagnosis/ICD: Non ST elevation (NSTEMI) myocardial infarction-I21.4  Indication:    Acute Coronary Syndrome: Non-STEMI, CP  CPT Codes:     Echo Complete w Full [id number]    Patient History:  Pertinent History: Previous echo 9-10-24.    Study Detail: The following Echo studies were performed: 2D, M-Mode, Doppler and  color flow. Definity used as a contrast agent for endocardial  border definition. Total contrast used for this procedure was 2 mL  via IV push. A bubble study was not performed. The patient was  awake.      PHYSICIAN INTERPRETATION:  Left Ventricle: Left ventricular ejection fraction is moderately decreased, by visual estimate at 35-40%. Multiple wall motion abnormalties exist, see findings. The left ventricular cavity size is normal. There is mild increased septal and mildly increased posterior left ventricular wall thickness. There is left ventricular concentric remodeling. Spectral Doppler shows a Grade I (impaired relaxation pattern) of left ventricular diastolic filling with normal left atrial filling pressure.  LV Wall Scoring:  The entire anterior septum, mid and apical inferior septum, and apex are  hypokinetic. All remaining scored segments are normal.    Left Atrium: The left atrium is normal in size. A bubble study using agitated saline was not performed.  Right Ventricle: The right ventricle is normal in size. There is normal right ventricular global systolic function.  Right Atrium: The right atrium is normal in size.  Aortic Valve: The aortic valve is trileaflet. The aortic valve dimensionless index is 0.81. There is no evidence  of aortic valve regurgitation. The peak instantaneous gradient of the aortic valve is 4 mmHg. The mean gradient of the aortic valve is 2 mmHg.  Mitral Valve: The mitral valve is normal in structure. There is no evidence of mitral valve regurgitation.  Tricuspid Valve: The tricuspid valve is structurally normal. There is mild tricuspid regurgitation.  Pulmonic Valve: The pulmonic valve is not well visualized. There is no indication of pulmonic valve regurgitation.  Pericardium: No pericardial effusion noted.  Aorta: The aortic root is normal.  In comparison to the previous echocardiogram(s): Compared to prior TTE 9/10/2024 - wall motion abnormalities appear to be new. Definity was not used on prior study.      CONCLUSIONS:  1. Left ventricular ejection fraction is moderately decreased, by visual estimate at 35-40%.  2. Entire anterior septum, mid and apical inferior septum, and apex are abnormal.  3. Multiple wall motion abnormalties exist, see findings.  4. Spectral Doppler shows a Grade I (impaired relaxation pattern) of left ventricular diastolic filling with normal left atrial filling pressure.  5. There is normal right ventricular global systolic function.  6. Compared to prior TTE 9/10/2024 - wall motion abnormalities appear to be new. Definity was not used on prior study.    QUANTITATIVE DATA SUMMARY:    2D MEASUREMENTS:           Normal Ranges:  Ao Root d:       2.70 cm   (2.0-3.7cm)  LAs:             2.40 cm   (2.7-4.0cm)  IVSd:            1.35 cm   (0.6-1.1cm)  LVPWd:           1.05 cm   (0.6-1.1cm)  LVIDd:           3.49 cm   (3.9-5.9cm)  LVIDs:           2.57 cm  LV Mass Index:   65.6 g/m2  LV % FS          26.4 %      LA VOLUME:                    Normal Ranges:  LA Vol A4C:        22.1 ml    (22+/-6mL/m2)  LA Vol A2C:        31.5 ml  LA Vol BP:         28.3 ml  LA Vol Index A4C:  10.7ml/m2  LA Vol Index A2C:  15.3 ml/m2  LA Vol Index BP:   13.7 ml/m2  LA Area A4C:       10.4 cm2  LA Area A2C:       13.3  cm2  LA Major Axis A4C: 4.2 cm  LA Major Axis A2C: 4.8 cm  LA Volume Index:   14.9 ml/m2  LA Vol A4C:        21.3 ml  LA Vol A2C:        30.7 ml  LA Vol Index BSA:  12.6 ml/m2      M-MODE MEASUREMENTS:         Normal Ranges:  AoV Exc:             2.20 cm (1.5-2.5cm)      AORTA MEASUREMENTS:         Normal Ranges:  AoV Exc:            2.20 cm (1.5-2.5cm)      LV SYSTOLIC FUNCTION BY 2D PLANIMETRY (MOD):  Normal Ranges:  EF-A4C View:    53 % (>=55%)  EF-A2C View:    55 %  EF-Biplane:     55 %  EF-Visual:      38 %  LV EF Reported: 38 %      LV DIASTOLIC FUNCTION:           Normal Ranges:  MV Peak E:             0.62 m/s  (0.7-1.2 m/s)  MV Peak A:             0.78 m/s  (0.42-0.7 m/s)  E/A Ratio:             0.79      (1.0-2.2)  MV e'                  0.059 m/s (>8.0)  MV lateral e'          0.06 m/s  MV medial e'           0.06 m/s  E/e' Ratio:            10.39     (<8.0)      MITRAL VALVE:          Normal Ranges:  MV DT:        176 msec (150-240msec)      MITRAL INSUFFICIENCY:             Normal Ranges:  MR Vmax:              203.00 cm/s      AORTIC VALVE:                     Normal Ranges:  AoV Vmax:                1.06 m/s (<=1.7m/s)  AoV Peak P.5 mmHg (<20mmHg)  AoV Mean P.0 mmHg (1.7-11.5mmHg)  LVOT Max Daniel:            0.89 m/s (<=1.1m/s)  AoV VTI:                 26.70 cm (18-25cm)  LVOT VTI:                21.60 cm  LVOT Diameter:           2.10 cm  (1.8-2.4cm)  AoV Area, VTI:           2.80 cm2 (2.5-5.5cm2)  AoV Area,Vmax:           2.92 cm2 (2.5-4.5cm2)  AoV Dimensionless Index: 0.81      RIGHT VENTRICLE:  RV Basal 3.06 cm  RV Mid   2.28 cm  RV Major 5.3 cm  TAPSE:   15.2 mm      TRICUSPID VALVE/RVSP:          Normal Ranges:  Peak TR Velocity:     2.44 m/s  RV Syst Pressure:     27 mmHg  (< 30mmHg)      PULMONIC VALVE:          Normal Ranges:  PV Accel Time:  141 msec (>120ms)  PV Max Daniel:     0.9 m/s  (0.6-0.9m/s)  PV Max PG:      3.1 mmHg      [id number] Ar Allen  MD  Electronically signed on 1/20/2025 at 7:50:47 AM      Wall Scoring        ** Final **      Transthoracic Echo (TTE) Complete 09/10/2024    [place] [address]  Phone [phone], Fax [phone]    TRANSTHORACIC ECHOCARDIOGRAM REPORT    Patient Name:      TROY CARRANZA       Reading Physician:    [id number] Ar Allen MD  Study Date:        9/10/2024             Ordering Provider:    [id number] SURI RUIZAN  MRN/PID:           [id number]              Fellow:  Accession#:        [id number]          Nurse:  Date of Birth/Age: 1950 / 74 years   Sonographer:          Octaviano Galvez RDCS  Gender:            M                     Additional Staff:  Height:            180.34 cm             Admit Date:  Weight:            87.09 kg              Admission Status:     Inpatient -  Routine  BSA / BMI:         2.07 m2 / 26.78 kg/m2 Department Location:  [address]  Blood Pressure: 99 /63 mmHg    Study Type:    TRANSTHORACIC ECHO (TTE) COMPLETE  Diagnosis/ICD: Other chest pain-R07.89  CPT Codes:     Echo Complete w Full Doppler-[id number]  Study Detail: The following Echo studies were performed: 2D, M-Mode, color flow  and Doppler.      PHYSICIAN INTERPRETATION:  Left Ventricle: The left ventricular systolic function is normal, with a Basilio's biplane calculated ejection fraction of 60%. There are no regional wall motion abnormalities. The left ventricular cavity size is normal. Spectral Doppler shows a normal pattern of left ventricular diastolic filling.  Left Atrium: The left atrium is normal in size.  Right Ventricle: The right ventricle is normal in size. There is normal right ventricular global systolic function.  Right Atrium: The right atrium is normal in size.  Aortic Valve: The aortic valve is trileaflet. The aortic valve dimensionless index is 0.95. There is no evidence of aortic valve regurgitation. The peak instantaneous gradient of the  aortic valve is 6.5 mmHg. The mean gradient of the aortic valve is 4.0 mmHg.  Mitral Valve: The mitral valve is normal in structure. There is no evidence of mitral valve regurgitation.  Tricuspid Valve: The tricuspid valve is structurally normal. No evidence of tricuspid regurgitation.  Pulmonic Valve: The pulmonic valve is not well visualized. There is no indication of pulmonic valve regurgitation.  Pericardium: There is no pericardial effusion noted.  Aorta: The aortic root is normal.  Systemic Veins: The inferior vena cava appears to be of normal size, IVC inspiratory collapse greater than 50%.      CONCLUSIONS:  1. The left ventricular systolic function is normal, with a Basilio's biplane calculated ejection fraction of 60%.  2. There is normal right ventricular global systolic function.    QUANTITATIVE DATA SUMMARY:    2D MEASUREMENTS:           Normal Ranges:  Ao Root d:       3.20 cm   (2.0-3.7cm)  LAs:             3.20 cm   (2.7-4.0cm)  IVSd:            1.02 cm   (0.6-1.1cm)  LVPWd:           0.84 cm   (0.6-1.1cm)  LVIDd:           4.64 cm   (3.9-5.9cm)  LVIDs:           2.88 cm  LV Mass Index:   70.5 g/m2  LV % FS          37.9 %      LA VOLUME:                   Normal Ranges:  LA Vol A4C:        13.8 ml   (22+/-6mL/m2)  LA Vol A2C:        14.0 ml  LA Vol BP:         14.5 ml  LA Vol Index A4C:  6.7ml/m2  LA Vol Index A2C:  6.8 ml/m2  LA Vol Index BP:   7.0 ml/m2  LA Area A4C:       8.2 cm2  LA Area A2C:       7.9 cm2  LA Major Axis A4C: 4.2 cm  LA Major Axis A2C: 3.8 cm  LA Volume Index:   6.6 ml/m2  LA Vol A4C:        13.6 ml  LA Vol A2C:        14.0 ml  LA Vol Index BSA:  6.7 ml/m2      LV SYSTOLIC FUNCTION BY 2D PLANIMETRY (MOD):  Normal Ranges:  EF-A4C View:    55 % (>=55%)  EF-A2C View:    65 %  EF-Biplane:     60 %  LV EF Reported: 60 %      LV DIASTOLIC FUNCTION:           Normal Ranges:  MV Peak E:             0.56 m/s  (0.7-1.2 m/s)  MV Peak A:             0.74 m/s  (0.42-0.7 m/s)  E/A Ratio:              0.76      (1.0-2.2)  MV e'                  0.108 m/s (>8.0)  MV lateral e'          0.12 m/s  MV medial e'           0.09 m/s  E/e' Ratio:            5.23      (<8.0)      MITRAL VALVE:          Normal Ranges:  MV DT:        243 msec (150-240msec)      AORTIC VALVE:                     Normal Ranges:  AoV Vmax:                1.27 m/s (<=1.7m/s)  AoV Peak P.5 mmHg (<20mmHg)  AoV Mean P.0 mmHg (1.7-11.5mmHg)  LVOT Max Daniel:            1.17 m/s (<=1.1m/s)  AoV VTI:                 25.60 cm (18-25cm)  LVOT VTI:                24.20 cm  LVOT Diameter:           2.00 cm  (1.8-2.4cm)  AoV Area, VTI:           2.97 cm2 (2.5-5.5cm2)  AoV Area,Vmax:           2.89 cm2 (2.5-4.5cm2)  AoV Dimensionless Index: 0.95      RIGHT VENTRICLE:  RV Basal 4.34 cm  RV Mid   3.30 cm  RV Major 7.6 cm  TAPSE:   21.2 mm  RV s'    0.13 m/s      [id number] Ar Allen MD  Electronically signed on 9/10/2024 at 10:36:53 AM        ** Final **     Cath:  Cardiac Catheterization Procedure 2025      Cardiac Catheterization Procedure 2024      Cardiac Catheterization Procedure 09/10/2024    CV NCDR CATHPCI V5 COLLECTION FORM   Stress Test:  No results found for this or any previous visit from the past 1095 days.    Cardiac Imaging:  No results found for this or any previous visit from the past 1095 days.       Assessment/Plan     Mr. Gael Burnette is a 74 y.o. former smoker diabetic male with prior medical history significant for CAD S/P PCI to LAD (2025), PCI to LAD and OM (2024), HF with recovered LVEF (35 - 40% to 60-65%), hypertension, diabetes mellitus, hyperlipidemia, hypothyroidism. He presented to ED [facility] on 2025 complaining of chest pain. He explained that for the past weeks he have been experiencing chest pressure similar to the one that he had before the stent. The pain gets worse on exertion, especially lifting weights with his left arm, and improves by resting. He  even went to ER before due to this pain. The pain increased significantly on 01/17/2025 and he returned to the hospital. He endorsed a severe chest pain for some minutes that improved with Nitroglycerin. Also had chest pain while walking from the bathroom to the bed. He denies shortness of breath, palpitations, leg edema, lightheadedness, headaches, fever, chills, orthopnea, paroxysmal nocturnal dyspnea or syncope. Notably, he had been missing many times his morning medications (forgot 12 times). EKG showed sinus rhythm with incomplete RBBB and non-specific ST-T changes. Troponin 279 - 310. BNP 54. ACS measurements have been started in the ER. He has been admitted for clinical compensation. Patient diagnosed with NSTEMI, underwent new S/P Successful PCI to LAD.     Assessment     # NSTEMI (01/2025) / CAD / S/P PCI to LAD (01/2025) / S/P PCI to LAD / Ramus and 1st OM (09/2024) / HF with recovered LVEF 35-40% --> 60-65%  - Troponin 279 - 310 - 2,000.   - BNP 54.   - Progressively worsening chest pain even while resting.  - EKG showed sinus rhythm with non-specific ST-T changes.   - Left Heart Catheterization (09/10/2024):  Double vessel Coronary Artery Disease  Mid LAD 95% calcified lesion - bifurcation with 1st Dg (70% ostial lesion) and 2nd Dg (50% ostial lesion)   Mid 1st OM 90% calcified lesion  RCA with irregularities  Preserved LV systolic function  S/P Successful PCI to mid LAD (bifurcation with 1st and 2nd Dg) using one XOCHILT, guided by IVUS  - Left Heart Catheterization (09/10/2024):  Patent stent to mid LAD  Mid 1st OM 90% calcified lesion  Prox-mid Ramus 95% diffuse lesion  S/P Successful PCI to prox-mid ramus using one XOCHILT, guided by IVUS  S/P Successful PCI to mid 1st OM (bifurcation lesion) using one XOCHILT, guided by IVUS  - Patient returned with chest pain / NSTEMI on 01/2025.  - Echo (01/2025):   1. Left ventricular ejection fraction is moderately decreased, by visual estimate at 35-40%.   2. Entire  anterior septum, mid and apical inferior septum, and apex are abnormal.   3. Multiple wall motion abnormalties exist, see findings.   4. Spectral Doppler shows a Grade I (impaired relaxation pattern) of left ventricular diastolic filling with normal left atrial filling pressure.   5. There is normal right ventricular global systolic function.   6. Compared to prior TTE 9/10/2024 - wall motion abnormalities appear to be new. Definity was not used on prior study.  - New Left Heart Catheterization (01/20/2025):  Double vessel Coronary Artery Disease  Severe 99% In-stent restenosis lesion to stent in prox-mid LAD  Mid-distal LAD 60% lesion  Patent stent to Mid 1st OM   Patent stent to prox-mid Ramus  Mildly reduced LV systolic function ~40%  S/P Successful PCI to mid-distal LAD using one XOCHILT 2.25/18mm (post-dil 2.5mm NC balloon), guided by IVUS  S/P Successful PCI to restenosis in prox-mid LAD using one XOCHILT 2.75/18mm (post-dil 3.0mm NC balloon), guided by IVUS  - New echo (04/14/2025) showed normal LVEF 60-65% with no wall motion abnormalities. Impaired relaxation pattern of left ventricle diastolic filling (report in [employer]).   - Patient has just graduated from cardiac rehab in [place]. [facility]  - Keep ASA, Ticagrelor, high intensity statin, beta-blocker.  - Follow up in 6 months.      # Hypertension  - Controlled blood pressure.  - Keep current medications including lisinopril 5mg daily.  - Patient counseled to keep a healthy lifestyle including regular exercise and low-sodium diet.  - Recommended home blood pressure monitoring.  - Goal of BP < 130/80mmHg.     # Diabetes  - Counseled on healthy diet and regular exercises.  - Discussed need for weight loss and the benefits.   - Keep current medications including empaglifozin, glipizide.     # Hyperlipidemia  - Keep home medication with Atorvastatin 40mg daily.  - Counseled on healthy diet and regular exercise.      #  Hypothyroidism  - Keep Levothyroxine 112mcg daily      We have discussed the most common side effects of the prescribed medications, indications, drug interactions, risks, complications, and alternatives of medications/therapeutics were explained and discussed. The patient has been requested to monitor closely for any untoward side effects or complications of medications. The patient has been strongly advised to be compliant with the recommendations, all the questions and concerns have been addressed. The patient has been also instructed to call, to return sooner or to go to the emergency department if symptoms persist or get worsen. The patient voiced understanding and denies any further questions at this time.      This note was transcribed using the Dragon Dictation system. There may be grammatical, punctuation, or verbiage errors that occur with voice recognition programs.     Counseling greater than 50% of visit regarding all cardiac issues.     Thank you Dr. Rivera, for allowing me to participate in the care of this patient. Please do not hesitate to contact me with any further questions or concerns.     Tyshawn Dixon MD  Cardiology       [1]   Past Medical History:  Diagnosis Date    Coronary artery disease 09/2024    Diabetes mellitus (Multi) 2003    Disease of thyroid gland     Hyperlipidemia 2007    Hypertension 2007   [2]   Past Surgical History:  Procedure Laterality Date    CARDIAC CATHETERIZATION N/A 9/10/2024    Procedure: Left Heart Cath, With LV;  Surgeon: Tyshawn Dixon MD;  Location: [facility] Cardiac Cath Lab;  Service: Cardiovascular;  Laterality: N/A;    CARDIAC CATHETERIZATION N/A 9/10/2024    Procedure: PCI XOCHILT Stent- Coronary;  Surgeon: Tyshawn Dixon MD;  Location: [facility] Cardiac Cath Lab;  Service: Cardiovascular;  Laterality: N/A;    CARDIAC CATHETERIZATION N/A 9/10/2024    Procedure: IVUS - Coronary;  Surgeon: Tyshawn Dixon MD;  Location: [facility]  Cardiac Cath Lab;  Service: Cardiovascular;  Laterality: N/A;    CARDIAC CATHETERIZATION N/A 2024    Procedure: PCI XOCHILT Stent- Coronary;  Surgeon: Tyshawn Dixon MD;  Location: [facility] Cardiac Cath Lab;  Service: Cardiovascular;  Laterality: N/A;    CARDIAC CATHETERIZATION N/A 2025    Procedure: Left Heart Cath, With LV;  Surgeon: Tyshawn Dixon MD;  Location: [facility] Cardiac Cath Lab;  Service: Cardiovascular;  Laterality: N/A;    CARDIAC CATHETERIZATION N/A 2025    Procedure: IVUS - Coronary;  Surgeon: Tyshawn Dixon MD;  Location: [facility] Cardiac Cath Lab;  Service: Cardiovascular;  Laterality: N/A;    CARDIAC CATHETERIZATION N/A 2025    Procedure: PCI XOCHILT Stent- Coronary;  Surgeon: Tyshawn Dixon MD;  Location: [facility] Cardiac Cath Lab;  Service: Cardiovascular;  Laterality: N/A;   [3]   Family History  Problem Relation Name Age of Onset    Heart disease Father Selvin MAGALLON Yomaira     Heart attack Brother Yosef STALLWORTH Yomaira     Heart disease Other brother    [4] No Known Allergies  [5]   Social History  Socioeconomic History    Marital status:    Tobacco Use    Smoking status: Former     Current packs/day: 0.00     Average packs/day: 1.5 packs/day for 11.4 years (17.1 ttl pk-yrs)     Types: Cigarettes     Start date: 1969     Quit date: 1981     Years since quittin.9    Smokeless tobacco: Never   Substance and Sexual Activity    Alcohol use: Never    Drug use: Never    Sexual activity: Not Currently     Partners: Female     Birth control/protection: None     Social Drivers of Health     Financial Resource Strain: Low Risk  (2025)    Overall Financial Resource Strain (CARDIA)     Difficulty of Paying Living Expenses: Not hard at all   Food Insecurity: No Food Insecurity (2025)    Hunger Vital Sign     Worried About Running Out of Food in the Last Year: Never true     Ran Out of Food in the Last Year: Never true   Transportation Needs: No  Transportation Needs (2025)    PRAPARE - Transportation     Lack of Transportation (Medical): No     Lack of Transportation (Non-Medical): No   Physical Activity: Sufficiently Active (2024)    Received from [facility]    Exercise Vital Sign     Days of Exercise per Week: 3 days     Minutes of Exercise per Session: 60 min   Stress: No Stress Concern Present (2024)    Received from [facility]    [place] [employer] - Occupational Stress Questionnaire     Feeling of Stress : Not at all   Social Connections: Socially Integrated (2024)    Received from [facility]    Social Connection and Isolation Panel [NHANES]     Frequency of Communication with Friends and Family: Once a week     Frequency of Social Gatherings with Friends and Family: Twice a week     Attends Hindu Services: More than 4 times per year     Active Member of Clubs or Organizations: Yes     Attends Club or Organization Meetings: More than 4 times per year     Marital Status:    Intimate Partner Violence: Not At Risk (2025)    Humiliation, Afraid, Rape, and Kick questionnaire     Fear of Current or Ex-Partner: No     Emotionally Abused: No     Physically Abused: No     Sexually Abused: No   Housing Stability: Low Risk  (2025)    Housing Stability Vital Sign     Unable to Pay for Housing in the Last Year: No     Number of Times Moved in the Last Year: 0     Homeless in the Last Year: No   [6]   Social History  Tobacco Use   Smoking Status Former    Current packs/day: 0.00    Average packs/day: 1.5 packs/day for 11.4 years (17.1 ttl pk-yrs)    Types: Cigarettes    Start date: 1969    Quit date: 1981    Years since quittin.9   Smokeless Tobacco Never   [7]   Outpatient Encounter Medications as of 2025   Medication Sig Dispense Refill    ascorbic acid (Vitamin C) 1,000 mg tablet Take 1 tablet (1,000 mg) by mouth once daily.      aspirin 81 mg EC tablet Take 1  tablet (81 mg) by mouth once daily.      atorvastatin (Lipitor) 40 mg tablet Take 1 tablet (40 mg) by mouth once daily at bedtime. 30 tablet 0    b complex 0.4 mg tablet Take 1 tablet by mouth once daily.      Cinnamon 500 mg capsule Take 2 capsules (1,000 mg) by mouth 2 times a day.      empagliflozin (Jardiance) 25 mg Take 1 tablet (25 mg) by mouth once daily.      FreeStyle Lancets 28 gauge 1 each if needed.      FreeStyle Lite Strips strip 1 each once daily.      glipiZIDE (Glucotrol) 10 mg tablet Take 1 tablet (10 mg) by mouth 2 times a day before meals.      glucosamine-chondroitin (Osteo Bi-Flex) 250-200 mg tablet Take 1 tablet by mouth once daily in the morning.      levothyroxine (Synthroid, Levoxyl) 112 mcg tablet Take 1 tablet (112 mcg) by mouth early in the morning.. Take on an empty stomach at the same time each day, either 30 to 60 minutes prior to breakfast      lisinopril 5 mg tablet Take 1 tablet (5 mg) by mouth once daily.      multivitamin tablet Take 1 tablet by mouth once daily.      omega 3-dha-epa-fish oil (Fish OiL) 1,200 (144-216) mg capsule Take 1 capsule (1,200 mg) by mouth once daily.      omeprazole (PriLOSEC) 40 mg DR capsule Take 1 capsule (40 mg) by mouth once daily in the morning. Take before meals. Do not crush or chew.      SITagliptin phos-metformin (Janumet) 50-1,000 mg tablet Take 1 tablet by mouth 2 times daily (morning and late afternoon).      tamsulosin (Flomax) 0.4 mg 24 hr capsule Take 1 capsule (0.4 mg) by mouth once daily.      ticagrelor (Brilinta) 90 mg tablet Take 1 tablet (90 mg) by mouth 2 times a day.      triamcinolone (Kenalog) 0.1 % cream Apply topically once daily as needed for irritation.      metoprolol succinate XL (Toprol-XL) 25 mg 24 hr tablet Take 2 tablets (50 mg) by mouth once daily. Do not crush or chew. 60 tablet 0     No facility-administered encounter medications on file as of 5/2/2025.

## 2025-05-02 NOTE — PROGRESS NOTES
2025  Gael Norris  : 1950    [facility] Diabetic Overview    Discussed in length with patient about their control of their Diabetes. States [facility]  [facility] he sees him every 3-4 months. He has been a diabetic for 22 years. He checks his blood sugars once a day when he wakes up. He is prescribed Trulicity 1x week, and Metformin 1000mg 2x Day. He takes his medications as prescribed. His last A1C was 7.0 on 2025. We discussed all HO provided to patient “A1c Chart on: How does A1c Relate to Blood Sugars”, “A1c Conversion Table”, “Type 2 Diabetes Overview”, “A quick Guide to Insulin”, “Diabetes and Heart Disease Number you need to know”, “Diabetes Care 10 Ways to avoid Complications”, “Big Picture: Checking Your Blood Glucose”, and “Eat Well and Move. All his questions were answered and he has no concerns at this time.    Tricia Amador CV, RRT, RCP

## 2025-05-06 LAB
AORTIC VALVE MEAN GRADIENT: 4 MMHG
AORTIC VALVE PEAK VELOCITY: 1.46 M/S
AV PEAK GRADIENT: 9 MMHG
AVA (PEAK VEL): 2.49 CM2
AVA (VTI): 3.06 CM2
BODY SURFACE AREA: 2.09 M2
EJECTION FRACTION APICAL 4 CHAMBER: 62
EJECTION FRACTION: 63 %
LEFT ATRIUM VOLUME AREA LENGTH INDEX BSA: 10.4 ML/M2
LEFT VENTRICLE INTERNAL DIMENSION DIASTOLE: 4.23 CM (ref 3.5–6)
LEFT VENTRICULAR OUTFLOW TRACT DIAMETER: 1.9 CM
LV EJECTION FRACTION BIPLANE: 66 %
MITRAL VALVE E/A RATIO: 0.9
TRICUSPID ANNULAR PLANE SYSTOLIC EXCURSION: 2.1 CM

## 2025-05-14 ENCOUNTER — TELEPHONE (OUTPATIENT)
Dept: CARDIOLOGY | Facility: CLINIC | Age: 75
End: 2025-05-14
Payer: MEDICARE

## 2025-05-14 NOTE — TELEPHONE ENCOUNTER
----- Message from Tyshawn Dixon sent at 5/12/2025  4:31 PM EDT -----  Please let the patient know that I have reviewed this test and the result was normal. The patient should keep current medication and normal activities at this point.   ----- Message -----  From: Perez, Syngo - Cardiology Results In  Sent: 5/6/2025   2:08 PM EDT  To: Tyshawn Dixon MD

## 2025-11-06 ENCOUNTER — APPOINTMENT (OUTPATIENT)
Dept: CARDIOLOGY | Facility: CLINIC | Age: 75
End: 2025-11-06
Payer: MEDICARE

## (undated) DEVICE — GUIDEWIRE, UNIVERSAL BALANCE MID WEIGHT, 190CM, STR

## (undated) DEVICE — VALVE, HEMOSTASIS, GUARDIAN II NC, W/ GUIDEWIRE INSERTION TOOL

## (undated) DEVICE — CATHETER, GUIDING, VISTA BRITE 6FR, XB 3.5 100CM

## (undated) DEVICE — DEVICE KIT, INFLATION, CUSTOM, PARMA

## (undated) DEVICE — TR BAND, RADIAL COMPRESSION, STANDARD, 24CM

## (undated) DEVICE — CATHETER, BALLOON, NC EUPHORA NONCOMPLIANT 2.5 X 15 X 142CM

## (undated) DEVICE — SHEATH, GLIDESHEATH, SLENDER, 6FR 10CM

## (undated) DEVICE — CATHETER, OPTICROSS 6HD, 3.6F X 135CM

## (undated) DEVICE — CATHETER, GUIDING, LAUNCHER, 6FR EBU 3.5

## (undated) DEVICE — CATHETER, VISTA BRITE TIP GUIDE, 6FR 0.070 XB 3.5 ECO

## (undated) DEVICE — CATHETER, IVUS, OPTICROSS HD IMAGING, 5FR

## (undated) DEVICE — CATHETER, BALLOON, NC EUPHORA NONCOMPLIANT 2.75 X 15 X 142CM

## (undated) DEVICE — ELECTRODE, QUICK-PACE, EDGE RTS, COMBO

## (undated) DEVICE — CATHETER, OPTITORQUE, 5FR, TIG, 1H/100CM

## (undated) DEVICE — CATHETER, BALLOON DILATION, EUPHORA SEMICOMPLIANT 2.50  X 15 MM X 142CM

## (undated) DEVICE — CATHETER, BALLOON, NC EUPHORA NONCOMPLIANT 3.0 X 15 X 142CM

## (undated) DEVICE — GUIDEWIRE, INQWIRE, 3MM J, .035 X 210CM, FIXED

## (undated) DEVICE — CATHETER, BALLOON DILATION, EUPHORA SEMICOMPLIANT 2.0  X 15 MM X 142CM

## (undated) DEVICE — PACK, ANGIO FEMORAL, CUSTOM, SMC

## (undated) DEVICE — KIT, NAMIC STANDARD, LEFT HEART, W/ INTEGRATED COMP MANIFOLD

## (undated) DEVICE — Device

## (undated) DEVICE — CATHETER, BALLOON DILATION, EUPHORA SEMICOMPLIANT 2.0  X 12 MM X 142CM

## (undated) DEVICE — BAG, PERMANENT SLED , FOR IVUS CATHETER